# Patient Record
Sex: FEMALE | Race: WHITE | NOT HISPANIC OR LATINO | Employment: UNEMPLOYED | URBAN - METROPOLITAN AREA
[De-identification: names, ages, dates, MRNs, and addresses within clinical notes are randomized per-mention and may not be internally consistent; named-entity substitution may affect disease eponyms.]

---

## 2017-02-15 ENCOUNTER — NURSE TRIAGE (OUTPATIENT)
Dept: ADMINISTRATIVE | Facility: CLINIC | Age: 22
End: 2017-02-15

## 2017-02-16 ENCOUNTER — TELEPHONE (OUTPATIENT)
Dept: FAMILY MEDICINE | Facility: CLINIC | Age: 22
End: 2017-02-16

## 2017-02-16 NOTE — TELEPHONE ENCOUNTER
Pt went to dermatologist this morning and informed her that what she has is not pinkeye. Pt states she has a ring around eye that is itchy, dry skin that appeared a few days ago. Pt states she is trying desonide cream. Pt stated she does not want an appt right now, but will call us by Wednesday if it is still there.

## 2017-02-16 NOTE — TELEPHONE ENCOUNTER
Appt was canceled today. Attempted to call pt to see how her eye is doing today. LM to return call.

## 2017-02-16 NOTE — TELEPHONE ENCOUNTER
Received message from Evon Francis RN:  Reason for Disposition   Mild eye allergy (all triage questions negative)    Protocols used:  EYE - ALLERGY-A-AH  pt states she is not sure if she has pink eye or not/ c/o itching above and below 1 eye only/ skin very tender now/ denies pinkness to sclera/ + tearing/ denies visual changes/   Has appt w/ pcp tomorrow     Advised to keep appt--  Interim care p/protocol     Evon Francis RN

## 2017-03-06 RX ORDER — CITALOPRAM 10 MG/1
TABLET ORAL
Qty: 30 TABLET | Refills: 6 | Status: SHIPPED | OUTPATIENT
Start: 2017-03-06 | End: 2017-04-25 | Stop reason: ALTCHOICE

## 2017-04-25 ENCOUNTER — OFFICE VISIT (OUTPATIENT)
Dept: FAMILY MEDICINE | Facility: CLINIC | Age: 22
End: 2017-04-25
Payer: COMMERCIAL

## 2017-04-25 VITALS
WEIGHT: 186.31 LBS | SYSTOLIC BLOOD PRESSURE: 106 MMHG | HEIGHT: 63 IN | RESPIRATION RATE: 18 BRPM | DIASTOLIC BLOOD PRESSURE: 72 MMHG | HEART RATE: 100 BPM | TEMPERATURE: 99 F | BODY MASS INDEX: 33.01 KG/M2

## 2017-04-25 DIAGNOSIS — F41.9 ANXIETY: Primary | ICD-10-CM

## 2017-04-25 DIAGNOSIS — R63.5 WEIGHT GAIN: ICD-10-CM

## 2017-04-25 DIAGNOSIS — K21.9 GASTROESOPHAGEAL REFLUX DISEASE, ESOPHAGITIS PRESENCE NOT SPECIFIED: ICD-10-CM

## 2017-04-25 DIAGNOSIS — Z00.00 LABORATORY EXAM ORDERED AS PART OF ROUTINE GENERAL MEDICAL EXAMINATION: ICD-10-CM

## 2017-04-25 PROCEDURE — 99213 OFFICE O/P EST LOW 20 MIN: CPT | Mod: S$GLB,,, | Performed by: NURSE PRACTITIONER

## 2017-04-25 PROCEDURE — 1160F RVW MEDS BY RX/DR IN RCRD: CPT | Mod: S$GLB,,, | Performed by: NURSE PRACTITIONER

## 2017-04-25 RX ORDER — PANTOPRAZOLE SODIUM 40 MG/1
40 TABLET, DELAYED RELEASE ORAL DAILY
Refills: 3 | COMMUNITY
Start: 2017-03-06 | End: 2017-12-13

## 2017-04-25 NOTE — MR AVS SNAPSHOT
[facility]  [address] [address] [address]  [facility] [address]  Phone: [phone]  Fax: [phone]                  Maureen Barrera   2017 8:40 AM   Office Visit    Description:  Female : 1995   Provider:  Bree Stovall NP   Department:  [facility]           Diagnoses this Visit        Comments    Laboratory exam ordered as part of routine general medical examination    -  Primary     Weight gain                To Do List           Future Appointments        Provider Department Dept Phone    2017 8:15 AM LAB, [place] [facility] [phone]      Goals (5 Years of Data)     None      [facility]     [facility] Nurse Care Line -  Assistance  Unless otherwise directed by your provider, please contact [facility], our nurse care line that is available for  assistance.     Registered nurses in the [facility] Center provide: appointment scheduling, clinical advisement, health education, and other advisory services.  Call: [phone] (toll free)               Medications           Message regarding Medications     Verify the changes and/or additions to your medication regime listed below are the same as discussed with your clinician today.  If any of these changes or additions are incorrect, please notify your healthcare provider.        STOP taking these medications     esomeprazole (NEXIUM) 40 MG capsule Take 1 capsule (40 mg total) by mouth before breakfast.    montelukast (SINGULAIR) 10 mg tablet Take 1 tablet (10 mg total) by mouth every evening.    citalopram (CELEXA) 10 MG tablet TAKE 1 TABLET BY MOUTH DAILY           Verify that the below list of medications is an accurate representation of the medications you are currently taking.  If none reported, the list may be blank. If incorrect, please contact your healthcare provider. Carry this list with you in case of emergency.          "  Current Medications     mometasone (NASONEX) 50 mcg/actuation nasal spray 2 sprays by Nasal route once daily.    pantoprazole (PROTONIX) 40 MG tablet Take 40 mg by mouth once daily.    desonide (DESOWEN) 0.05 % cream Apply topically 2 (two) times daily.           Clinical Reference Information           Your Vitals Were     BP Pulse Temp Resp Height Weight    106/72 100 98.5 °F (36.9 °C) (Oral) 18 5' 3" (1.6 m) 84.5 kg (186 lb 4.6 oz)    Last Period BMI             03/28/2017 (Within Days) 33 kg/m2         Blood Pressure          Most Recent Value    BP  106/72      Allergies as of 4/25/2017     No Known Drug Allergies      Immunizations Administered on Date of Encounter - 4/25/2017     None      Orders Placed During Today's Visit     Future Labs/Procedures Expected by Expires    CBC auto differential  4/25/2017 4/26/2018    Comprehensive metabolic panel  4/25/2017 4/26/2018    Hemoglobin A1c  4/25/2017 4/26/2018    Lipid panel  4/25/2017 4/26/2018    TSH  4/25/2017 4/26/2018      Language Assistance Services     ATTENTION: Language assistance services are available, free of charge. Please call [phone].      ATENCIÓN: Si habla sharmin, tiene a ashley disposición servicios gratuitos de asistencia lingüística. Llame al [phone].     CHÚ Ý: N?u b?n nói Ti?ng Vi?t, có các d?ch v? h? tr? ngôn ng? mi?n phí dành cho b?n. G?i s? [phone].         [facility] complies with applicable Federal civil rights laws and does not discriminate on the basis of race, color, national origin, age, disability, or sex.        "

## 2017-04-25 NOTE — PROGRESS NOTES
Subjective:       Patient ID: Maureen Barrera is a 21 y.o. female.    Chief Complaint: No chief complaint on file.    HPI Here for medication follow up. She states she is not taking the Celexa for her anxiety anymore. She has found a new job that she is really comfortable in and feels that her stress level is very low. She is doing well in school and this is her last year so she can see the end date coming. She is back to exercising after work and she is eating healthier. States that she gained a lot of weight and is trying to get some off. She has a strong family history of DM and thyroid disease. She has not had labs done since 2014. She finds that the Protonix does not help control her acid reflux. She is trying to control it with diet. She is now working more normal hours and not working nights so is not skipping meals. She is backing down on caffeine and acid type foods. Will see if this helps. She has no other concerns today. See ROS.    The following portion of the patients history was reviewed and updated as appropriate: allergies, current medications, past medical and surgical history. Past social history and problem list reviewed. Family PMH and Past social history reviewed. Tobacco, Illicit drug use reviewed.     Review of Systems  Constitutional: No fatigue or fever    HENT: no sore throat or nasal congestion. No voice changes    Eyes: No vision changes, blurred vision  Skin: no rashes or lesions  Respiratory:   No SOB, Wheezing, cough  Cardiovascular:   No CP, Palpitations  Gastrointestinal:   No N/V/D. No abdominal pain, weight stable. Appetite good. See HPI  Genitourinary:   No dysuria, urgency or frequency. No change in bowels. No blood in stools.   Musculoskeletal:   No joint pain  No change in gait or coordination. .  Neurological:   No dizziness. No headaches  Hematological: No abnormal bruising or bleeding    Psychiatric/Behavioral Negative for suicidal ideas.  Denies feelings of depression.  "No thoughts of wanting to harm self or others. See HPI    Objective:     /72  Pulse 100  Temp 98.5 °F (36.9 °C) (Oral)   Resp 18  Ht 5' 3" (1.6 m)  Wt 84.5 kg (186 lb 4.6 oz)  LMP 03/28/2017 (Within Days)  BMI 33 kg/m2     Physical Exam     Constitutional: oriented to person, place, and time. well-developed and well-nourished.   Head: Normocephalic.   Eyes: Conjunctivae are normal. Pupils are equal, round, and reactive to light.   Neck: Normal range of motion. Neck supple. No tracheal deviation present. No thyromegaly present.   Cardiovascular: Normal rate, regular rhythm and normal heart sounds.    Pulmonary/Chest: Effort normal and breath sounds normal. No respiratory distress. No wheezes.   Abdominal: Soft. Bowel sounds are normal. No distension. There is no tenderness.   Musculoskeletal: Normal range of motion. Gait and coordination normal   Neurological: oriented to person, place, and time.   Skin: Skin is warm and dry. No rashes or lesions  Psychiatric: Normal mood and affect.Behavior is normal. Judgment and thought content normal.   Assessment:       1. Anxiety    2. Weight gain    3. Gastroesophageal reflux disease, esophagitis presence not specified    4. Laboratory exam ordered as part of routine general medical examination        Plan:         Diagnoses and all orders for this visit:    Anxiety: she is not taking the celexa at this time. Will see how she does without it.     Weight gain: check labs.  -     Hemoglobin A1c; Future  -     TSH; Future    Gastroesophageal reflux disease, esophagitis presence not specified: continue protonix and lifestyle change. If not helping will try the nexium again which worked before.    Laboratory exam ordered as part of routine general medical examination  -     Lipid panel; Future  -     CBC auto differential; Future  -     Comprehensive metabolic panel; Future  -     Hemoglobin A1c; Future  -     TSH; Future    Continue current medication  Take " medications only as prescribed  Healthy diet, exercise  Adequate rest  Adequate hydration  Avoid allergens  Avoid excessive caffeine

## 2017-04-29 ENCOUNTER — LAB VISIT (OUTPATIENT)
Dept: LAB | Facility: HOSPITAL | Age: 22
End: 2017-04-29
Attending: NURSE PRACTITIONER
Payer: COMMERCIAL

## 2017-04-29 DIAGNOSIS — Z00.00 LABORATORY EXAM ORDERED AS PART OF ROUTINE GENERAL MEDICAL EXAMINATION: ICD-10-CM

## 2017-04-29 DIAGNOSIS — R63.5 WEIGHT GAIN: ICD-10-CM

## 2017-04-29 LAB
ALBUMIN SERPL BCP-MCNC: 4 G/DL
ALP SERPL-CCNC: 70 U/L
ALT SERPL W/O P-5'-P-CCNC: 26 U/L
ANION GAP SERPL CALC-SCNC: 10 MMOL/L
AST SERPL-CCNC: 20 U/L
BASOPHILS # BLD AUTO: 0.04 K/UL
BASOPHILS NFR BLD: 0.5 %
BILIRUB SERPL-MCNC: 0.3 MG/DL
BUN SERPL-MCNC: 12 MG/DL
CALCIUM SERPL-MCNC: 9.6 MG/DL
CHLORIDE SERPL-SCNC: 106 MMOL/L
CHOLEST/HDLC SERPL: 3.6 {RATIO}
CO2 SERPL-SCNC: 22 MMOL/L
CREAT SERPL-MCNC: 0.8 MG/DL
DIFFERENTIAL METHOD: ABNORMAL
EOSINOPHIL # BLD AUTO: 0.2 K/UL
EOSINOPHIL NFR BLD: 2.4 %
ERYTHROCYTE [DISTWIDTH] IN BLOOD BY AUTOMATED COUNT: 15 %
EST. GFR  (AFRICAN AMERICAN): >60 ML/MIN/1.73 M^2
EST. GFR  (NON AFRICAN AMERICAN): >60 ML/MIN/1.73 M^2
GLUCOSE SERPL-MCNC: 89 MG/DL
HCT VFR BLD AUTO: 37.2 %
HDL/CHOLESTEROL RATIO: 27.9 %
HDLC SERPL-MCNC: 129 MG/DL
HDLC SERPL-MCNC: 36 MG/DL
HGB BLD-MCNC: 12 G/DL
LDLC SERPL CALC-MCNC: 77.4 MG/DL
LYMPHOCYTES # BLD AUTO: 2.6 K/UL
LYMPHOCYTES NFR BLD: 32 %
MCH RBC QN AUTO: 25 PG
MCHC RBC AUTO-ENTMCNC: 32.3 %
MCV RBC AUTO: 78 FL
MONOCYTES # BLD AUTO: 0.4 K/UL
MONOCYTES NFR BLD: 5.4 %
NEUTROPHILS # BLD AUTO: 4.7 K/UL
NEUTROPHILS NFR BLD: 59.4 %
NONHDLC SERPL-MCNC: 93 MG/DL
PLATELET # BLD AUTO: 340 K/UL
PMV BLD AUTO: 9.9 FL
POTASSIUM SERPL-SCNC: 4.1 MMOL/L
PROT SERPL-MCNC: 7.9 G/DL
RBC # BLD AUTO: 4.8 M/UL
SODIUM SERPL-SCNC: 138 MMOL/L
TRIGL SERPL-MCNC: 78 MG/DL
TSH SERPL DL<=0.005 MIU/L-ACNC: 1.7 UIU/ML
WBC # BLD AUTO: 7.96 K/UL

## 2017-04-29 PROCEDURE — 36415 COLL VENOUS BLD VENIPUNCTURE: CPT | Mod: PO

## 2017-04-29 PROCEDURE — 85025 COMPLETE CBC W/AUTO DIFF WBC: CPT

## 2017-04-29 PROCEDURE — 83036 HEMOGLOBIN GLYCOSYLATED A1C: CPT

## 2017-04-29 PROCEDURE — 84443 ASSAY THYROID STIM HORMONE: CPT

## 2017-04-29 PROCEDURE — 80061 LIPID PANEL: CPT

## 2017-04-29 PROCEDURE — 80053 COMPREHEN METABOLIC PANEL: CPT

## 2017-04-30 LAB
ESTIMATED AVG GLUCOSE: 111 MG/DL
HBA1C MFR BLD HPLC: 5.5 %

## 2017-05-01 ENCOUNTER — PATIENT MESSAGE (OUTPATIENT)
Dept: FAMILY MEDICINE | Facility: CLINIC | Age: 22
End: 2017-05-01

## 2017-05-16 ENCOUNTER — PATIENT MESSAGE (OUTPATIENT)
Dept: FAMILY MEDICINE | Facility: CLINIC | Age: 22
End: 2017-05-16

## 2017-05-17 RX ORDER — MOMETASONE FUROATE 50 UG/1
SPRAY, METERED NASAL
Qty: 17 G | Refills: 4 | Status: SHIPPED | OUTPATIENT
Start: 2017-05-17 | End: 2017-12-11 | Stop reason: ALTCHOICE

## 2017-05-17 RX ORDER — MONTELUKAST SODIUM 10 MG/1
10 TABLET ORAL NIGHTLY
Qty: 30 TABLET | Refills: 6 | Status: SHIPPED | OUTPATIENT
Start: 2017-05-17 | End: 2017-06-16

## 2017-06-14 ENCOUNTER — TELEPHONE (OUTPATIENT)
Dept: FAMILY MEDICINE | Facility: CLINIC | Age: 22
End: 2017-06-14

## 2017-06-14 NOTE — TELEPHONE ENCOUNTER
----- Message from Vivian Jeffery sent at 6/12/2017 11:33 AM CDT -----  Contact: Patient  Patient called requesting a script for ring worms. Send to [facility] in [place], [place] patient has additional questions regarding the ring worms, Please call back at [phone] to advise. Thanks,

## 2017-06-16 ENCOUNTER — TELEPHONE (OUTPATIENT)
Dept: FAMILY MEDICINE | Facility: CLINIC | Age: 22
End: 2017-06-16

## 2017-06-16 NOTE — TELEPHONE ENCOUNTER
Spoke with pt - states Monday AM she called to speak with a nurse regarding a ringworm that she had and didn't receive a callback until Thursday. Upon reviewing chart, pt called Monday at 1130 and call was returned next business day, Tuesday and again on Wednesday. I do not see where a call was placed on Thursday. Pt advised of this, and apologized for the inconvenience that this may have caused. Pt was understanding. Advised that if she has any type of issue that she feels is urgent, then she may ask the phone room to amrik message as this or can ask to speak with a nurse. Advised also that if she is ever unable to reach a nurse, she can ask to speak with me if it is an urgent issue. Pt verbalized understanding.

## 2017-06-16 NOTE — TELEPHONE ENCOUNTER
----- Message from Alivia Means sent at 6/15/2017  1:28 PM CDT -----  Pt called 06/12 about ringworm .. Took to long for a response / had to go to another Dr for treatment ... Feels this was not good patient care asking to speak to a lead nurse ... Call pt at [phone]

## 2017-06-16 NOTE — TELEPHONE ENCOUNTER
----- Message from Loan De Los Santos sent at 6/16/2017  1:21 PM CDT -----  Contact: self  Patient [phone] is returning call to Nurse from earlier today/please call

## 2017-11-30 ENCOUNTER — PATIENT MESSAGE (OUTPATIENT)
Dept: FAMILY MEDICINE | Facility: CLINIC | Age: 22
End: 2017-11-30

## 2017-12-01 ENCOUNTER — NURSE TRIAGE (OUTPATIENT)
Dept: ADMINISTRATIVE | Facility: CLINIC | Age: 22
End: 2017-12-01

## 2017-12-01 ENCOUNTER — TELEPHONE (OUTPATIENT)
Dept: FAMILY MEDICINE | Facility: CLINIC | Age: 22
End: 2017-12-01

## 2017-12-01 DIAGNOSIS — Z86.19 FREQUENT INFECTIONS: Primary | ICD-10-CM

## 2017-12-01 NOTE — TELEPHONE ENCOUNTER
Reason for Disposition   [1] Caller requesting NON-URGENT health information AND [2] PCP's office is the best resource    Protocols used: ST INFORMATION ONLY CALL-A-SONA Reddy called to say she has already seen derm, and is not satisfied. She has been using topical miconozale, but wants to discuss other alternatives, because it keeps recurring and she is concerned that another method or another specialty is needed.  Message to Bree Stovall NP , pcp. Please contact caller directly with any additional care advice.

## 2017-12-01 NOTE — TELEPHONE ENCOUNTER
Received message from On call nurse , message has been sent to Bree. Pt aware that message will be addressed on Monday by Bree. Pt advised that if necessary to be seen , pt can go to urgent care . Pt says offered an appt on Monday , pt refused. Pt will wait for email response from Bree and go from there.--lp

## 2017-12-04 NOTE — TELEPHONE ENCOUNTER
Pt is requesting referral immunology to see why she is so susceptible to ring worm. She has seen derm in the past and they only treat the ring worm, but does not help her understand whey she keeps getting it.

## 2017-12-04 NOTE — TELEPHONE ENCOUNTER
----- Message from Bonita Mcdermott sent at 12/4/2017  1:43 PM CST -----  Contact: self  Contact patient regarding an email from last week at [phone].    Thank you

## 2017-12-04 NOTE — TELEPHONE ENCOUNTER
I have no idea what skin issue she is talking about. I've not given her anything for it. Looks like Dr. Reid did.  Where is her rash?  Please get more details.

## 2017-12-05 NOTE — TELEPHONE ENCOUNTER
Spoke with pt.   I was unable to sunita pt with allergy.   Gave pt the [facility] phone # and she said she will call to schedule.

## 2017-12-05 NOTE — TELEPHONE ENCOUNTER
Pt's getting ringworm constantly. Pt is requesting referral to immunology to see why she keeps getting. Pt goes to derm, but they do not test why she keeps getting it, they only treat the ringworm. She feels that if she goes to immunology, they will tell hey WHY instead of just treating the problem.

## 2017-12-11 ENCOUNTER — OFFICE VISIT (OUTPATIENT)
Dept: OTOLARYNGOLOGY | Facility: CLINIC | Age: 22
End: 2017-12-11
Payer: COMMERCIAL

## 2017-12-11 VITALS
HEIGHT: 63 IN | DIASTOLIC BLOOD PRESSURE: 78 MMHG | SYSTOLIC BLOOD PRESSURE: 124 MMHG | WEIGHT: 190.5 LBS | BODY MASS INDEX: 33.75 KG/M2

## 2017-12-11 DIAGNOSIS — R09.81 NASAL CONGESTION: ICD-10-CM

## 2017-12-11 DIAGNOSIS — J34.3 HYPERTROPHY, NASAL, TURBINATE: ICD-10-CM

## 2017-12-11 DIAGNOSIS — J30.89 CHRONIC NON-SEASONAL ALLERGIC RHINITIS, UNSPECIFIED TRIGGER: Primary | ICD-10-CM

## 2017-12-11 DIAGNOSIS — J33.0 POLYP OF NASAL CAVITY: ICD-10-CM

## 2017-12-11 PROCEDURE — 31231 NASAL ENDOSCOPY DX: CPT | Mod: S$GLB,,, | Performed by: OTOLARYNGOLOGY

## 2017-12-11 PROCEDURE — 99214 OFFICE O/P EST MOD 30 MIN: CPT | Mod: 25,S$GLB,, | Performed by: OTOLARYNGOLOGY

## 2017-12-11 PROCEDURE — 99999 PR PBB SHADOW E&M-EST. PATIENT-LVL III: CPT | Mod: PBBFAC,,, | Performed by: OTOLARYNGOLOGY

## 2017-12-11 RX ORDER — FLUTICASONE PROPIONATE 50 MCG
2 SPRAY, SUSPENSION (ML) NASAL DAILY
Qty: 1 BOTTLE | Refills: 12 | Status: SHIPPED | OUTPATIENT
Start: 2017-12-11 | End: 2017-12-18

## 2017-12-11 RX ORDER — DOXYLAMINE SUCCINATE 25 MG
TABLET ORAL 2 TIMES DAILY
COMMUNITY
End: 2018-04-18

## 2017-12-11 NOTE — PROGRESS NOTES
Subjective:       Patient ID: Maureen Barrera is a 22 y.o. female.    Chief Complaint: Nasal Polyps    Maureen is here for follow-up of CRS. History of right sphenoid sinus polyp and bilateral spehnoid sinusotomy 2015. Had complete improvement in symptoms. Has been using Nasonex and Singulair but recently has not been covered and she has been off of it. Even though she has been taking it for a long time, she did not feel that it was making in improvement in her symptoms. She has noticed worsening in symptoms over the past 6 months. Constant congestion. Intermittent pressure in the maxillary and frontal region. No malodor. Sense of smell is Ok. Intermittent PND.     She does have daily headaches.     SNOT-22 = 11  She works at a [employer] in [place].   She has had allergy testing in the past but is scheduled to see an allergist Wednesday for recurring ringworm. She denies any AR obvious triggers.     Review of Systems   Constitutional: Negative for activity change and appetite change.   Respiratory: Negative for difficulty breathing and wheezing   Cardiovascular: Negative for chest pain.      Objective:        Constitutional:   She is oriented to person, place, and time. She appears well-developed and well-nourished. She appears alert. She is active. Normal speech.      Head:  Normocephalic and atraumatic. Head is without TMJ tenderness. No scars. Salivary glands normal.  Facial strength is normal.      Ears:    Right Ear: No drainage or swelling. No middle ear effusion.   Left Ear: No drainage or swelling.  No middle ear effusion.     Nose:  Mucosal edema present. No rhinorrhea (mild crusting left anterior nasal cavity) or sinus tenderness. No turbinate hypertrophy.      Mouth/Throat  Oropharynx clear and moist without lesions or asymmetry, normal uvula midline and mirror exam normal. Normal dentition. No uvula swelling, lacerations or trismus. No oropharyngeal exudate. Tonsillar erythema, tonsillar exudate.       Neck:  Full range of motion with neck supple and no adenopathy. Thyroid tenderness is present. No tracheal deviation, no edema, no erythema, normal range of motion, no stridor, no crepitus and no neck rigidity present. No thyroid mass present.     Cardiovascular:   Intact distal pulses and normal pulses.      Pulmonary/Chest:   Effort normal and breath sounds normal. No stridor.     Psychiatric:   Her speech is normal and behavior is normal. Her mood appears not anxious. Her affect is not labile.     Neurological:   She is alert and oriented to person, place, and time. No sensory deficit.     Skin:   No abrasions, lacerations, lesions, or rashes. No abrasion and no bruising noted.         Tests / Results:  Name: Maureen Barrera     Pre-procedure diagnosis: Chronic non-seasonal allergic rhinitis, unspecified trigger [J30.89]   Post-procedure diagnosis: Same    Procedure: Bilateral nasal endoscopy  Anesthesia:  2% Lidocaine and 4% Oxymetazoline topical    Procedure: Risks, benefits, and alternatives of the procedure were discussed with the patient, and consent was obtained to perform a nasal endoscopy.  The nasal cavity was sprayed with a topical decongestant and topical anesthetic. After adequate anesthesia was obtained, the scope was passed into each nostril independently.  The nasal cavities, nasopharynx, choana, eustachian tube, fossa of Rosenmüller, and adenoids were examined with the findings described below. At the end of the examination, the scope was removed. The patient tolerated the procedure well with no complications.     Findings:  -     Nasal septum: RIGHT moderate deviation superior near Mt, not obstructing nasal airway inferiorly  -     Inferior turbinate: hypertrophy or edema (Moderate) bilaterally  -     Middle meatus: LEFT: no purulence, no obstruction, no polyps  RIGHT: no purulence, no obstruction, no polyps  -     Sphenoethmoidal recess LEFT: no purulence, no obstruction, no polyps RIGHT  no purulence, no obstruction, no polyps  -     Adenoids have no hypertrophy  -     There is no stenosis of the choana,  eustachian tube, or nasopharynx  -     Fossa of Rosenmüller is symmetric and contains no mass  -     No other findings      Assessment:       1. Chronic non-seasonal allergic rhinitis, unspecified trigger    2. Nasal congestion    3. Hypertrophy, nasal, turbinate    4. Polyp of nasal cavity          Plan:         Nasal endoscopy negative for recurring polyps. Sphenoids patent  Recommend oral AH and restarting nasal steroid  If persistent symptoms, CT scan to rule our concomitant sinusitis  Consider turbinate reduction.

## 2017-12-13 ENCOUNTER — OFFICE VISIT (OUTPATIENT)
Dept: ALLERGY | Facility: CLINIC | Age: 22
End: 2017-12-13
Payer: COMMERCIAL

## 2017-12-13 VITALS — BODY MASS INDEX: 33.48 KG/M2 | WEIGHT: 188.94 LBS | HEIGHT: 63 IN | OXYGEN SATURATION: 99 % | HEART RATE: 102 BPM

## 2017-12-13 DIAGNOSIS — J31.0 OTHER CHRONIC RHINITIS: ICD-10-CM

## 2017-12-13 DIAGNOSIS — B35.4 RINGWORM OF BODY: Primary | ICD-10-CM

## 2017-12-13 DIAGNOSIS — R10.13 EPIGASTRIC PAIN: ICD-10-CM

## 2017-12-13 DIAGNOSIS — E73.9 LACTOSE INTOLERANCE IN ADULT: ICD-10-CM

## 2017-12-13 PROCEDURE — 99999 PR PBB SHADOW E&M-EST. PATIENT-LVL III: CPT | Mod: PBBFAC,,, | Performed by: ALLERGY & IMMUNOLOGY

## 2017-12-13 PROCEDURE — 99244 OFF/OP CNSLTJ NEW/EST MOD 40: CPT | Mod: S$GLB,,, | Performed by: ALLERGY & IMMUNOLOGY

## 2017-12-13 RX ORDER — CETIRIZINE HYDROCHLORIDE 10 MG/1
10 TABLET ORAL DAILY
COMMUNITY
End: 2018-05-22

## 2017-12-13 NOTE — PROGRESS NOTES
Subjective:       Patient ID: Maureen Barrera is a 22 y.o. female.    Chief Complaint:  Allergies (pt wants to know she keeps getting ringworm,also suspects gluten allergy)      23 yo woman presents for consult from PAUL Stovall for recurrent fungal infections. She has been seen in past for chronic rhinitis, last was 2012. Had skin test with very slight to one tree. She had polypectomy after that and did well. Recently had some congestion so saw ENT and advised no polyps just some chronic rhinitis so prescribed Flonase and zyrtec daily bu she has not started until after this visit. She presents now because she has had recurrent ringworm. She is a  and did have exposure but not direct. No one else has had it. The first was on abdomen and so bad needed oral fluconazole. It did clear, she bleached everything at her home. Her dogs did not get it. Since then had several more. She treats right away with topical miconazole and does clear but takes weeks and some have left scars. She has no other infections, no thrush, no vaginal yeast infections, no sinus infections, no bronchitis or pneumonia, no staph skin infections. Otherwise very healthy.   She does repots latex allergy - redness and blistering from contact. She also has lactose intolerance - gets very nauseated, occ has to take Zofran, mostly avoids. She thinks she may have issue with gluten, gets bad abdominal pain and rumbling, has daily HA and has fine bump rash on arms. She plans to try gluten free but has not done yet.   She has no asthma or eczema. No other medical issues. Did have sinus surgery 2012.         Environmental History: see history section for home environment  Review of Systems   Constitutional: Negative for appetite change, chills, fatigue and fever.   HENT: Positive for congestion. Negative for ear discharge, ear pain, facial swelling, nosebleeds, postnasal drip, rhinorrhea, sinus pressure, sneezing, sore throat, trouble  swallowing and voice change.    Eyes: Negative for discharge, redness, itching and visual disturbance.   Respiratory: Negative for cough, choking, chest tightness, shortness of breath and wheezing.    Cardiovascular: Negative for chest pain, palpitations and leg swelling.   Gastrointestinal: Positive for abdominal distention, abdominal pain and nausea. Negative for constipation, diarrhea and vomiting.   Genitourinary: Negative for difficulty urinating.   Musculoskeletal: Negative for arthralgias, gait problem, joint swelling and myalgias.   Skin: Positive for rash. Negative for color change.   Neurological: Negative for dizziness, syncope, weakness, light-headedness and headaches.   Hematological: Negative for adenopathy. Does not bruise/bleed easily.   Psychiatric/Behavioral: Negative for agitation, behavioral problems, confusion and sleep disturbance. The patient is not nervous/anxious.         Objective:    Physical Exam   Constitutional: She is oriented to person, place, and time. She appears well-developed and well-nourished. No distress.   HENT:   Head: Normocephalic and atraumatic.   Right Ear: Hearing, tympanic membrane, external ear and ear canal normal.   Left Ear: Hearing, tympanic membrane, external ear and ear canal normal.   Nose: No mucosal edema, rhinorrhea, sinus tenderness or septal deviation. No epistaxis. Right sinus exhibits no maxillary sinus tenderness and no frontal sinus tenderness. Left sinus exhibits no maxillary sinus tenderness and no frontal sinus tenderness.   Mouth/Throat: Uvula is midline, oropharynx is clear and moist and mucous membranes are normal. No uvula swelling.   Eyes: Conjunctivae are normal. Right eye exhibits no discharge. Left eye exhibits no discharge.   Neck: Normal range of motion. No thyromegaly present.   Cardiovascular: Normal rate, regular rhythm and normal heart sounds.    No murmur heard.  Pulmonary/Chest: Effort normal and breath sounds normal. No respiratory  distress. She has no wheezes.   Abdominal: Soft. She exhibits no distension. There is no tenderness.   Musculoskeletal: Normal range of motion. She exhibits no edema or tenderness.   Lymphadenopathy:     She has no cervical adenopathy.   Neurological: She is alert and oriented to person, place, and time.   Skin: Skin is warm and dry. No rash noted. No erythema.   Psychiatric: She has a normal mood and affect. Her behavior is normal. Judgment and thought content normal.   Nursing note and vitals reviewed.      Laboratory:   none performed   Assessment:       1. Ringworm of body    2. Epigastric pain    3. Lactose intolerance in adult    4. Other chronic rhinitis         Plan:       1. Discussed recurrent fungal infections,advised given otherwise healthy likely not immune system dysfunction but need to send labs to evaluate and be sure, if normal may need IF evaluation  2. Discussed food intolerance, has lactose intolerance and may have some gluten intolerance will send lab for celiac, if negative may need GI eval  3. For rhinitis ok to start fluticasone 2 SEN daily and zyrtec as needed  4. Phone review  5. PAUL Stovall notified of completed consult via [facility]

## 2017-12-13 NOTE — LETTER
December 13, 2017      Bree Stovall, PAUL  [address]  [facility] [address]  [facility] [address]           [facility]  [address] [facility] [address] [place] [address]  Phone: [phone]          Patient: Maureen Barrera   MR Number: [id number]   YOB: 1995   Date of Visit: 12/13/2017       Dear Bree Stovall:    Thank you for referring Maureen Barrera to me for evaluation. Attached you will find relevant portions of my assessment and plan of care.    If you have questions, please do not hesitate to call me. I look forward to following Maureen Barrera along with you.    Sincerely,    Oneida Piper MD    Enclosure  CC:  No Recipients    If you would like to receive this communication electronically, please contact [email or web address] or [phone] to request more information on [employer] Link access.    For providers and/or their staff who would like to refer a patient to [facility], please contact us through our one-stop-shop provider referral line, [facility], at [phone].    If you feel you have received this communication in error or would no longer like to receive these types of communications, please e-mail [email or web address]

## 2017-12-14 ENCOUNTER — LAB VISIT (OUTPATIENT)
Dept: LAB | Facility: HOSPITAL | Age: 22
End: 2017-12-14
Attending: ALLERGY & IMMUNOLOGY
Payer: COMMERCIAL

## 2017-12-14 DIAGNOSIS — R10.13 EPIGASTRIC PAIN: ICD-10-CM

## 2017-12-14 DIAGNOSIS — B35.4 RINGWORM OF BODY: ICD-10-CM

## 2017-12-14 LAB
BASOPHILS # BLD AUTO: 0.05 K/UL
BASOPHILS NFR BLD: 0.8 %
DIFFERENTIAL METHOD: ABNORMAL
EOSINOPHIL # BLD AUTO: 0.1 K/UL
EOSINOPHIL NFR BLD: 2 %
ERYTHROCYTE [DISTWIDTH] IN BLOOD BY AUTOMATED COUNT: 14.4 %
HCT VFR BLD AUTO: 39.5 %
HGB BLD-MCNC: 12.2 G/DL
IGA SERPL-MCNC: 271 MG/DL
IGE SERPL-ACNC: <35 IU/ML
IGG SERPL-MCNC: 1316 MG/DL
IGM SERPL-MCNC: 203 MG/DL
IMM GRANULOCYTES # BLD AUTO: 0.01 K/UL
IMM GRANULOCYTES NFR BLD AUTO: 0.2 %
LYMPHOCYTES # BLD AUTO: 2.5 K/UL
LYMPHOCYTES NFR BLD: 37.2 %
MCH RBC QN AUTO: 24.4 PG
MCHC RBC AUTO-ENTMCNC: 30.9 G/DL
MCV RBC AUTO: 79 FL
MONOCYTES # BLD AUTO: 0.4 K/UL
MONOCYTES NFR BLD: 5.7 %
NEUTROPHILS # BLD AUTO: 3.6 K/UL
NEUTROPHILS NFR BLD: 54.1 %
NRBC BLD-RTO: 0 /100 WBC
PLATELET # BLD AUTO: 398 K/UL
PMV BLD AUTO: 10 FL
RBC # BLD AUTO: 4.99 M/UL
WBC # BLD AUTO: 6.66 K/UL

## 2017-12-14 PROCEDURE — 86359 T CELLS TOTAL COUNT: CPT

## 2017-12-14 PROCEDURE — 82785 ASSAY OF IGE: CPT

## 2017-12-14 PROCEDURE — 82784 ASSAY IGA/IGD/IGG/IGM EACH: CPT | Mod: 59

## 2017-12-14 PROCEDURE — 82784 ASSAY IGA/IGD/IGG/IGM EACH: CPT

## 2017-12-14 PROCEDURE — 86003 ALLG SPEC IGE CRUDE XTRC EA: CPT | Mod: 59

## 2017-12-14 PROCEDURE — 86360 T CELL ABSOLUTE COUNT/RATIO: CPT

## 2017-12-14 PROCEDURE — 82787 IGG 1 2 3 OR 4 EACH: CPT | Mod: 59

## 2017-12-14 PROCEDURE — 86648 DIPHTHERIA ANTIBODY: CPT

## 2017-12-14 PROCEDURE — 86357 NK CELLS TOTAL COUNT: CPT

## 2017-12-14 PROCEDURE — 85025 COMPLETE CBC W/AUTO DIFF WBC: CPT

## 2017-12-14 PROCEDURE — 86355 B CELLS TOTAL COUNT: CPT

## 2017-12-14 PROCEDURE — 83516 IMMUNOASSAY NONANTIBODY: CPT

## 2017-12-14 PROCEDURE — 86003 ALLG SPEC IGE CRUDE XTRC EA: CPT

## 2017-12-15 ENCOUNTER — TELEPHONE (OUTPATIENT)
Dept: ALLERGY | Facility: CLINIC | Age: 22
End: 2017-12-15

## 2017-12-15 LAB
CD3+CD4+ CELLS # BLD: 1386 CELLS/UL (ref 300–1400)
CD3+CD4+ CELLS NFR BLD: 55.8 % (ref 28–57)
LYMPHOCYTES NFR CSF MANUAL: 16.9 % (ref 10–39)
LYMPHOCYTES NFR CSF MANUAL: 17.4 % (ref 6–19)
LYMPHOCYTES NFR CSF MANUAL: 1853 CELLS/UL (ref 700–2100)
LYMPHOCYTES NFR CSF MANUAL: 199 CELLS/UL (ref 90–600)
LYMPHOCYTES NFR CSF MANUAL: 3.31 % (ref 0.9–3.6)
LYMPHOCYTES NFR CSF MANUAL: 419 CELLS/UL (ref 200–900)
LYMPHOCYTES NFR CSF MANUAL: 466 CELLS/UL (ref 100–500)
LYMPHOCYTES NFR CSF MANUAL: 7.4 % (ref 7–31)
LYMPHOCYTES NFR CSF MANUAL: 74.6 % (ref 55–83)

## 2017-12-16 LAB
IGG1 SER-MCNC: 611 MG/DL
IGG2 SER-MCNC: 487 MG/DL
IGG3 SER-MCNC: 64 MG/DL
IGG4 SER-MCNC: 108 MG/DL

## 2017-12-18 ENCOUNTER — OFFICE VISIT (OUTPATIENT)
Dept: INFECTIOUS DISEASES | Facility: CLINIC | Age: 22
End: 2017-12-18
Payer: COMMERCIAL

## 2017-12-18 VITALS
HEART RATE: 82 BPM | DIASTOLIC BLOOD PRESSURE: 86 MMHG | SYSTOLIC BLOOD PRESSURE: 140 MMHG | HEIGHT: 63 IN | WEIGHT: 190.94 LBS | TEMPERATURE: 97 F | BODY MASS INDEX: 33.83 KG/M2

## 2017-12-18 DIAGNOSIS — B35.4 TINEA CORPORIS: ICD-10-CM

## 2017-12-18 DIAGNOSIS — L25.9 CONTACT DERMATITIS, UNSPECIFIED CONTACT DERMATITIS TYPE, UNSPECIFIED TRIGGER: ICD-10-CM

## 2017-12-18 LAB
ALLERGEN WHEAT IGE: <0.35 KU/L
DEPRECATED GLUTEN IGE RAST QL: NORMAL
GLUTEN IGE QN: <0.35 KU/L
TTG IGA SER IA-ACNC: 7 UNITS
WHEAT CLASS: NORMAL

## 2017-12-18 PROCEDURE — 99999 PR PBB SHADOW E&M-EST. PATIENT-LVL III: CPT | Mod: PBBFAC,,, | Performed by: INTERNAL MEDICINE

## 2017-12-18 PROCEDURE — 99214 OFFICE O/P EST MOD 30 MIN: CPT | Mod: S$GLB,,, | Performed by: INTERNAL MEDICINE

## 2017-12-18 RX ORDER — TRIAMCINOLONE ACETONIDE 55 UG/1
2 SPRAY, METERED NASAL DAILY
COMMUNITY
End: 2018-05-22

## 2017-12-18 NOTE — PROGRESS NOTES
Infectious Diseases Clinic Note    Subjective:       Patient ID: Maureen Barrera is a 22 y.o. female.    Chief Complaint: Follow-up    HPI     Has gotten multiple ringworm infections (diagnosed by scraping and testing) on R arm  First time was in [place] when happened then most recently developed ringworm again.  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, occassional bird\  Has 2 dogs at home,   No travel outside of [place] in 4/2017, to [facility]  No tob, social ETOH minimal, no illicits  No current sexual activity, no past STDs    fam hx  Dad - has rashes when stressed  Sister - may have AI disease  Mom - cancer    Past Medical History:   Diagnosis Date    Anxiety     Eczema     Fracture of left foot 2010    GERD (gastroesophageal reflux disease)     Headache(784.0)     Lactose intolerance     Polyp of nasal cavity     Recurrent upper respiratory infection (URI)     Sprain of right ankle 10/26/11    Varicella     2nd grade       Social History     Social History    Marital status: Single     Spouse name: N/A    Number of children: N/A    Years of education: N/A     Occupational History          Social History Main Topics    Smoking status: Never Smoker    Smokeless tobacco: Never Used    Alcohol use No    Drug use: No    Sexual activity: Not on file     Other Topics Concern    Not on file     Social History Narrative    Born in [place].  Moved from [place] in 2011.         Current Outpatient Prescriptions:     cetirizine (ZYRTEC) 10 MG tablet, Take 10 mg by mouth once daily., Disp: , Rfl:     triamcinolone (NASACORT) 55 mcg nasal inhaler, 2 sprays by Nasal route once daily., Disp: , Rfl:     desonide (DESOWEN) 0.05 % cream,  Apply topically 2 (two) times daily., Disp: 60 g, Rfl: 2    miconazole (MICOTIN) 2 % cream, Apply topically 2 (two) times daily., Disp: , Rfl:     Review of Systems   Constitutional: Negative for activity change, chills and fever.   HENT: Negative for congestion, mouth sores, rhinorrhea, sinus pressure and sore throat.    Eyes: Negative for photophobia, pain and redness.   Respiratory: Negative for cough, chest tightness, shortness of breath and wheezing.    Cardiovascular: Negative for chest pain and leg swelling.   Gastrointestinal: Negative for abdominal distention, abdominal pain, diarrhea, nausea and vomiting.   Endocrine: Negative for polyuria.   Genitourinary: Negative for decreased urine volume, dysuria and flank pain.   Musculoskeletal: Negative for joint swelling and neck pain.   Skin: Negative for color change.   Allergic/Immunologic: Negative for food allergies.   Neurological: Negative for dizziness, weakness and headaches.   Hematological: Negative for adenopathy.   Psychiatric/Behavioral: Negative for agitation and confusion. The patient is not nervous/anxious.            Objective:      Vitals:    12/18/17 1521   BP: (!) 140/86   Pulse: 82   Temp: 97.3 °F (36.3 °C)     Physical Exam   Constitutional: She is oriented to person, place, and time. She appears well-developed and well-nourished.   HENT:   Head: Normocephalic and atraumatic.   Eyes: Pupils are equal, round, and reactive to light.   Neck: Normal range of motion. Neck supple.   Cardiovascular: Normal rate.    Pulmonary/Chest: Effort normal and breath sounds normal.   Abdominal: Soft. Bowel sounds are normal.   Musculoskeletal: She exhibits no edema or tenderness.   Neurological: She is alert and oriented to person, place, and time.   Skin: Skin is warm and dry.   Right wrist where watch lies with scaly erythematous lesions   Psychiatric: She has a normal mood and affect.           Assessment/Plan:       No diagnosis found.      21 y/o F here  for evaluation of recurrent ring worm infections   - unclear cause of recurrence, patients job does put her at risk, no clear infectious process at this time  - suspect right wrist lesion may be contact dermatitis with watch  - recommend f/u with dermatology here at main campus if not resolved with removal of watch  - would consider rheum work up given history

## 2017-12-20 PROBLEM — L25.9 CONTACT DERMATITIS: Status: ACTIVE | Noted: 2017-12-20

## 2017-12-20 PROBLEM — B35.4 TINEA CORPORIS: Status: ACTIVE | Noted: 2017-12-20

## 2017-12-26 LAB
C DIPHTHERIAE AB SER IA-ACNC: 0.62 IU/ML
C TETANI AB SER-ACNC: 2.71 IU/ML
DEPRECATED S PNEUM 1 IGG SER-MCNC: <0.3 MCG/ML
DEPRECATED S PNEUM12 IGG SER-MCNC: <0.3 MCG/ML
DEPRECATED S PNEUM14 IGG SER-MCNC: <0.3 MCG/ML
DEPRECATED S PNEUM19 IGG SER-MCNC: 0.5 MCG/ML
DEPRECATED S PNEUM23 IGG SER-MCNC: <0.3 MCG/ML
DEPRECATED S PNEUM3 IGG SER-MCNC: 1.3 MCG/ML
DEPRECATED S PNEUM4 IGG SER-MCNC: <0.3 MCG/ML
DEPRECATED S PNEUM5 IGG SER-MCNC: 3.5 MCG/ML
DEPRECATED S PNEUM8 IGG SER-MCNC: <0.3 MCG/ML
DEPRECATED S PNEUM9 IGG SER-MCNC: <0.3 MCG/ML
HAEM INFLU B IGG SER-MCNC: 0.7 MG/L
S PNEUM DA 18C IGG SER-MCNC: <0.3 MCG/ML
S PNEUM DA 6B IGG SER-MCNC: 18.1 MCG/ML
S PNEUM DA 7F IGG SER-MCNC: 0.3 MCG/ML
S PNEUM DA 9V IGG SER-MCNC: <0.3 MCG/ML

## 2017-12-27 ENCOUNTER — PATIENT MESSAGE (OUTPATIENT)
Dept: ALLERGY | Facility: CLINIC | Age: 22
End: 2017-12-27

## 2018-01-05 ENCOUNTER — TELEPHONE (OUTPATIENT)
Dept: FAMILY MEDICINE | Facility: CLINIC | Age: 23
End: 2018-01-05

## 2018-01-05 NOTE — TELEPHONE ENCOUNTER
----- Message from Kristel Nassar sent at 1/5/2018 10:36 AM CST -----  Contact: self  Patint called regarding scheduling an appt today for cough and flu like symptoms. Please contact [phone] (sdfl)

## 2018-01-05 NOTE — TELEPHONE ENCOUNTER
Tried to reach pt. No answer, left msg to call back.    Contacting to inform pt that there are no open appts and that she should go to urgent care.

## 2018-01-05 NOTE — TELEPHONE ENCOUNTER
Spoke w/ pt. Informed pt about no openings w/ provider requested. Advised pt to go to an urgent care to get treatment started prior to the weekend pt declined and stated she will wait to be seen by provider. Apt made for 1- per pt request.

## 2018-01-09 ENCOUNTER — TELEPHONE (OUTPATIENT)
Dept: ALLERGY | Facility: CLINIC | Age: 23
End: 2018-01-09

## 2018-01-09 NOTE — TELEPHONE ENCOUNTER
Spoke to pt, she is having an issue with billing and insurance not covering her labs. Billing tells her its insurance issue, insurance tells her it's [facility] issue. Told her I would contact our manager for assistance.

## 2018-01-09 NOTE — TELEPHONE ENCOUNTER
----- Message from Shannan King LPN sent at 1/9/2018  1:26 PM CST -----  Contact: Maureen Loya advise.    ----- Message -----  From: Moi Rey  Sent: 1/9/2018  12:50 PM  To: [facility] Staff    She wants to speak regarding 12/14 labs. Please call [phone] (home)   Thanks!

## 2018-01-09 NOTE — TELEPHONE ENCOUNTER
Sent her an e-mail 12/27 with the following:  Your immune system tests are all normal except not as much protection against strep pneumoniae as we like to see. This would not be cause of ringworm so nothing in you immune system is causing recurrent ringworm. We can increase this protected with pneumonia vaccine if you are interested, does help decrease sinus infections. You also had negative celiac test and negative allergy tests to wheat and gluten.   Let us know if you have any questions   Dr Piper      Please call her and see what questions she has

## 2018-01-22 ENCOUNTER — OFFICE VISIT (OUTPATIENT)
Dept: OTOLARYNGOLOGY | Facility: CLINIC | Age: 23
End: 2018-01-22
Payer: COMMERCIAL

## 2018-01-22 VITALS — HEIGHT: 63 IN | TEMPERATURE: 99 F | BODY MASS INDEX: 33.43 KG/M2 | WEIGHT: 188.69 LBS

## 2018-01-22 DIAGNOSIS — J32.4 CHRONIC PANSINUSITIS: ICD-10-CM

## 2018-01-22 DIAGNOSIS — J30.2 CHRONIC SEASONAL ALLERGIC RHINITIS, UNSPECIFIED TRIGGER: ICD-10-CM

## 2018-01-22 DIAGNOSIS — J34.3 HYPERTROPHY, NASAL, TURBINATE: Primary | ICD-10-CM

## 2018-01-22 DIAGNOSIS — R09.81 NASAL CONGESTION: ICD-10-CM

## 2018-01-22 PROCEDURE — 99999 PR PBB SHADOW E&M-EST. PATIENT-LVL III: CPT | Mod: PBBFAC,,, | Performed by: OTOLARYNGOLOGY

## 2018-01-22 PROCEDURE — 99214 OFFICE O/P EST MOD 30 MIN: CPT | Mod: S$GLB,,, | Performed by: OTOLARYNGOLOGY

## 2018-01-22 NOTE — PROGRESS NOTES
Subjective:       Patient ID: Maureen Barrera is a 22 y.o. female.    Chief Complaint: Sinusitis (6 week kye)    Maureen is here for follow-up of nasal congestion, rhinitis, and history of nasal polyps. She restarted INS and oral AH and has been compliant. She has not noticed an improvement in symptoms.     She has congestion  She is having pressure in her head - frontal region extending laterally. Tylenol reliably helps.   No PND.    She had allergy testing recently which was negative. Her S. pneumo titer was low.     Review of Systems   Constitutional: Negative for activity change and appetite change.   Respiratory: Negative for difficulty breathing and wheezing   Cardiovascular: Negative for chest pain.      Objective:        Constitutional:   Vital signs are normal. She appears well-developed and well-nourished.     Head:  Normocephalic and atraumatic.     Ears:  Hearing normal to normal and whispered voice; external ear normal without scars, lesions, or masses; ear canal, tympanic membrane, and middle ear normal..     Nose:  Mucosal edema present. No rhinorrhea. Turbinate hypertrophy.  Abnormal turbinates: mod L>R.          Tests / Results:  None    Assessment:       1. Hypertrophy, nasal, turbinate    2. Nasal congestion    3. Chronic seasonal allergic rhinitis, unspecified trigger    4. Chronic pansinusitis          Plan:       CT Sinus given her history of nasal polyps and recurrent symptoms. Discussed that headache may not be related to sinus issues, but she feels that the headache was very responsive to surgery lasts time, so could be related  Continue INS and Oral AH   Discussed ITR, but will see what CT shows as to whether anything else warranted.

## 2018-01-30 ENCOUNTER — TELEPHONE (OUTPATIENT)
Dept: FAMILY MEDICINE | Facility: CLINIC | Age: 23
End: 2018-01-30

## 2018-01-30 NOTE — TELEPHONE ENCOUNTER
----- Message from Kiki Gomez sent at 1/29/2018  5:39 PM CST -----  Contact: Pt  Pt is requesting a callback says a medication was supposed to be called in for her UTI    Pt can be reached at [phone]    Thanks

## 2018-01-30 NOTE — TELEPHONE ENCOUNTER
----- Message from Bonita Mcdermott sent at 1/29/2018  9:39 AM CST -----  Patient is requesting a prescription for a UTI called into [facility] pharmacy below, she is requesting a call  prior to calling in script at [phone].          [facility] [address] [place] [address] [address] [place] [address]  [address] [place] [id number]  Phone: [phone] Fax: [phone]

## 2018-01-30 NOTE — TELEPHONE ENCOUNTER
Pt c/o stated she is urinating frequently.   Took Azo yesterday.  And stated she is back to normal.  Informed pt to drink plenty of water, less caffeine and to call back if her symptoms come back or worsen.

## 2018-01-30 NOTE — TELEPHONE ENCOUNTER
Spoke to pt this morning and she stated she is having only frequency, no other UTI symptoms.   Started with Azo yesterday and said it helped.   Waiting for pt to call back to see if symptoms came back today.

## 2018-02-01 ENCOUNTER — TELEPHONE (OUTPATIENT)
Dept: ALLERGY | Facility: CLINIC | Age: 23
End: 2018-02-01

## 2018-02-16 ENCOUNTER — TELEPHONE (OUTPATIENT)
Dept: OTOLARYNGOLOGY | Facility: CLINIC | Age: 23
End: 2018-02-16

## 2018-02-16 NOTE — TELEPHONE ENCOUNTER
----- Message from Rosalio De Oliveira MD sent at 2/16/2018  3:53 PM CST -----  Can you look into why she hasn't had imaging yet? I had set a reminder for myself to make sure she got her CT scan, but it hasn't been scheduled yet. I'm not sure why. I think the patient is waiting on scheduling as well.     ----- Message -----  From: Rosalio De Oliveira MD  Sent: 1/22/2018   8:02 AM  To: Rosalio De Oliveira MD    Check on whether she has had imaging

## 2018-04-04 ENCOUNTER — OFFICE VISIT (OUTPATIENT)
Dept: PODIATRY | Facility: CLINIC | Age: 23
End: 2018-04-04
Payer: COMMERCIAL

## 2018-04-04 VITALS — HEIGHT: 63 IN | BODY MASS INDEX: 33.31 KG/M2 | WEIGHT: 188 LBS

## 2018-04-04 DIAGNOSIS — S86.312A TEAR OF PERONEAL TENDON, LEFT, INITIAL ENCOUNTER: ICD-10-CM

## 2018-04-04 DIAGNOSIS — M25.372 LEFT ANKLE INSTABILITY: ICD-10-CM

## 2018-04-04 DIAGNOSIS — T14.8XXA LIGAMENT RUPTURE: ICD-10-CM

## 2018-04-04 DIAGNOSIS — Q66.30 VARUS DEFORMITIES OF FEET, CONGENITAL: Primary | ICD-10-CM

## 2018-04-04 PROCEDURE — 99214 OFFICE O/P EST MOD 30 MIN: CPT | Mod: S$GLB,,,

## 2018-04-04 NOTE — PATIENT INSTRUCTIONS
Spenco Orthotic Arch Supports                               SPENCO Orthotic Arch Supports (AKA Spenco Orthotic Insoles) are ¾ length nylon (plastic arch supports that are covered with Spencos classic green neoprene cushion material. SPENCO Orthotic Arch Supports are designed to support the medial and lateral arch. A SPENCO Orthotic Arch Support is ideal for people that need corrective support, but have tried other higher or harder orthotics and found them to be uncomfortable to wear. The nylon support of the SPENCO Orthotic Arch support is softer and more flexible than plastics used in other, harder orthotics and arch supports. This means that people with naturally lower arches or people with extremely flat feet with find that these arch supports are more comfortable to get used to than other higher arch supports.  May be obtained at:     [employer] or online

## 2018-04-04 NOTE — PROGRESS NOTES
Chief Complaint   Patient presents with    Follow-up     HPI: Patient returns for follow-up to her hypertrophic scar left heel. She excisional biopsy of verruca 2 years ago. She has had steroid injections.  She did do physical therapy but can't afford it, but did help a little. She's had an MRI since our last encounter. The MRI was unremarkable. She says she is actually doing better in that area she's been using some sterile cream. She says last couple weeks it's actually felt okay.    ROS:  Constitution: Negative for chills, fever, weakness and malaise/fatigue.   HEENT: Negative for headaches.   Cardiovascular: Negative for chest pain and claudication.   Respiratory: Negative for cough and shortness of breath.   Musculoskeletal: Positive for foot pain.  Negative for muscle cramps and muscle weakness.   Gastrointestinal: Negative for nausea and vomiting.   Neurological: - for numbness and paresthesias.   Dermatological: - for wound.    Constitutional:  Patient is oriented to person, place, and time. Vital signs are normal.  Appears well-developed and well-nourished.     Vascular:  Dorsalis pedis pulses are 2+ on the right side, and 2+ on the left side.   Posterior tibial pulses are 2+ on the right side, and 2+ on the left side.   + digital hair growth, no swelling, capillary fill time to all toes <3 seconds    Skin/Dermatological:  Healed incision left lateral heel however with decreased hypertrophy, positive for keratosis. Minimal tenderness.    Musculoskeletal:      Full unrestricted range of motion bilateral feet.  She has no tenderness to palpation along the lateral anterior talofibular ligament on the left.  Negative for instability.  No peroneal tendon tenderness.    Neurological:  No deficits to vibratory light touch.    MRI was reviewed with the patient    Assessment/plan:  #1 Hypertrophic scar left heel, or ptosis: Discussion of the problem and the MRI. Continue the sterile cream as needed and this was  renewed. Return to clinic when necessary.

## 2018-04-05 NOTE — PROGRESS NOTES
Subjective:       Patient ID: Maureen Barrera is a 22 y.o. female.    Chief Complaint: Foot Pain (Left foot)    HPI  HPI: Patient returns reporting tenderness to her surgical site which occurred several years ago which she had a wart surgically excised. She says that she still has tenderness to that area. She feels like she is stepping on her outer part of her foot. She feels that her left ankle is very unstable and she has a history of ankle sprains especially when she was doing gymnastics as a child. She states that her left lateral ankles also hurting her especially when she walks.      Past Medical History:   Diagnosis Date    Anxiety     Eczema     Fracture of left foot 2010    GERD (gastroesophageal reflux disease)     Headache(784.0)     Lactose intolerance     Polyp of nasal cavity     Recurrent upper respiratory infection (URI)     Sprain of right ankle 10/26/11    Varicella     2nd grade       Past Surgical History:   Procedure Laterality Date    PLANTAR'S WART EXCISION  january 2015    POLYPECTOMY      Nasal    SPHENOIDECTOMY         Family History   Problem Relation Age of Onset    Allergies Father     Eczema Father     Immunodeficiency Father     Allergies Paternal Aunt     Eczema Paternal Aunt     Allergies Paternal Uncle     Eczema Paternal Uncle     Immunodeficiency Maternal Grandmother     Allergies Paternal Grandmother     Angioedema Paternal Grandmother     Eczema Paternal Grandmother     Allergies Paternal Grandfather     Eczema Paternal Grandfather     Allergic rhinitis Neg Hx     Asthma Neg Hx     Atopy Neg Hx     Rhinitis Neg Hx     Urticaria Neg Hx        Social History     Social History    Marital status: Single     Spouse name: N/A    Number of children: N/A    Years of education: N/A     Occupational History          Social History Main Topics    Smoking status: Never Smoker    Smokeless tobacco: Never Used    Alcohol use No    Drug use:  No    Sexual activity: Not Asked     Other Topics Concern    None     Social History Narrative    Born in [place].  Moved from [place] in 2011.       Current Outpatient Prescriptions   Medication Sig Dispense Refill    cetirizine (ZYRTEC) 10 MG tablet Take 10 mg by mouth once daily.      desonide (DESOWEN) 0.05 % cream Apply topically 2 (two) times daily. 60 g 2    miconazole (MICOTIN) 2 % cream Apply topically 2 (two) times daily.      triamcinolone (NASACORT) 55 mcg nasal inhaler 2 sprays by Nasal route once daily.       No current facility-administered medications for this visit.        Review of patient's allergies indicates:   Allergen Reactions    No known drug allergies     Reglan [metoclopramide hcl]      Mother allergic       Review of Systems  ROS:  Constitution: Negative for chills, fever, weakness and malaise/fatigue.   HEENT: Negative for headaches.   Cardiovascular: Negative for chest pain and claudication.   Respiratory: Negative for cough and shortness of breath.   Musculoskeletal: Positive for left foot pain.  Negative for muscle cramps and muscle weakness.   Gastrointestinal: Negative for nausea and vomiting.   Neurological: Negative for numbness and paresthesias.   Dermatological: Negativefor skin rash, Negative for calluses, Negative for fungal nails, Negative for wound.        Objective:      Physical Exam  Constitutional:  Patient is oriented to person, place, and time. Vital signs are normal.  Appears well-developed and well-nourished.     Vascular:  Dorsalis pedis pulses are 2/4 on the right side, and 2/4 on the left side.   Posterior tibial pulses are 2/4 on the right side, and 2/4 on the left side.   Positive for digital hair growth, capillary fill time to all toes <3 seconds, toes are warm touch, trace pedal swelling    Skin/Dermatological:  Skin is warm and intact.  No cyanosis or clubbing.  No rashes noted.  No open wounds.  Keratosis left plantar lateral heel, mild  tenderness    Musculoskeletal:      Her right foot appears more supinated on her left. She does have tenderness to her left peroneal brevis tendon from ankle to foot and tenderness along the anterior talofibular ligament on the left. No appreciated anterior posterior drawer sign or talar tilt sign.    Neurological:  (--) deficits to sharp/dull, light touch or vibratory sensation bilateral feet, ten points tested.   Muscle strength to tibialis anterior, extensor hallucis longus, extensor digitorum longus, peroneal muscles, flexor hallucis/digotorum longus, posterior tibial and gastrosoleal complex is 5/5, normal tone without assymmetry   Patellar reflexes are 2+ on the right side and 2+ on the left side.  Achilles reflexes are 2+ on the right side and 2+ on the left side.      Assessment:       1. Varus deformities of feet, congenital    2. Left ankle instability    3. Ligament rupture    4. Tear of peroneal tendon, left, initial encounter        Plan:       Varus deformities of feet, congenital  -     MRI Ankle Without Contrast Left; Future; Expected date: 04/04/2018    Left ankle instability  -     MRI Ankle Without Contrast Left; Future; Expected date: 04/04/2018    Ligament rupture  -     MRI Ankle Without Contrast Left; Future; Expected date: 04/04/2018    Tear of peroneal tendon, left, initial encounter  -     MRI Ankle Without Contrast Left; Future; Expected date: 04/04/2018        Discussion of the etiology of the problem/s and the treatment plan.  The keratosis left heel was pared with a 15 blade. I recommended Spenco orthotics to add some stability to her foot and quality athletic shoes or walking shoes. MRI to further assess for any ligamentous or tendon tear.  We will have her come back once the MRI is completed.

## 2018-04-18 ENCOUNTER — TELEPHONE (OUTPATIENT)
Dept: OTOLARYNGOLOGY | Facility: CLINIC | Age: 23
End: 2018-04-18

## 2018-04-18 ENCOUNTER — OFFICE VISIT (OUTPATIENT)
Dept: FAMILY MEDICINE | Facility: CLINIC | Age: 23
End: 2018-04-18
Payer: COMMERCIAL

## 2018-04-18 ENCOUNTER — OFFICE VISIT (OUTPATIENT)
Dept: OBSTETRICS AND GYNECOLOGY | Facility: CLINIC | Age: 23
End: 2018-04-18
Payer: COMMERCIAL

## 2018-04-18 VITALS
BODY MASS INDEX: 33.4 KG/M2 | DIASTOLIC BLOOD PRESSURE: 68 MMHG | HEIGHT: 63 IN | SYSTOLIC BLOOD PRESSURE: 102 MMHG | HEART RATE: 98 BPM | WEIGHT: 188.5 LBS | TEMPERATURE: 99 F | RESPIRATION RATE: 14 BRPM

## 2018-04-18 VITALS
WEIGHT: 188.5 LBS | HEIGHT: 63 IN | BODY MASS INDEX: 33.4 KG/M2 | SYSTOLIC BLOOD PRESSURE: 155 MMHG | DIASTOLIC BLOOD PRESSURE: 60 MMHG

## 2018-04-18 DIAGNOSIS — F41.9 ANXIETY: Primary | ICD-10-CM

## 2018-04-18 DIAGNOSIS — Z12.4 PAP SMEAR FOR CERVICAL CANCER SCREENING: ICD-10-CM

## 2018-04-18 DIAGNOSIS — N94.6 DYSMENORRHEA: ICD-10-CM

## 2018-04-18 DIAGNOSIS — Z30.09 ENCOUNTER FOR OTHER GENERAL COUNSELING OR ADVICE ON CONTRACEPTION: ICD-10-CM

## 2018-04-18 DIAGNOSIS — N92.0 MENORRHAGIA WITH REGULAR CYCLE: Primary | ICD-10-CM

## 2018-04-18 PROCEDURE — 99203 OFFICE O/P NEW LOW 30 MIN: CPT | Mod: S$GLB,,, | Performed by: OBSTETRICS & GYNECOLOGY

## 2018-04-18 PROCEDURE — 99999 PR PBB SHADOW E&M-EST. PATIENT-LVL III: CPT | Mod: PBBFAC,,, | Performed by: OBSTETRICS & GYNECOLOGY

## 2018-04-18 PROCEDURE — 99214 OFFICE O/P EST MOD 30 MIN: CPT | Mod: S$GLB,,, | Performed by: NURSE PRACTITIONER

## 2018-04-18 RX ORDER — NORGESTIMATE AND ETHINYL ESTRADIOL 7DAYSX3 LO
1 KIT ORAL DAILY
Qty: 28 TABLET | Refills: 3 | Status: SHIPPED | OUTPATIENT
Start: 2018-04-18 | End: 2018-07-10 | Stop reason: SDUPTHER

## 2018-04-18 RX ORDER — CITALOPRAM 10 MG/1
10 TABLET ORAL DAILY
Qty: 30 TABLET | Refills: 4 | Status: SHIPPED | OUTPATIENT
Start: 2018-04-18 | End: 2018-07-10

## 2018-04-18 NOTE — PROGRESS NOTES
"Chief Complaint   Patient presents with    new patient    Dysmenorrhea       History of Present Illness   22 y.o.  female patient presents today for heavy painful cycles, never been sexually active, but need annual PAP screening. Counseled on options, no contraindications to oral contraceptive pills. Agreed to OCP trial and NSAIDS with cycle. No bowel or bladder complaints.     Past medical and surgical history reviewed.   I have reviewed the patient's medical history in detail and updated the computerized patient record.    Review of patient's allergies indicates:   Allergen Reactions    No known drug allergies     Reglan [metoclopramide hcl]      Mother allergic         Review of Systems - Negative except HPI  GEN ROS: negative for - chills or fever  Breast ROS: negative for breast lumps  Genito-Urinary ROS: no dysuria, trouble voiding, or hematuria      Physical Examination:  BP (!) 155/60   Ht 5' 3" (1.6 m)   Wt 85.5 kg (188 lb 7.9 oz)   LMP 04/02/2018 (Approximate)   BMI 33.39 kg/m²    Constitutional: She is oriented to person, place, and time. She appears well-developed and well-nourished. No distress. overweight  HENT:   Head: Normocephalic and atraumatic.   Eyes: Conjunctivae and EOM are normal. No scleral icterus.   Neck: Normal range of motion. Neck supple. No tracheal deviation present.   Cardiovascular: Normal rate.    Musculoskeletal: Normal range of motion.   Neurological: She is alert and oriented to person, place, and time. Coordination normal.   Skin: Skin is warm and dry. She is not diaphoretic.   Psychiatric: She has a normal mood and affect.          Assessment:  Menorrhagia / dysmenorrhea - counseled    Plan:  oral contraceptive pills   Follow up 3 months for exam and PAP  15 minutes spent with patient with > 1/2 time in counseling.    Patient informed will be contacted with results within 2 weeks. Encouraged to please call back or email if she has not heard from us by " then.

## 2018-04-18 NOTE — LETTER
April 18, 2018      Bree Stovall, PAUL  [address] [address] [address]           [facility] at [address], [address]  [facility] [address]  Phone: [phone]  Fax: [phone]          Patient: Maureen Barrera   MR Number: [id number]   YOB: 1995   Date of Visit: 4/18/2018       Dear Bree Stovall:    Thank you for referring Maureen Barrera to me for evaluation. Attached you will find relevant portions of my assessment and plan of care.    If you have questions, please do not hesitate to call me. I look forward to following Maureen Barrera along with you.    Sincerely,    Raheem Calle MD    Enclosure  CC:  No Recipients    If you would like to receive this communication electronically, please contact [email or web address] or [phone] to request more information on [facility] access.    For providers and/or their staff who would like to refer a patient to [facility], please contact us through our one-stop-shop provider referral line, [facility], at [phone].    If you feel you have received this communication in error or would no longer like to receive these types of communications, please e-mail [email or web address]

## 2018-04-18 NOTE — PROGRESS NOTES
"Subjective:       Patient ID: Maureen Barrera is a 22 y.o. female.    Chief Complaint: stress, discuss medication    HPI finds that her anxiety is really high and under a lot of stress. More short tempered. Not sleeping well. Feels out of control with what she has to do. Was on celexa in the past that helped. States she is finishing school, will graduate and here at the end her stress is high.  Feeling overwhelmed. No chest pain or palpitations. No panic attacks. She also would like referral to gynecology to  Discuss birth control. She is due for her first pap as well. She wants  To see Dr. Calle.  No other concerns today. See ROS.    The following portion of the patients history was reviewed and updated as appropriate: allergies, current medications, past medical and surgical history. Past social history and problem list reviewed. Family PMH and Past social history reviewed. Tobacco, Illicit drug use reviewed.     Review of Systems  Constitutional: No fatigue or fever    Skin: no rashes or lesions  Respiratory:   No SOB, Wheezing, cough  Cardiovascular:   No CP, Palpitations  Gastrointestinal:   No N/V/D. No abdominal pain, weight stable. Appetite good.   Musculoskeletal:   No joint pain  No change in gait or coordination. .  Neurological:   No dizziness. No headaches  Hematological: No abnormal bruising or bleeding    Psychiatric/Behavioral Negative for suicidal ideas.  Denies feelings of depression. No thoughts of wanting to harm self or others. high anxiety. Feeling overwhelmed.    Objective:     /68   Pulse 98   Temp 98.5 °F (36.9 °C) (Oral)   Resp 14   Ht 5' 3" (1.6 m)   Wt 85.5 kg (188 lb 7.9 oz)   LMP 04/02/2018 (Approximate)   BMI 33.39 kg/m²      Physical Exam     Constitutional: oriented to person, place, and time. well-developed and well-nourished.   Cardiovascular: Normal rate, regular rhythm and normal heart sounds.    Pulmonary/Chest: Effort normal and breath sounds normal. No " respiratory distress. No wheezes.   Abdominal: Soft. Bowel sounds are normal. No distension. There is no tenderness.   Musculoskeletal: Normal range of motion. Gait and coordination normal. .   Neurological: oriented to person, place, and time.   Skin: Skin is warm and dry. No rashes or lesions  Psychiatric: Normal mood and affect.Behavior is normal. Judgment and thought content normal. she does not present as a threat  To herself or others.  Assessment:       1. Anxiety    2. Encounter for other general counseling or advice on contraception        Plan:         Maureen was seen today for stress, discuss medication.    Diagnoses and all orders for this visit:    Anxiety: will start her back on her  Celexa. Take as directed.     Encounter for other general counseling or advice on contraception: will schedule her with Dr. Calle.   -     Ambulatory referral to Obstetrics / Gynecology    Other orders  -     citalopram (CELEXA) 10 MG tablet; Take 1 tablet (10 mg total) by mouth once daily.    Continue current medication  Take medications only as prescribed  Healthy diet, exercise  Adequate rest  Adequate hydration  Avoid allergens  Avoid excessive caffeine

## 2018-04-18 NOTE — TELEPHONE ENCOUNTER
[facility] clinic to schedule CT scan.  Letter mailed to call the clinic to schedule CT scan.

## 2018-05-09 ENCOUNTER — TELEPHONE (OUTPATIENT)
Dept: PODIATRY | Facility: CLINIC | Age: 23
End: 2018-05-09

## 2018-05-09 NOTE — TELEPHONE ENCOUNTER
----- Message from Shila Anguiano sent at 5/9/2018  9:44 AM CDT -----  Contact: Self  Pt is calling because she says he had an MRI some time ago and has not received the results.    She is requesting Staff contact her at [phone].    Thank you.    Called and spoke with patient about MRI I will have  give patient a call results.

## 2018-05-10 ENCOUNTER — DOCUMENTATION ONLY (OUTPATIENT)
Dept: PODIATRY | Facility: CLINIC | Age: 23
End: 2018-05-10

## 2018-05-10 DIAGNOSIS — M25.372 INSTABILITY OF LEFT ANKLE JOINT: Primary | ICD-10-CM

## 2018-05-10 NOTE — PROGRESS NOTES
I spoke to patient about her MRI, she is still reporting left lateral ankle pain, chronic instability.  Most of her pain is the plantar lateral heel and anterior lateral ankle.  I di discuss the ligament injuries and findings in the posterior aspect.  I feel that Dr. Donovan would be be able to help her and discuss surgical options and a referral was made.

## 2018-05-16 DIAGNOSIS — M25.572 LEFT ANKLE PAIN, UNSPECIFIED CHRONICITY: Primary | ICD-10-CM

## 2018-05-17 ENCOUNTER — TELEPHONE (OUTPATIENT)
Dept: ORTHOPEDICS | Facility: CLINIC | Age: 23
End: 2018-05-17

## 2018-05-17 NOTE — TELEPHONE ENCOUNTER
----- Message from Loan Iris sent at 5/17/2018 10:16 AM CDT -----  Contact: Maureen  Patient is calling to let know she has x-rays. Please call [phone] and will have to leave message as going into work and will be involved in surgery cases today. Thanks!

## 2018-05-22 ENCOUNTER — OFFICE VISIT (OUTPATIENT)
Dept: FAMILY MEDICINE | Facility: CLINIC | Age: 23
End: 2018-05-22
Payer: COMMERCIAL

## 2018-05-22 VITALS
WEIGHT: 182.81 LBS | RESPIRATION RATE: 14 BRPM | SYSTOLIC BLOOD PRESSURE: 110 MMHG | BODY MASS INDEX: 32.39 KG/M2 | HEART RATE: 102 BPM | HEIGHT: 63 IN | DIASTOLIC BLOOD PRESSURE: 68 MMHG | TEMPERATURE: 100 F

## 2018-05-22 DIAGNOSIS — R53.83 FATIGUE, UNSPECIFIED TYPE: Primary | ICD-10-CM

## 2018-05-22 DIAGNOSIS — B34.9 VIRAL SYNDROME: ICD-10-CM

## 2018-05-22 DIAGNOSIS — E86.0 DEHYDRATION: ICD-10-CM

## 2018-05-22 PROBLEM — R09.81 NASAL CONGESTION: Status: RESOLVED | Noted: 2017-12-11 | Resolved: 2018-05-22

## 2018-05-22 LAB
CTP QC/QA: YES
HETEROPH AB SER QL: NEGATIVE

## 2018-05-22 PROCEDURE — 99214 OFFICE O/P EST MOD 30 MIN: CPT | Mod: S$GLB,,, | Performed by: NURSE PRACTITIONER

## 2018-05-22 PROCEDURE — 3008F BODY MASS INDEX DOCD: CPT | Mod: CPTII,S$GLB,, | Performed by: NURSE PRACTITIONER

## 2018-05-22 PROCEDURE — 86308 HETEROPHILE ANTIBODY SCREEN: CPT | Mod: QW,,, | Performed by: NURSE PRACTITIONER

## 2018-05-22 RX ORDER — ONDANSETRON 4 MG/1
4 TABLET, FILM COATED ORAL EVERY 6 HOURS PRN
COMMUNITY
End: 2018-05-22 | Stop reason: SDUPTHER

## 2018-05-22 RX ORDER — ONDANSETRON HYDROCHLORIDE 8 MG/1
8 TABLET, FILM COATED ORAL EVERY 6 HOURS PRN
Qty: 20 TABLET | Refills: 1 | Status: SHIPPED | OUTPATIENT
Start: 2018-05-22 | End: 2018-11-08 | Stop reason: SDUPTHER

## 2018-05-22 NOTE — PROGRESS NOTES
Subjective:       Patient ID: Maureen Barrera is a 22 y.o. female.    Chief Complaint: saturday vomited, sleeping alot, nausea, eating very little    HPI onset Saturday with vomiting, nausea. Slept a lot all day Sunday. Nausea off and on. Able to eat small amount and bland diet. Had some zofran at home and that helped. She had fever up to 103. Took some tylenol and fever finally broke Monday. Now just low grade. Feeling flushed and having chills at times. Headache Saturday and Sunday. Stomach cramping. Diarrhea on Saturday but that is better. States multiple friends have mono. No sore throat. States it felt like she was vomiting acid. She has not been drinking much fluids. States she is feeling better, just getting there slowly. No other concerns.    She has appointment in July with Dr. Calle for her pap. She is doing well on the BCP he started her on.     The following portion of the patients history was reviewed and updated as appropriate: allergies, current medications, past medical and surgical history. Past social history and problem list reviewed. Family PMH and Past social history reviewed. Tobacco, Illicit drug use reviewed.     Review of Systems   Constitutional: Positive for appetite change, chills, fatigue and fever.   HENT: Negative for congestion, sinus pain, sinus pressure and sore throat.    Eyes: Negative for visual disturbance.   Respiratory: Negative for cough and shortness of breath.    Cardiovascular: Negative for chest pain, palpitations and leg swelling.   Gastrointestinal: Positive for abdominal pain (cramping off and on), diarrhea (resolved) and nausea. Negative for constipation and vomiting (resolved).   Genitourinary: Negative for dysuria.   Musculoskeletal: Positive for myalgias. Negative for arthralgias.   Neurological: Positive for headaches (on and off. not at this time). Negative for dizziness.       Objective:     /68   Pulse 102   Temp 99.5 °F (37.5 °C) (Oral)   Resp 14   " Ht 5' 3" (1.6 m)   Wt 82.9 kg (182 lb 12.8 oz)   LMP 05/01/2018 (Approximate)   BMI 32.38 kg/m²      Physical Exam    Constitutional: oriented to person, place, and time. well-developed and well-nourished.   HENT: normal nares, throat clear.   Head: Normocephalic.   Eyes: Conjunctivae are normal. Pupils are equal, round, and reactive to light.   Neck: Normal range of motion. Neck supple. No tracheal deviation present. No thyromegaly present. no enlarged or tender anterior cervical lymph nodes or supra clavicular nodes.  Cardiovascular: Normal rate, regular rhythm and normal heart sounds.    Pulmonary/Chest: Effort normal and breath sounds normal. No respiratory distress. No wheezes.   Abdominal: Soft. Bowel sounds are normal. No distension. There is some mild generalized upper abdomen tenderness. no rebound  Musculoskeletal: Normal range of motion.gait and coordination normal.   .   Assessment:       1. Fatigue, unspecified type    2. Viral syndrome    3. Dehydration        Plan:         Maureen was seen today for saturday vomited, sleeping alot, nausea, eating very little.    Diagnoses and all orders for this visit:    Fatigue, unspecified type  -     POCT Infectious mononucleosis antibody: mono  Is negative.    Viral syndrome: presents as viral illness. She is improving. Needs to eat bland diet, advance as tolerated. Can continue the zofran prn nausea.    Dehydration: hydrate well. Need to increase fluids.    Other orders  -     ondansetron (ZOFRAN) 8 MG tablet; Take 1 tablet (8 mg total) by mouth every 6 (six) hours as needed for Nausea.    Continue current medication  Take medications only as prescribed  Healthy diet  Adequate rest  Adequate hydration  Avoid allergens  Avoid excessive caffeine  "

## 2018-05-22 NOTE — LETTER
May 22, 2018      [facility]  [address] [address] [address]  [facility] [address]  Phone: [phone]  Fax: [phone]       Patient: Maureen Barrera   YOB: 1995  Date of Visit: 05/22/2018    To Whom It May Concern:    Diallo Barrera  was at [facility] on 05/22/2018. She may return to work/school on 5/24/18 with no restrictions. If you have any questions or concerns, or if I can be of further assistance, please do not hesitate to contact me.    Sincerely,    EDUARDO Oshea

## 2018-05-24 ENCOUNTER — OFFICE VISIT (OUTPATIENT)
Dept: ORTHOPEDICS | Facility: CLINIC | Age: 23
End: 2018-05-24
Payer: COMMERCIAL

## 2018-05-24 VITALS
HEIGHT: 63 IN | WEIGHT: 182.75 LBS | DIASTOLIC BLOOD PRESSURE: 80 MMHG | HEART RATE: 84 BPM | SYSTOLIC BLOOD PRESSURE: 122 MMHG | BODY MASS INDEX: 32.38 KG/M2

## 2018-05-24 DIAGNOSIS — M25.372 ANKLE INSTABILITY, LEFT: ICD-10-CM

## 2018-05-24 PROCEDURE — 99999 PR PBB SHADOW E&M-EST. PATIENT-LVL III: CPT | Mod: PBBFAC,,, | Performed by: ORTHOPAEDIC SURGERY

## 2018-05-24 PROCEDURE — 99244 OFF/OP CNSLTJ NEW/EST MOD 40: CPT | Mod: S$GLB,,, | Performed by: ORTHOPAEDIC SURGERY

## 2018-05-24 NOTE — LETTER
May 25, 2018      Raheem Reid, EFRA  [address] [address]           [facility] [address] [facility] [address] [place] [address]  Phone: [phone]          Patient: Maureen Barrera   MR Number: [id number]   YOB: 1995   Date of Visit: 5/24/2018       Dear Dr. Raheem Reid:    Thank you for referring Maureen Barrera to me for evaluation. Attached you will find relevant portions of my assessment and plan of care.    If you have questions, please do not hesitate to call me. I look forward to following Maureen Barrera along with you.    Sincerely,    Jimmie Red MD    Enclosure  CC:  No Recipients    If you would like to receive this communication electronically, please contact [email or web address] or [phone] to request more information on [facility] [employer] access.    For providers and/or their staff who would like to refer a patient to [facility], please contact us through our one-stop-shop provider referral line, [facility], at [phone].    If you feel you have received this communication in error or would no longer like to receive these types of communications, please e-mail [email or web address]

## 2018-05-25 PROBLEM — M25.372 ANKLE INSTABILITY, LEFT: Status: ACTIVE | Noted: 2018-05-25

## 2018-05-25 PROBLEM — L25.9 CONTACT DERMATITIS: Status: RESOLVED | Noted: 2017-12-20 | Resolved: 2018-05-25

## 2018-05-25 NOTE — PROGRESS NOTES
"HPI: Maureen Barrera is a  22 y.o. female who was referred to me by Dr. Reid and was seen in consultation today for left ankle pain. 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 She says the ankle gives out about once a day. She works as a . She rates her pain as 0/10 a rest. The pain is worse with walking and standing.     PAST MEDICAL/SURGICAL/FAMILY/SOCIAL/ HISTORY: REVIEWED    ALLERGIES/MEDICATIONS: REVIEWED       Review of Systems:     Constitution: Negative.   HEENT: Negative.   Eyes: Negative.   Cardiovascular: Negative.   Respiratory: Negative.   Endocrine: Negative.   Hematologic/Lymphatic: Negative.   Skin: Negative.   Musculoskeletal: Positive for left ankle pain   Gastrointestinal: Negative.   Genitourinary: Negative.   Neurological: Negative.   Psychiatric/Behavioral: Negative.   Allergic/Immunologic: Negative.       PHYSICAL EXAM:  Vitals:    05/24/18 1610   BP: 122/80   Pulse: 84     Ht Readings from Last 1 Encounters:   05/24/18 5' 3" (1.6 m)     Wt Readings from Last 1 Encounters:   05/24/18 82.9 kg (182 lb 12.2 oz)       GENERAL: Well developed, well nourished, no acute distress. Mild generalized ligamentous laxity.  SKIN: Skin is intact. No atrophy, abrasions or lesions are noted.   Neurological: Normal mental status. Appropriate and conversant. Alert and oriented x 3.  GAIT: Walks with non-antalgic gait.    Left  lower extremity compared with RLE:  2+ dorsalis pedis pulse.  Capillary refill < 3 seconds.  Normal range of motion tibiotalar and subtalar joints. Normal alignment of the forefoot and the hindfoot.  5/5 strength EHL, FHL, tibialis anterior, gastrocsoleus, tibialis posterior and peroneals. Sensation to light touch intact sural, saphenous, superficial peroneal and deep peroneal nerves. Mild swelling lateral ankle. No ecchymosis or deformity. No lymphadenopathy, no " masses or tumors palpated.   tenderness to palpation at ATFL. 2+ anterior drawer test. No pain with rom of the FHL. non-tender to palpation  posterior ankle.     XRAYS:   3 views of left ankle reviewed today reveal No evidence of fractures or dislocations. Os trigonum.    MRI of left ankle reviewed today reveal No evidence of fractures or dislocations. Chronic tear of ATFL. Mild inflammation posterior ankle.     ASSESSMENT:        Encounter Diagnosis   Name Primary?    Ankle instability, left         PLAN:  I spent 25 minutes in consulation with the patient and her father today. More than half the time was spent counseling the patient on her condition and the options for operative versus non-operative care.  I recommend left ankle arthroscopy and brostrom-jefferson lateral ligament reconstruction as she has failed conservative treatment. She is going to f/u when ready for surgery.

## 2018-05-29 ENCOUNTER — TELEPHONE (OUTPATIENT)
Dept: FAMILY MEDICINE | Facility: CLINIC | Age: 23
End: 2018-05-29

## 2018-05-29 ENCOUNTER — OFFICE VISIT (OUTPATIENT)
Dept: FAMILY MEDICINE | Facility: CLINIC | Age: 23
End: 2018-05-29
Payer: COMMERCIAL

## 2018-05-29 VITALS
HEART RATE: 80 BPM | BODY MASS INDEX: 32.46 KG/M2 | HEIGHT: 63 IN | SYSTOLIC BLOOD PRESSURE: 110 MMHG | DIASTOLIC BLOOD PRESSURE: 70 MMHG | WEIGHT: 183.19 LBS | RESPIRATION RATE: 18 BRPM | TEMPERATURE: 98 F

## 2018-05-29 DIAGNOSIS — R11.0 NAUSEA: ICD-10-CM

## 2018-05-29 DIAGNOSIS — R10.9 ABDOMINAL PAIN, UNSPECIFIED ABDOMINAL LOCATION: ICD-10-CM

## 2018-05-29 DIAGNOSIS — R53.83 FATIGUE, UNSPECIFIED TYPE: Primary | ICD-10-CM

## 2018-05-29 LAB
CTP QC/QA: YES
HETEROPH AB SER QL: NEGATIVE

## 2018-05-29 PROCEDURE — 3008F BODY MASS INDEX DOCD: CPT | Mod: CPTII,S$GLB,, | Performed by: NURSE PRACTITIONER

## 2018-05-29 PROCEDURE — 99214 OFFICE O/P EST MOD 30 MIN: CPT | Mod: S$GLB,,, | Performed by: NURSE PRACTITIONER

## 2018-05-29 PROCEDURE — 86308 HETEROPHILE ANTIBODY SCREEN: CPT | Mod: QW,,, | Performed by: NURSE PRACTITIONER

## 2018-05-29 NOTE — PROGRESS NOTES
"Subjective:       Patient ID: Maureen Barrera is a 22 y.o. female.    Chief Complaint: Fatigue (Not better from 5/22); Emesis; and Generalized Body Aches    The patient is here to discuss persistent symptoms.  She states over the past couple weeks she has been suffering with severe fatigue, nausea and vomiting,  generalized malaise.  She states she felt very nauseated and vomited this morning and now she feels much better.  She does state that she  he did have a sore throat at some point but this has gotten better.  No URI symptoms.  She does feel like her abdomen is sore over the epigastric area.      Emesis    Associated symptoms include abdominal pain, arthralgias and myalgias. Pertinent negatives include no chills, diarrhea, fever or headaches.     Review of Systems   Constitutional: Positive for fatigue. Negative for appetite change, chills and fever.   HENT: Negative for ear pain and postnasal drip.    Eyes: Negative for pain and itching.   Respiratory: Negative for chest tightness and shortness of breath.    Gastrointestinal: Positive for abdominal pain, nausea and vomiting. Negative for abdominal distention, anal bleeding, blood in stool, constipation and diarrhea.   Endocrine: Negative for polydipsia and polyuria.   Genitourinary: Negative for difficulty urinating and flank pain.   Musculoskeletal: Positive for arthralgias and myalgias.   Skin: Negative for color change and pallor.   Neurological: Negative for light-headedness and headaches.   Hematological: Negative for adenopathy. Does not bruise/bleed easily.   Psychiatric/Behavioral: Negative for agitation.       Objective:       Vitals:    05/29/18 1404   BP: 110/70   Pulse: 80   Resp: 18   Temp: 98.4 °F (36.9 °C)   TempSrc: Oral   Weight: 83.1 kg (183 lb 3.2 oz)   Height: 5' 3" (1.6 m)   PainSc:   3   PainLoc: Generalized       Physical Exam   Constitutional: She is oriented to person, place, and time. She appears well-developed. No distress. "   Obese female   HENT:   Head: Normocephalic and atraumatic.   Right Ear: Hearing, tympanic membrane, external ear and ear canal normal.   Left Ear: Hearing, tympanic membrane, external ear and ear canal normal.   Nose: Nose normal.   Mouth/Throat: Uvula is midline, oropharynx is clear and moist and mucous membranes are normal.   Eyes: Conjunctivae and EOM are normal. Pupils are equal, round, and reactive to light. Right eye exhibits no discharge. Left eye exhibits no discharge.   Neck: Trachea normal and normal range of motion. Neck supple. No JVD present. Carotid bruit is not present. No thyromegaly present.   Cardiovascular: Normal rate and regular rhythm.  Exam reveals no gallop and no friction rub.    No murmur heard.  Pulmonary/Chest: Effort normal and breath sounds normal. No respiratory distress. She has no wheezes. She has no rales. She exhibits no tenderness.   Abdominal: Soft. Bowel sounds are normal. She exhibits no distension and no mass. There is no tenderness. There is no rebound and no guarding.   Pain over RUQ/epigastric area   Musculoskeletal: Normal range of motion.   Neurological: She is alert and oriented to person, place, and time. Coordination normal.   Skin: Skin is warm and dry. She is not diaphoretic.   Psychiatric: She has a normal mood and affect. Her behavior is normal. Judgment and thought content normal.       Assessment:       1. Fatigue, unspecified type    2. Nausea    3. Abdominal pain, unspecified abdominal location        Plan:       Maureen was seen today for fatigue, emesis and generalized body aches.    Diagnoses and all orders for this visit:    Fatigue, unspecified type  -     POCT Infectious mononucleosis antibody  Results for orders placed or performed in visit on 05/29/18   POCT Infectious mononucleosis antibody   Result Value Ref Range    Monospot Negative Negative     Acceptable Yes          Nausea    -     US Abdomen Limited    Abdominal pain, unspecified  abdominal location    -     US Abdomen Limited; Future  -     CBC auto differential; Future  -     Comprehensive metabolic panel; Future  -     CBC auto differential  -     Comprehensive metabolic panel  -     US Abdomen Limited

## 2018-05-29 NOTE — TELEPHONE ENCOUNTER
----- Message from Anny Riggins sent at 5/29/2018 12:10 PM CDT -----  Contact: PT  Type: Needs Medical Advice    Who Called:  Maureen Barrera  Symptoms (please be specific):  Vomiting, headaches  How long has patient had these symptoms:  Almost two weeks  Pharmacy name and phone #:    Best Call Back Number:   Additional Information: Pt is requesting to speak with doctor , please call back and advise.

## 2018-05-30 ENCOUNTER — TELEPHONE (OUTPATIENT)
Dept: FAMILY MEDICINE | Facility: CLINIC | Age: 23
End: 2018-05-30

## 2018-05-30 NOTE — TELEPHONE ENCOUNTER
----- Message from Ahmet Huffman sent at 5/30/2018 10:42 AM CDT -----  Contact: self [phone]  Type: Needs Medical Advice    Who Called:  Self   Symptoms (please be specific):  NAHow long has patient had these symptoms:  RENATA  Pharmacy name and phone #:  [facility]   Best Call Back Number: [phone]  Additional Information: Patient was seen yesterday, asking to speak with the Ms Fernando regarding health issues.

## 2018-05-30 NOTE — TELEPHONE ENCOUNTER
Pt wanted to speak with Shabbir King in regards with issue discussed with yesterdays appointment.

## 2018-06-02 ENCOUNTER — PATIENT MESSAGE (OUTPATIENT)
Dept: FAMILY MEDICINE | Facility: CLINIC | Age: 23
End: 2018-06-02

## 2018-06-02 DIAGNOSIS — R79.89 ELEVATED LFTS: Primary | ICD-10-CM

## 2018-06-02 LAB
ALBUMIN SERPL-MCNC: 5.2 G/DL (ref 3.5–5.5)
ALBUMIN/GLOB SERPL: 1.4 {RATIO} (ref 1.2–2.2)
ALP SERPL-CCNC: 91 IU/L (ref 39–117)
ALT SERPL-CCNC: 221 IU/L (ref 0–32)
AST SERPL-CCNC: 101 IU/L (ref 0–40)
BASOPHILS # BLD AUTO: 0 X10E3/UL (ref 0–0.2)
BASOPHILS NFR BLD AUTO: 1 %
BILIRUB SERPL-MCNC: 0.2 MG/DL (ref 0–1.2)
BUN SERPL-MCNC: 14 MG/DL (ref 6–20)
BUN/CREAT SERPL: 18 (ref 9–23)
CALCIUM SERPL-MCNC: 9.6 MG/DL (ref 8.7–10.2)
CHLORIDE SERPL-SCNC: 105 MMOL/L (ref 96–106)
CO2 SERPL-SCNC: 19 MMOL/L (ref 18–29)
CREAT SERPL-MCNC: 0.76 MG/DL (ref 0.57–1)
EOSINOPHIL # BLD AUTO: 0.1 X10E3/UL (ref 0–0.4)
EOSINOPHIL NFR BLD AUTO: 1 %
ERYTHROCYTE [DISTWIDTH] IN BLOOD BY AUTOMATED COUNT: 15.1 % (ref 12.3–15.4)
GFR SERPLBLD CREATININE-BSD FMLA CKD-EPI: 112 ML/MIN/1.73
GFR SERPLBLD CREATININE-BSD FMLA CKD-EPI: 130 ML/MIN/1.73
GLOBULIN SER CALC-MCNC: 3.8 G/DL (ref 1.5–4.5)
GLUCOSE SERPL-MCNC: 87 MG/DL (ref 65–99)
HCT VFR BLD AUTO: 39 % (ref 34–46.6)
HGB BLD-MCNC: 12.8 G/DL (ref 11.1–15.9)
IMM GRANULOCYTES # BLD: 0 X10E3/UL (ref 0–0.1)
IMM GRANULOCYTES NFR BLD: 0 %
LYMPHOCYTES # BLD AUTO: 2.6 X10E3/UL (ref 0.7–3.1)
LYMPHOCYTES NFR BLD AUTO: 33 %
MCH RBC QN AUTO: 24.9 PG (ref 26.6–33)
MCHC RBC AUTO-ENTMCNC: 32.8 G/DL (ref 31.5–35.7)
MCV RBC AUTO: 76 FL (ref 79–97)
MONOCYTES # BLD AUTO: 0.4 X10E3/UL (ref 0.1–0.9)
MONOCYTES NFR BLD AUTO: 5 %
NEUTROPHILS # BLD AUTO: 4.6 X10E3/UL (ref 1.4–7)
NEUTROPHILS NFR BLD AUTO: 60 %
PLATELET # BLD AUTO: 350 X10E3/UL (ref 150–379)
POTASSIUM SERPL-SCNC: 4.3 MMOL/L (ref 3.5–5.2)
PROT SERPL-MCNC: 9 G/DL (ref 6–8.5)
RBC # BLD AUTO: 5.15 X10E6/UL (ref 3.77–5.28)
SODIUM SERPL-SCNC: 144 MMOL/L (ref 134–144)
WBC # BLD AUTO: 7.8 X10E3/UL (ref 3.4–10.8)

## 2018-06-04 NOTE — TELEPHONE ENCOUNTER
----- Message from Kassie Prince sent at 6/4/2018  4:33 PM CDT -----  Contact: patient   Patient calling to speak to the Nurse regarding results. Please advise.   Call back      Thanks!

## 2018-06-06 ENCOUNTER — TELEPHONE (OUTPATIENT)
Dept: ORTHOPEDICS | Facility: CLINIC | Age: 23
End: 2018-06-06

## 2018-06-06 NOTE — TELEPHONE ENCOUNTER
Spoke to patient. Will call her back tomorrow with the prices of crutches and a boot. Patient stated understanding.

## 2018-06-06 NOTE — TELEPHONE ENCOUNTER
----- Message from Cyndee Smith sent at 6/6/2018  3:20 PM CDT -----  Contact: Patient  Patient would like to speak with [place] regarding her upcoming surgery.  Call Back#[phone]  Thanks

## 2018-06-06 NOTE — TELEPHONE ENCOUNTER
Not sure about the rash so I would like her to monitor this but I would like to book her with GI.  We have to find out why her liver enzymes are so elevated.

## 2018-06-08 ENCOUNTER — PATIENT MESSAGE (OUTPATIENT)
Dept: ORTHOPEDICS | Facility: CLINIC | Age: 23
End: 2018-06-08

## 2018-06-12 ENCOUNTER — PATIENT MESSAGE (OUTPATIENT)
Dept: ORTHOPEDICS | Facility: CLINIC | Age: 23
End: 2018-06-12

## 2018-06-13 ENCOUNTER — PATIENT MESSAGE (OUTPATIENT)
Dept: GASTROENTEROLOGY | Facility: CLINIC | Age: 23
End: 2018-06-13

## 2018-06-13 NOTE — TELEPHONE ENCOUNTER
She must think a CBC is what checks her liver I believe she is mistaken it for CMP but I have put in a hepatic function, last CBC was fine

## 2018-06-14 ENCOUNTER — PATIENT MESSAGE (OUTPATIENT)
Dept: ORTHOPEDICS | Facility: CLINIC | Age: 23
End: 2018-06-14

## 2018-06-14 LAB
ALBUMIN SERPL-MCNC: 4.5 G/DL (ref 3.5–5.5)
ALP SERPL-CCNC: 73 IU/L (ref 39–117)
ALT SERPL-CCNC: 35 IU/L (ref 0–32)
AST SERPL-CCNC: 22 IU/L (ref 0–40)
BILIRUB DIRECT SERPL-MCNC: 0.08 MG/DL (ref 0–0.4)
BILIRUB SERPL-MCNC: 0.3 MG/DL (ref 0–1.2)
EBV VCA IGG SER IA-ACNC: <18 U/ML (ref 0–17.9)
EBV VCA IGM SER IA-ACNC: <36 U/ML (ref 0–35.9)
PROT SERPL-MCNC: 7.7 G/DL (ref 6–8.5)

## 2018-06-19 ENCOUNTER — OFFICE VISIT (OUTPATIENT)
Dept: ORTHOPEDICS | Facility: CLINIC | Age: 23
End: 2018-06-19
Payer: COMMERCIAL

## 2018-06-19 VITALS — WEIGHT: 183.19 LBS | BODY MASS INDEX: 32.46 KG/M2 | HEIGHT: 63 IN

## 2018-06-19 DIAGNOSIS — M25.372 ANKLE INSTABILITY, LEFT: Primary | ICD-10-CM

## 2018-06-19 PROCEDURE — 99214 OFFICE O/P EST MOD 30 MIN: CPT | Mod: S$GLB,,, | Performed by: ORTHOPAEDIC SURGERY

## 2018-06-19 PROCEDURE — 99999 PR PBB SHADOW E&M-EST. PATIENT-LVL II: CPT | Mod: PBBFAC,,, | Performed by: ORTHOPAEDIC SURGERY

## 2018-06-19 PROCEDURE — 3008F BODY MASS INDEX DOCD: CPT | Mod: CPTII,S$GLB,, | Performed by: ORTHOPAEDIC SURGERY

## 2018-06-19 NOTE — PROGRESS NOTES
"HPI: Maureen Barrera is a  22 y.o. female who was referred to me by Dr. Reid and is here for f/u today for left ankle pain. 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 She says the ankle gives out about once a day. She works as a . She rates her pain as 0/10 a rest. The pain is worse with walking and standing.     PAST MEDICAL/SURGICAL/FAMILY/SOCIAL/ HISTORY: REVIEWED    ALLERGIES/MEDICATIONS: REVIEWED       Review of Systems:     Constitution: Negative.   HEENT: Negative.   Eyes: Negative.   Cardiovascular: Negative.   Respiratory: Negative.   Endocrine: Negative.   Hematologic/Lymphatic: Negative.   Skin: Negative.   Musculoskeletal: Positive for left ankle pain   Gastrointestinal: Negative.   Genitourinary: Negative.   Neurological: Negative.   Psychiatric/Behavioral: Negative.   Allergic/Immunologic: Negative.       PHYSICAL EXAM:  There were no vitals filed for this visit.  Ht Readings from Last 1 Encounters:   05/29/18 5' 3" (1.6 m)     Wt Readings from Last 1 Encounters:   05/29/18 83.1 kg (183 lb 3.2 oz)       GENERAL: Well developed, well nourished, no acute distress. Mild generalized ligamentous laxity.  SKIN: Skin is intact. No atrophy, abrasions or lesions are noted.   Neurological: Normal mental status. Appropriate and conversant. Alert and oriented x 3.  GAIT: Walks with non-antalgic gait.    Left  lower extremity compared with RLE:  2+ dorsalis pedis pulse.  Capillary refill < 3 seconds.  Normal range of motion tibiotalar and subtalar joints. Normal alignment of the forefoot and the hindfoot.  5/5 strength EHL, FHL, tibialis anterior, gastrocsoleus, tibialis posterior and peroneals. Sensation to light touch intact sural, saphenous, superficial peroneal and deep peroneal nerves. Mild swelling lateral ankle. No ecchymosis or deformity. No lymphadenopathy, no masses or tumors " palpated.   tenderness to palpation at ATFL. 2+ anterior drawer test. No pain with rom of the FHL. non-tender to palpation  posterior ankle.     XRAYS:   3 views of left ankle reviewed today reveal No evidence of fractures or dislocations. Os trigonum.    MRI of left ankle reviewed today reveal No evidence of fractures or dislocations. Chronic tear of ATFL. Mild inflammation posterior ankle.     ASSESSMENT:        Encounter Diagnosis   Name Primary?    Ankle instability, left Yes        PLAN:  I spent 25 minutes in consulation with the patient and her father today.  More than half the time was spent counseling the patient on her condition and the options for operative versus non-operative care.  I recommend left ankle arthroscopy and brostrom-jefferson lateral ligament reconstruction as she has failed conservative treatment. I have explained the procedure, including indications, risks, and alternatives.  Maureen Barrera gave informed consent and is medically ready for surgery. TO OR 7/11/18.  Will discuss scopolamine patch and IV zofran with anesthesia for the patient on the day of surgery.

## 2018-06-22 RX ORDER — SODIUM CHLORIDE 9 MG/ML
INJECTION, SOLUTION INTRAVENOUS CONTINUOUS
Status: CANCELLED | OUTPATIENT
Start: 2018-06-22

## 2018-06-27 ENCOUNTER — TELEPHONE (OUTPATIENT)
Dept: ORTHOPEDICS | Facility: CLINIC | Age: 23
End: 2018-06-27

## 2018-06-27 NOTE — TELEPHONE ENCOUNTER
"Spoke to patient. Patient questioning the use of Bactroban for pre op. Advised patient that this is Dr. Red's pre op protocol. Patient asked "is there an outbreak of MRSA?" Instructed patient there has not been an outbreak as far as I am aware however this is done prophylactic to prevent MRSA post operatively. Patient stated understanding.  "

## 2018-06-27 NOTE — TELEPHONE ENCOUNTER
----- Message from Kassie Prince sent at 6/27/2018 12:09 PM CDT -----  Contact: patient   Patient calling to speak to the Nurse regarding upcoming procedure. Please advise.   Call back    Thanks!

## 2018-06-27 NOTE — TELEPHONE ENCOUNTER
----- Message from Moi Rey sent at 6/27/2018  1:19 PM CDT -----  Contact: [facility] [address] [place] [address] [address] [facility]  [address] [facility] [address]  Phone: [phone] Fax: [phone]    Calling to get the Rx Mupirocin canceled and have it sent to the [facility]. I did advise Dr. Red not in today and will not get done until tomorrow. Thanks!

## 2018-06-28 ENCOUNTER — PATIENT MESSAGE (OUTPATIENT)
Dept: ORTHOPEDICS | Facility: CLINIC | Age: 23
End: 2018-06-28

## 2018-07-10 ENCOUNTER — TELEPHONE (OUTPATIENT)
Dept: ORTHOPEDICS | Facility: CLINIC | Age: 23
End: 2018-07-10

## 2018-07-10 ENCOUNTER — ANESTHESIA EVENT (OUTPATIENT)
Dept: SURGERY | Facility: HOSPITAL | Age: 23
End: 2018-07-10
Payer: COMMERCIAL

## 2018-07-10 ENCOUNTER — OFFICE VISIT (OUTPATIENT)
Dept: OBSTETRICS AND GYNECOLOGY | Facility: CLINIC | Age: 23
End: 2018-07-10
Payer: COMMERCIAL

## 2018-07-10 VITALS
BODY MASS INDEX: 31.95 KG/M2 | DIASTOLIC BLOOD PRESSURE: 84 MMHG | SYSTOLIC BLOOD PRESSURE: 124 MMHG | WEIGHT: 180.31 LBS

## 2018-07-10 DIAGNOSIS — Z12.4 ENCOUNTER FOR PAP SMEAR OF CERVIX WITH HPV DNA COTESTING: Primary | ICD-10-CM

## 2018-07-10 PROCEDURE — 99395 PREV VISIT EST AGE 18-39: CPT | Mod: S$GLB,,, | Performed by: OBSTETRICS & GYNECOLOGY

## 2018-07-10 PROCEDURE — 87624 HPV HI-RISK TYP POOLED RSLT: CPT

## 2018-07-10 PROCEDURE — 88175 CYTOPATH C/V AUTO FLUID REDO: CPT

## 2018-07-10 PROCEDURE — 99999 PR PBB SHADOW E&M-EST. PATIENT-LVL III: CPT | Mod: PBBFAC,,, | Performed by: OBSTETRICS & GYNECOLOGY

## 2018-07-10 RX ORDER — MUPIROCIN 20 MG/G
OINTMENT TOPICAL
Refills: 0 | COMMUNITY
Start: 2018-06-27 | End: 2018-11-08

## 2018-07-10 RX ORDER — NORGESTIMATE AND ETHINYL ESTRADIOL 7DAYSX3 LO
1 KIT ORAL DAILY
Qty: 28 TABLET | Refills: 12 | Status: SHIPPED | OUTPATIENT
Start: 2018-07-10 | End: 2019-06-16 | Stop reason: SDUPTHER

## 2018-07-10 NOTE — PROGRESS NOTES
Chief Complaint   Patient presents with    Follow-up     spotting in the beginning of the month-- follow up on OCP    papsmear       History and Physical:  Patient's last menstrual period was 06/15/2018.       Maureen Barrera is a 22 y.o.  female who presents today for her routine annual GYN exam. The patient has no Gynecology complaints today. No bowel or bladder complaints.       Allergies:   Review of patient's allergies indicates:   Allergen Reactions    Reglan [metoclopramide hcl]      Mother allergic       Past Medical History:   Diagnosis Date    Anxiety     Eczema     Fracture of left foot     GERD (gastroesophageal reflux disease)     Headache(784.0)     Lactose intolerance     Polyp of nasal cavity     Recurrent upper respiratory infection (URI)     Sprain of right ankle 10/26/11    Varicella     2nd grade       Past Surgical History:   Procedure Laterality Date    PLANTAR'S WART EXCISION  2015    POLYPECTOMY      Nasal    SPHENOIDECTOMY         MEDS:   Current Outpatient Prescriptions on File Prior to Visit   Medication Sig Dispense Refill    norgestimate-ethinyl estradiol (ORTHO TRI-CYCLEN LO) 0.18/0.215/0.25 mg-25 mcg tablet Take 1 tablet by mouth once daily. 28 tablet 3    ondansetron (ZOFRAN) 8 MG tablet Take 1 tablet (8 mg total) by mouth every 6 (six) hours as needed for Nausea. 20 tablet 1    citalopram (CELEXA) 10 MG tablet Take 1 tablet (10 mg total) by mouth once daily. 30 tablet 4     No current facility-administered medications on file prior to visit.        OB History      Para Term  AB Living    0 0 0 0 0 0    SAB TAB Ectopic Multiple Live Births    0 0 0 0 0          Social History     Social History    Marital status: Single     Spouse name: N/A    Number of children: N/A    Years of education: N/A     Occupational History          Social History Main Topics    Smoking status: Never Smoker    Smokeless tobacco: Never  Used    Alcohol use Yes      Comment: once a month    Drug use: No    Sexual activity: Not Currently     Other Topics Concern    Not on file     Social History Narrative    Born in [place].  Moved from [place] in 2011.       Family History   Problem Relation Age of Onset    Allergies Father     Eczema Father     Immunodeficiency Father     Allergies Paternal Aunt     Eczema Paternal Aunt     Allergies Paternal Uncle     Eczema Paternal Uncle     Immunodeficiency Maternal Grandmother     Allergies Paternal Grandmother     Angioedema Paternal Grandmother     Eczema Paternal Grandmother     Allergies Paternal Grandfather     Eczema Paternal Grandfather     Allergic rhinitis Neg Hx     Asthma Neg Hx     Atopy Neg Hx     Rhinitis Neg Hx     Urticaria Neg Hx          Past medical and surgical history reviewed.   I have reviewed the patient's medical history in detail and updated the computerized patient record.        Review of System:  General: no chills, fever, night sweats, weight gain or weight loss  Psychological: no depression or suicidal ideation  Breasts: no new or changing breast lumps, nipple discharge or masses.  Respiratory: no cough, shortness of breath, or wheezing  Cardiovascular: no chest pain or dyspnea on exertion  Gastrointestinal: no abdominal pain, change in bowel habits, or black or bloody stools  Genito-Urinary: no incontinence, urinary frequency/urgency or vulvar/vaginal symptoms, pelvic pain or abnormal vaginal bleeding.  Musculoskeletal: no gait disturbance or muscular weakness      Physical Exam:   /84   Wt 81.8 kg (180 lb 5.4 oz)   LMP 06/15/2018   BMI 31.95 kg/m²   Constitutional: She is oriented to person, place, and time. She appears well-developed and well-nourished. No distress.   HENT:   Head: Normocephalic and atraumatic.   Eyes: Conjunctivae and EOM are normal. No scleral icterus.   Neck: Normal range of motion. Neck supple. No tracheal deviation present.    Cardiovascular: Normal rate.    Pulmonary/Chest: Effort normal. No respiratory distress. She exhibits no tenderness.  Breasts: are symmetrical.   Right breast exhibits no inverted nipple, no mass, no nipple discharge, no skin change and no tenderness.   Left breast exhibits no inverted nipple, no mass, no nipple discharge, no skin change and no tenderness.  Abdominal: Soft. She exhibits no distension and no mass. There is no tenderness. There is no rebound and no guarding.   Genitourinary:    External rectal exam shows no thrombosed external hemorrhoids.    Pelvic exam was performed with patient supine.   No labial fusion.   There is no rash, lesion or injury on the right labia.   There is no rash, lesion or injury on the left labia.   No bleeding and no signs of injury around the vaginal introitus, urethra is without lesions and well supported. The cervix is visualized with no discharge, lesions or friability.   No vaginal discharge found.    No significant Cystocele, Enterocele or rectocele, and uterus well supported.   Bimanual exam:   The urethra is normal to palpation and there are no palpable vaginal wall masses.   Uterus is not deviated, not enlarged, not fixed, normal shape and not tender.   Cervix exhibits no motion tenderness.    Right adnexum displays no mass and no tenderness.   Left adnexum displays no mass and no tenderness.  Musculoskeletal: Normal range of motion.   Lymphadenopathy: No inguinal adenopathy present.   Neurological: She is alert and oriented to person, place, and time. Coordination normal.   Skin: Skin is warm and dry. She is not diaphoretic.   Psychiatric: She has a normal mood and affect.      Assessment:   Normal annual GYN exam  1. Encounter for Pap smear of cervix with HPV DNA cotesting  Liquid-based pap smear, screening    HPV High Risk Genotypes, PCR   menorrhagia, dysmenorrhea- doing well    Plan:   PAP  refil oral contraceptive pills   Follow up in 1 year.  Patient informed  will be contacted with results within 2 weeks. Encouraged to please call back or email if she has not heard from us by then.

## 2018-07-10 NOTE — TELEPHONE ENCOUNTER
Spoke to patient. Patient asked what time surgery will be. Advised patient that the surgery center will contact her between 3-6pm tonight to instruct her on what time to arrive. Patient asked if she would have to be NPO after midnight until whatever time her surgery is for. Instructed that yes she will need be NPO until she has surgery. Patient stated understanding.

## 2018-07-10 NOTE — TELEPHONE ENCOUNTER
----- Message from Ximena Cordero sent at 7/10/2018 12:18 PM CDT -----  Contact: pt  Pt is requesting a call back to speak with nurse regarding her time to arrive for her surgery tomorrow, Pt is currently at work, if no answer leave message, please call to advise  Call back    Thanks

## 2018-07-11 ENCOUNTER — SURGERY (OUTPATIENT)
Age: 23
End: 2018-07-11

## 2018-07-11 ENCOUNTER — ANESTHESIA (OUTPATIENT)
Dept: SURGERY | Facility: HOSPITAL | Age: 23
End: 2018-07-11
Payer: COMMERCIAL

## 2018-07-11 ENCOUNTER — HOSPITAL ENCOUNTER (OUTPATIENT)
Facility: HOSPITAL | Age: 23
Discharge: HOME | End: 2018-07-11
Attending: ORTHOPAEDIC SURGERY | Admitting: ORTHOPAEDIC SURGERY
Payer: COMMERCIAL

## 2018-07-11 VITALS
RESPIRATION RATE: 12 BRPM | OXYGEN SATURATION: 100 % | WEIGHT: 180.31 LBS | SYSTOLIC BLOOD PRESSURE: 121 MMHG | TEMPERATURE: 98 F | BODY MASS INDEX: 31.95 KG/M2 | HEART RATE: 79 BPM | HEIGHT: 63 IN | DIASTOLIC BLOOD PRESSURE: 70 MMHG

## 2018-07-11 DIAGNOSIS — M25.372 ANKLE INSTABILITY, LEFT: Primary | ICD-10-CM

## 2018-07-11 LAB
B-HCG UR QL: NEGATIVE
CTP QC/QA: YES

## 2018-07-11 PROCEDURE — 25000003 PHARM REV CODE 250: Mod: PO | Performed by: ANESTHESIOLOGY

## 2018-07-11 PROCEDURE — 27201423 OPTIME MED/SURG SUP & DEVICES STERILE SUPPLY: Mod: PO | Performed by: ORTHOPAEDIC SURGERY

## 2018-07-11 PROCEDURE — 25000003 PHARM REV CODE 250: Mod: PO | Performed by: NURSE ANESTHETIST, CERTIFIED REGISTERED

## 2018-07-11 PROCEDURE — 64450 NJX AA&/STRD OTHER PN/BRANCH: CPT | Mod: 59,LT,, | Performed by: ANESTHESIOLOGY

## 2018-07-11 PROCEDURE — D9220A PRA ANESTHESIA: Mod: ANES,,, | Performed by: ANESTHESIOLOGY

## 2018-07-11 PROCEDURE — 27698 REPAIR OF ANKLE LIGAMENT: CPT | Mod: LT,,, | Performed by: ORTHOPAEDIC SURGERY

## 2018-07-11 PROCEDURE — 36000709 HC OR TIME LEV III EA ADD 15 MIN: Mod: PO | Performed by: ORTHOPAEDIC SURGERY

## 2018-07-11 PROCEDURE — 71000039 HC RECOVERY, EACH ADD'L HOUR: Mod: PO | Performed by: ORTHOPAEDIC SURGERY

## 2018-07-11 PROCEDURE — 29897 ANKLE ARTHROSCOPY/SURGERY: CPT | Mod: 51,LT,, | Performed by: ORTHOPAEDIC SURGERY

## 2018-07-11 PROCEDURE — 81025 URINE PREGNANCY TEST: CPT | Mod: PO | Performed by: ANESTHESIOLOGY

## 2018-07-11 PROCEDURE — 71000033 HC RECOVERY, INTIAL HOUR: Mod: PO | Performed by: ORTHOPAEDIC SURGERY

## 2018-07-11 PROCEDURE — 63600175 PHARM REV CODE 636 W HCPCS: Mod: PO | Performed by: ANESTHESIOLOGY

## 2018-07-11 PROCEDURE — 36000708 HC OR TIME LEV III 1ST 15 MIN: Mod: PO | Performed by: ORTHOPAEDIC SURGERY

## 2018-07-11 PROCEDURE — 64445 NJX AA&/STRD SCIATIC NRV IMG: CPT | Mod: PO | Performed by: ANESTHESIOLOGY

## 2018-07-11 PROCEDURE — 37000009 HC ANESTHESIA EA ADD 15 MINS: Mod: PO | Performed by: ORTHOPAEDIC SURGERY

## 2018-07-11 PROCEDURE — 76942 ECHO GUIDE FOR BIOPSY: CPT | Mod: 26,,, | Performed by: ANESTHESIOLOGY

## 2018-07-11 PROCEDURE — D9220A PRA ANESTHESIA: Mod: CRNA,,, | Performed by: NURSE ANESTHETIST, CERTIFIED REGISTERED

## 2018-07-11 PROCEDURE — 27200750 HC INSULATED NEEDLE/ STIMUPLEX: Mod: PO | Performed by: ANESTHESIOLOGY

## 2018-07-11 PROCEDURE — 63600175 PHARM REV CODE 636 W HCPCS: Mod: PO | Performed by: NURSE ANESTHETIST, CERTIFIED REGISTERED

## 2018-07-11 PROCEDURE — 63600175 PHARM REV CODE 636 W HCPCS: Mod: PO | Performed by: ORTHOPAEDIC SURGERY

## 2018-07-11 PROCEDURE — 27200651 HC AIRWAY, LMA: Mod: PO | Performed by: NURSE ANESTHETIST, CERTIFIED REGISTERED

## 2018-07-11 PROCEDURE — 76942 ECHO GUIDE FOR BIOPSY: CPT | Mod: PO | Performed by: ANESTHESIOLOGY

## 2018-07-11 PROCEDURE — C1713 ANCHOR/SCREW BN/BN,TIS/BN: HCPCS | Mod: PO | Performed by: ORTHOPAEDIC SURGERY

## 2018-07-11 PROCEDURE — 37000008 HC ANESTHESIA 1ST 15 MINUTES: Mod: PO | Performed by: ORTHOPAEDIC SURGERY

## 2018-07-11 RX ORDER — ONDANSETRON 8 MG/1
8 TABLET, ORALLY DISINTEGRATING ORAL ONCE
Status: COMPLETED | OUTPATIENT
Start: 2018-07-11 | End: 2018-07-11

## 2018-07-11 RX ORDER — ONDANSETRON 4 MG/1
8 TABLET, ORALLY DISINTEGRATING ORAL EVERY 8 HOURS PRN
Qty: 42 TABLET | Refills: 1 | Status: SHIPPED | OUTPATIENT
Start: 2018-07-11 | End: 2019-09-13

## 2018-07-11 RX ORDER — ONDANSETRON 2 MG/ML
INJECTION INTRAMUSCULAR; INTRAVENOUS
Status: DISCONTINUED | OUTPATIENT
Start: 2018-07-11 | End: 2018-07-11

## 2018-07-11 RX ORDER — MIDAZOLAM HYDROCHLORIDE 1 MG/ML
2 INJECTION INTRAMUSCULAR; INTRAVENOUS ONCE
Status: COMPLETED | OUTPATIENT
Start: 2018-07-11 | End: 2018-07-11

## 2018-07-11 RX ORDER — SODIUM CHLORIDE, SODIUM LACTATE, POTASSIUM CHLORIDE, CALCIUM CHLORIDE 600; 310; 30; 20 MG/100ML; MG/100ML; MG/100ML; MG/100ML
INJECTION, SOLUTION INTRAVENOUS CONTINUOUS
Status: DISCONTINUED | OUTPATIENT
Start: 2018-07-11 | End: 2018-07-11 | Stop reason: HOSPADM

## 2018-07-11 RX ORDER — FENTANYL CITRATE 50 UG/ML
25 INJECTION, SOLUTION INTRAMUSCULAR; INTRAVENOUS EVERY 5 MIN PRN
Status: DISCONTINUED | OUTPATIENT
Start: 2018-07-11 | End: 2018-07-11 | Stop reason: HOSPADM

## 2018-07-11 RX ORDER — FENTANYL CITRATE 50 UG/ML
INJECTION, SOLUTION INTRAMUSCULAR; INTRAVENOUS
Status: DISCONTINUED | OUTPATIENT
Start: 2018-07-11 | End: 2018-07-11

## 2018-07-11 RX ORDER — LIDOCAINE HCL/PF 100 MG/5ML
SYRINGE (ML) INTRAVENOUS
Status: DISCONTINUED | OUTPATIENT
Start: 2018-07-11 | End: 2018-07-11

## 2018-07-11 RX ORDER — MEPERIDINE HYDROCHLORIDE 50 MG/ML
12.5 INJECTION INTRAMUSCULAR; INTRAVENOUS; SUBCUTANEOUS EVERY 10 MIN PRN
Status: DISCONTINUED | OUTPATIENT
Start: 2018-07-11 | End: 2018-07-11 | Stop reason: HOSPADM

## 2018-07-11 RX ORDER — PROPOFOL 10 MG/ML
VIAL (ML) INTRAVENOUS
Status: DISCONTINUED | OUTPATIENT
Start: 2018-07-11 | End: 2018-07-11

## 2018-07-11 RX ORDER — FENTANYL CITRATE 50 UG/ML
100 INJECTION, SOLUTION INTRAMUSCULAR; INTRAVENOUS ONCE
Status: COMPLETED | OUTPATIENT
Start: 2018-07-11 | End: 2018-07-11

## 2018-07-11 RX ORDER — HYDROCODONE BITARTRATE AND ACETAMINOPHEN 10; 325 MG/1; MG/1
1 TABLET ORAL EVERY 4 HOURS PRN
Qty: 42 TABLET | Refills: 0 | Status: SHIPPED | OUTPATIENT
Start: 2018-07-11 | End: 2018-11-08

## 2018-07-11 RX ORDER — LIDOCAINE HYDROCHLORIDE 10 MG/ML
1 INJECTION, SOLUTION EPIDURAL; INFILTRATION; INTRACAUDAL; PERINEURAL ONCE
Status: DISCONTINUED | OUTPATIENT
Start: 2018-07-11 | End: 2018-07-11 | Stop reason: HOSPADM

## 2018-07-11 RX ORDER — ACETAMINOPHEN 10 MG/ML
INJECTION, SOLUTION INTRAVENOUS
Status: DISCONTINUED | OUTPATIENT
Start: 2018-07-11 | End: 2018-07-11

## 2018-07-11 RX ORDER — DEXAMETHASONE SODIUM PHOSPHATE 4 MG/ML
INJECTION, SOLUTION INTRA-ARTICULAR; INTRALESIONAL; INTRAMUSCULAR; INTRAVENOUS; SOFT TISSUE
Status: DISCONTINUED | OUTPATIENT
Start: 2018-07-11 | End: 2018-07-11

## 2018-07-11 RX ORDER — MIDAZOLAM HYDROCHLORIDE 1 MG/ML
INJECTION, SOLUTION INTRAMUSCULAR; INTRAVENOUS
Status: DISCONTINUED | OUTPATIENT
Start: 2018-07-11 | End: 2018-07-11

## 2018-07-11 RX ORDER — KETAMINE HYDROCHLORIDE 100 MG/ML
INJECTION, SOLUTION INTRAMUSCULAR; INTRAVENOUS
Status: DISCONTINUED | OUTPATIENT
Start: 2018-07-11 | End: 2018-07-11

## 2018-07-11 RX ORDER — OXYCODONE HYDROCHLORIDE 5 MG/1
5 TABLET ORAL ONCE AS NEEDED
Status: COMPLETED | OUTPATIENT
Start: 2018-07-11 | End: 2018-07-11

## 2018-07-11 RX ORDER — SODIUM CHLORIDE, SODIUM LACTATE, POTASSIUM CHLORIDE, CALCIUM CHLORIDE 600; 310; 30; 20 MG/100ML; MG/100ML; MG/100ML; MG/100ML
125 INJECTION, SOLUTION INTRAVENOUS CONTINUOUS
Status: DISCONTINUED | OUTPATIENT
Start: 2018-07-11 | End: 2018-07-11 | Stop reason: HOSPADM

## 2018-07-11 RX ORDER — SODIUM CHLORIDE 9 MG/ML
INJECTION, SOLUTION INTRAVENOUS CONTINUOUS
Status: DISCONTINUED | OUTPATIENT
Start: 2018-07-11 | End: 2018-07-11

## 2018-07-11 RX ORDER — CEFAZOLIN SODIUM 1 G/3ML
2 INJECTION, POWDER, FOR SOLUTION INTRAMUSCULAR; INTRAVENOUS
Status: COMPLETED | OUTPATIENT
Start: 2018-07-11 | End: 2018-07-11

## 2018-07-11 RX ORDER — SCOLOPAMINE TRANSDERMAL SYSTEM 1 MG/1
PATCH, EXTENDED RELEASE TRANSDERMAL
Status: DISCONTINUED | OUTPATIENT
Start: 2018-07-11 | End: 2018-07-11

## 2018-07-11 RX ADMIN — PROPOFOL 150 MG: 10 INJECTION, EMULSION INTRAVENOUS at 07:07

## 2018-07-11 RX ADMIN — CEFAZOLIN 2 G: 1 INJECTION, POWDER, FOR SOLUTION INTRAVENOUS at 07:07

## 2018-07-11 RX ADMIN — KETAMINE HYDROCHLORIDE 25 MG: 100 INJECTION, SOLUTION, CONCENTRATE INTRAMUSCULAR; INTRAVENOUS at 07:07

## 2018-07-11 RX ADMIN — FENTANYL CITRATE 50 MCG: 50 INJECTION, SOLUTION INTRAMUSCULAR; INTRAVENOUS at 07:07

## 2018-07-11 RX ADMIN — ONDANSETRON 8 MG: 8 TABLET, ORALLY DISINTEGRATING ORAL at 09:07

## 2018-07-11 RX ADMIN — LIDOCAINE HYDROCHLORIDE 100 MG: 20 INJECTION PARENTERAL at 07:07

## 2018-07-11 RX ADMIN — DEXAMETHASONE SODIUM PHOSPHATE 12 MG: 4 INJECTION, SOLUTION INTRAMUSCULAR; INTRAVENOUS at 07:07

## 2018-07-11 RX ADMIN — MIDAZOLAM HYDROCHLORIDE 2 MG: 1 INJECTION, SOLUTION INTRAMUSCULAR; INTRAVENOUS at 06:07

## 2018-07-11 RX ADMIN — ONDANSETRON 4 MG: 2 INJECTION, SOLUTION INTRAMUSCULAR; INTRAVENOUS at 06:07

## 2018-07-11 RX ADMIN — OXYCODONE HYDROCHLORIDE 5 MG: 5 TABLET ORAL at 08:07

## 2018-07-11 RX ADMIN — SCOPALAMINE 1 PATCH: 1 PATCH, EXTENDED RELEASE TRANSDERMAL at 06:07

## 2018-07-11 RX ADMIN — SODIUM CHLORIDE, SODIUM LACTATE, POTASSIUM CHLORIDE, AND CALCIUM CHLORIDE: .6; .31; .03; .02 INJECTION, SOLUTION INTRAVENOUS at 06:07

## 2018-07-11 RX ADMIN — ACETAMINOPHEN 1000 MG: 10 INJECTION, SOLUTION INTRAVENOUS at 07:07

## 2018-07-11 RX ADMIN — FENTANYL CITRATE 100 MCG: 50 INJECTION INTRAMUSCULAR; INTRAVENOUS at 06:07

## 2018-07-11 NOTE — BRIEF OP NOTE
OPERATIVE NOTE     DATE: 7/11/2018   TIME:7:59 AM     PATIENT NAME: Maureen Barrera    PRE-OPERATIVE DIAGNOSIS: :Left lateral ankle instability     POST-OPERATIVE DIAGNOSIS: Left lateral ankle instability     PROCEDURE: 1. Left ankle arthroscopy with limited debridement 2. Left Ankle Brostrom-Mansfield lateral ligament reconstruction     SURGEON: Jimmie Red MD     ANESTHESIA TYPE: LMA    SPECIMENS SENT: NONE     COMPLICATIONS: NONE     BLOOD LOSS: <5cc     ASSISTANT: HANK Walker DPM

## 2018-07-11 NOTE — ANESTHESIA PROCEDURE NOTES
Peripheral    Patient location during procedure: pre-op   Block not for primary anesthetic.  Reason for block: at surgeon's request and post-op pain management   Post-op Pain Location: left ankle   Start time: 7/11/2018 6:40 AM  Timeout: 7/11/2018 6:40 AM   End time: 7/11/2018 6:50 AM  Preanesthetic Checklist  Completed: patient identified, site marked, surgical consent, pre-op evaluation, timeout performed, IV checked, risks and benefits discussed and monitors and equipment checked  Peripheral Block  Patient position: supine  Prep: ChloraPrep  Patient monitoring: heart rate, cardiac monitor, continuous pulse ox, continuous capnometry and frequent blood pressure checks  Block type: popliteal  Laterality: left  Injection technique: single shot  Needle  Needle type: Stimuplex   Needle gauge: 21 G  Needle length: 4 in  Needle localization: anatomical landmarks and ultrasound guidance   -ultrasound image captured on disc.  Assessment  Injection assessment: negative aspiration, negative parasthesia and local visualized surrounding nerve  Paresthesia pain: none  Heart rate change: no  Slow fractionated injection: yes  Medications:  Bolus administered: 15 mL of 0.5 bupivacaine  Epinephrine added: none  Additional Notes  + 15cc 1.3% Exparel.  VSS.  [facility] RN monitoring vitals throughout procedure.  Patient tolerated procedure well.

## 2018-07-11 NOTE — ANESTHESIA POSTPROCEDURE EVALUATION
"Anesthesia Post Evaluation    Patient: Maureen Barrera    Procedure(s) Performed: Procedure(s) (LRB):  ARTHROSCOPY, ANKLE, WITH DEBRIDEMENT (Left)  REPAIR,LIGAMENT (Left)    Final Anesthesia Type: general  Patient location during evaluation: PACU  Patient participation: Yes- Able to Participate  Level of consciousness: awake and alert  Post-procedure vital signs: reviewed and stable  Pain management: adequate  Airway patency: patent  PONV status at discharge: No PONV  Anesthetic complications: no      Cardiovascular status: hemodynamically stable  Respiratory status: unassisted and room air  Hydration status: euvolemic  Follow-up not needed.        Visit Vitals  /70 (BP Location: Right arm, Patient Position: Sitting)   Pulse 79   Temp 36.4 °C (97.5 °F) (Skin)   Resp 12   Ht 5' 3" (1.6 m)   Wt 81.8 kg (180 lb 5.4 oz)   LMP 06/15/2018   SpO2 100%   Breastfeeding? No   BMI 31.95 kg/m²       Pain/Reinier Score: Pain Assessment Performed: Yes (7/11/2018  8:09 AM)  Presence of Pain: non-verbal indicators absent (7/11/2018  8:09 AM)  Pain Rating Prior to Med Admin: 5 (7/11/2018  8:56 AM)  Reinier Score: 10 (7/11/2018  8:40 AM)      "

## 2018-07-11 NOTE — OP NOTE
OPERATIVE NOTE     DATE: 7/11/2018   TIME:7:59 AM     PATIENT NAME: Maureen Barrera    PRE-OPERATIVE DIAGNOSIS: :Left lateral ankle instability     POST-OPERATIVE DIAGNOSIS: Left lateral ankle instability     PROCEDURE: 1. Left ankle arthroscopy with limited debridement 2. Left Ankle Brostrom-Mansfield lateral ligament reconstruction     SURGEON: Jimmie Red MD     ANESTHESIA TYPE: LMA    SPECIMENS SENT: NONE     COMPLICATIONS: NONE     BLOOD LOSS: <5cc     ASSISTANT: HANK Walker DPM         Procedure in detail: After appropriate informed consent was obtained. The patient was placed in the supine position on the operating table. The Left lower extremity was placed in the well leg aparicio with the knee at 90 degrees. The Left lower extremity was then prepped and draped in the usual sterile fashion. Tourniquet was raised to 300 mmHg after esmarch exsanguination of the limb. The foot was placed in the ankle distractor and the ankle was distracted. The anteromedial portal for ankle arthroscopy was established using an 18 gauge spinal needle, injecting and aspirating back 10 cc of normal saline. A very small incision was made using a #11 blade just medial to the tibialis anterior tendon in line with the spinal needle insertion site through the skin only. A small hemostat was introduced and using to spread down to the capsule. The blunt trocar was placed into the joint. The camera was inserted and the ankle joint was inspected medially and laterally. There was good visualization of the joint. There was some mild synovitis.   The anterolateral portal was established using 18 gauge spinal needle which was inserted into the lateral joint space while visualized using the camera. A very small incision as made using a #11 blade in line with the spinal needle insertion site through the skin only. The [employer] 2 mm shaver was placed and used to perform limited debridement of synovitis within the  ankle. No osteochondral defects were found.  No loose bodies were found. The articular cartilage appeared healthy.   The camera was switched to the lateral portal where the joint was again inspected. Last look was made through the medial and lateral gutters and the posterior ankle and then the trocar and the cameras were removed.   The patient's leg was taken out of the well leg aparicio.      Next, a 5 cm curvilinear incision was made using a #15 blade overlying the distal fibula.  Attention was then turned to the anterior portion of the incision where the proximal edge of the inferior extensor retinaculum was then identified, carefully dissected and mobilized. The lateral ankle capsule was then identified along with the attenuated anterior talofibular ligament. The ligament was quite thin. The calcaneofibular ligament was identified at the tip of the distal fibula with inferior retraction of the peroneal tendons. The capsule was then divided from the distal fibula and extended about one centimeter proximally via sub periosteal elevation with a #15 blade.   The lateral ankle gutter and the lateral talar dome were also inspected for loose bodies and osteochondral injuries. Next, the distal tip of the fibula at the ATFL and CFL footprint was debrided with a curette and surgical blade, then a rongeur was used to lightly decorticate the bone in order to provide a bleeding bony bed for healing and suture anchor placement. Using the drill for the Jayson 1.4 mm mini Juggernaut anchors two drill holes were then made at the ATFL and CFL insertion site on the distal fibula.  Next a drill hole was placed in the distal fibula for the 2.9 mm anchor for the internal brace. The subtalar joint was identified and the neck of the talus was dissected out using litler scissors.  The drill for the Jayson 2.9 mm anchor was angled proximally and inferiorly and used to drill the lateral neck of the talus for the 2.9 mm internal brace.  Two  1.4 mm Mini Juggernaut anchors were placed into the holes. The distal suture was placed into the CFL in a horizontal mattress fashion. The proximal suturewas placed into the ATFL ligament using a horizontal mattress and these were both tied down with the foot in dorsiflexion and eversion.     The anchor for the internal brace was then placed into the lateral talar neck and then the fiber tape was passed deep to the retinaculum using a hemostat and then the fiber tape was threaded into the 2.9 mm anchor and placed into the drill site on the distal fibula with  a hemostat was placed under the suture to prevent overtightening.  The extensor retinaculum was then advanced and repaired to the periosteum of the distal fibula to reinforce the repair using interrupted 2.0 ethibond sutures. This is also done with the foot in neutral dorsiflexion and eversion.   The incision was irrigated with normal saline. Tourniquet was let down. Adequate hemostasis was achieved using bovie cautery. The deep layer of the incision was re-approximated using 0-monocyrl in an interrupted fashion. The subcutaneous layer was re-approximated using 3.0-monocyrl in an interrupted fashion. The skin was re-approximated using 3.0 nylon in a running fashion. Sterile dressing using xeroform, bacitracin, 4x8s, cast padding, and posterior splint and stirrups with the foot in neutral and eversion were placed. Patient tolerated the procedure well without complications. A well-padded posterior splint and stirrups with the foot in neutral and eversion was placed. I was present and scrubbed for the entire case.

## 2018-07-11 NOTE — DISCHARGE SUMMARY
"[facility]  Discharge Note  Short Stay    Admit Date: 7/11/2018    Discharge Date and Time: 7/11/2018 8:01 AM     Attending Physician: Jimmie Red MD     Discharge Provider: Jimmie Red    Diagnoses:  Active Hospital Problems    Diagnosis  POA    *Ankle instability, left [M25.372]  Yes      Resolved Hospital Problems    Diagnosis Date Resolved POA   No resolved problems to display.       Discharged Condition: good    Hospital Course: Patient was admitted for an outpatient procedure and tolerated the procedure well with no complications.    Final Diagnoses: Same as principal problem.    Disposition: Home or Self Care    Follow up/Patient Instructions:    Medications:  Reconciled Home Medications:      Medication List      START taking these medications    HYDROcodone-acetaminophen  mg per tablet  Commonly known as:  NORCO  Take 1 tablet by mouth every 4 (four) hours as needed for Pain.     ondansetron 4 MG Tbdl  Commonly known as:  ZOFRAN-ODT  Take 2 tablets (8 mg total) by mouth every 8 (eight) hours as needed.        CONTINUE taking these medications    mupirocin 2 % ointment  Commonly known as:  BACTROBAN  SHERYL AA BID     norgestimate-ethinyl estradiol 0.18/0.215/0.25 mg-25 mcg tablet  Commonly known as:  ORTHO TRI-CYCLEN LO  Take 1 tablet by mouth once daily.     ondansetron 8 MG tablet  Commonly known as:  ZOFRAN  Take 1 tablet (8 mg total) by mouth every 6 (six) hours as needed for Nausea.            Discharge Procedure Orders  CRUTCHES FOR HOME USE   Order Specific Question Answer Comments   Type: Axillary    Height: 5' 3" (1.6 m)    Weight: 81.8 kg (180 lb 5.4 oz)    Length of need (1-99 months): 2      Diet general     Call MD for:  temperature >100.4     Call MD for:  persistent nausea and vomiting     Call MD for:  severe uncontrolled pain     Call MD for:  difficulty breathing, headache or visual disturbances     Call MD for:  redness, tenderness, or signs of infection (pain, " "swelling, redness, odor or green/yellow discharge around incision site)     Call MD for:  hives     Call MD for:  persistent dizziness or light-headedness     Call MD for:  extreme fatigue     Keep surgical extremity elevated     No driving, operating heavy equipment or signing legal documents while taking pain medication     Leave dressing on - Keep it clean, dry, and intact until clinic visit     Non weight bearing       Follow-up Information     Jimmie Red MD In 2 weeks.    Specialty:  Orthopedic Surgery  Contact information:  [address] [place] [address]  [phone]                   Discharge Procedure Orders (must include Diet, Follow-up, Activity):    Discharge Procedure Orders (must include Diet, Follow-up, Activity)  CRUTCHES FOR HOME USE   Order Specific Question Answer Comments   Type: Axillary    Height: 5' 3" (1.6 m)    Weight: 81.8 kg (180 lb 5.4 oz)    Length of need (1-99 months): 2      Diet general     Call MD for:  temperature >100.4     Call MD for:  persistent nausea and vomiting     Call MD for:  severe uncontrolled pain     Call MD for:  difficulty breathing, headache or visual disturbances     Call MD for:  redness, tenderness, or signs of infection (pain, swelling, redness, odor or green/yellow discharge around incision site)     Call MD for:  hives     Call MD for:  persistent dizziness or light-headedness     Call MD for:  extreme fatigue     Keep surgical extremity elevated     No driving, operating heavy equipment or signing legal documents while taking pain medication     Leave dressing on - Keep it clean, dry, and intact until clinic visit     Non weight bearing        "

## 2018-07-11 NOTE — TRANSFER OF CARE
"Anesthesia Transfer of Care Note    Patient: Maureen Barrera    Procedure(s) Performed: Procedure(s) (LRB):  ARTHROSCOPY, ANKLE, WITH DEBRIDEMENT (Left)  REPAIR,LIGAMENT (Left)    Patient location: PACU    Anesthesia Type: general    Transport from OR: Transported from OR on room air with adequate spontaneous ventilation    Post pain: adequate analgesia    Post assessment: no apparent anesthetic complications and tolerated procedure well    Post vital signs: stable    Level of consciousness: awake and sedated    Nausea/Vomiting: no nausea/vomiting    Complications: none    Transfer of care protocol was followed      Last vitals:   Visit Vitals  /66 (BP Location: Right arm, Patient Position: Lying)   Pulse 95   Temp 36.4 °C (97.5 °F) (Skin)   Resp 18   Ht 5' 3" (1.6 m)   Wt 81.8 kg (180 lb 5.4 oz)   LMP 06/15/2018   SpO2 100%   Breastfeeding? No   BMI 31.95 kg/m²     "

## 2018-07-11 NOTE — ANESTHESIA PREPROCEDURE EVALUATION
07/11/2018  Maureen Barrera is a 22 y.o., female.    Anesthesia Evaluation    I have reviewed the Patient Summary Reports.    I have reviewed the Nursing Notes.   I have reviewed the Medications.     Review of Systems  Anesthesia Hx:  Hx of Anesthetic complications PONV   Social:  Non-Smoker    Hematology/Oncology:  Hematology Normal   Oncology Normal     EENT/Dental:EENT/Dental Normal   Cardiovascular:  Cardiovascular Normal     Pulmonary:   Recent URI    Renal/:  Renal/ Normal     Hepatic/GI:   GERD    Musculoskeletal:   Ankle instability    Neurological:   Headaches    Endocrine:  Endocrine Normal    Dermatological:  Skin Normal    Psych:  Psychiatric Normal           Physical Exam  General:  Obesity    Airway/Jaw/Neck:  Airway Findings: Mouth Opening: Normal Tongue: Normal  General Airway Assessment: Adult  Mallampati: I        Eyes/Ears/Nose:  EYES/EARS/NOSE FINDINGS: Normal   Dental:  DENTAL FINDINGS: Normal   Chest/Lungs:  Chest/Lungs Findings: Clear to auscultation, Normal Respiratory Rate     Heart/Vascular:  Heart Findings: Rate: Normal  Rhythm: Regular Rhythm  Sounds: Normal        Mental Status:  Mental Status Findings:  Cooperative, Alert and Oriented         Anesthesia Plan  Type of Anesthesia, risks & benefits discussed:  Anesthesia Type:  general  Patient's Preference:   Intra-op Monitoring Plan: standard ASA monitors  Intra-op Monitoring Plan Comments:   Post Op Pain Control Plan: multimodal analgesia, peripheral nerve block and IV/PO Opioids PRN  Post Op Pain Control Plan Comments:   Induction:   IV  Beta Blocker:  Patient is not currently on a Beta-Blocker (No further documentation required).       Informed Consent: Patient understands risks and agrees with Anesthesia plan.  Questions answered. Anesthesia consent signed with patient.  ASA Score: 2     Day of Surgery Review of  History & Physical: I have interviewed and examined the patient. I have reviewed the patient's H&P dated:  There are no significant changes.  H&P update referred to the surgeon.         Ready For Surgery From Anesthesia Perspective.

## 2018-07-11 NOTE — DISCHARGE INSTRUCTIONS
Discharge Instructions: After Your Surgery  Youve just had surgery. During surgery, you were given medicine called anesthesia to keep you relaxed and free of pain. After surgery, you may have some pain or nausea. This is common. Here are some tips for feeling better and getting well after surgery.     Stay on schedule with your medicine.   Going home  Your healthcare provider will show you how to take care of yourself when you go home. He or she will also answer your questions. Have an adult family member or friend drive you home. For the first 24 hours after your surgery:  · Do not drive or use heavy equipment.  · Do not make important decisions or sign legal papers.  · Do not drink alcohol.  · Have someone stay with you, if needed. He or she can watch for problems and help keep you safe.  Be sure to go to all follow-up visits with your healthcare provider. And rest after your surgery for as long as your healthcare provider tells you to.  Coping with pain  If you have pain after surgery, pain medicine will help you feel better. Take it as told, before pain becomes severe. Also, ask your healthcare provider or pharmacist about other ways to control pain. This might be with heat, ice, or relaxation. And follow any other instructions your surgeon or nurse gives you.  Tips for taking pain medicine  To get the best relief possible, remember these points:  · Pain medicines can upset your stomach. Taking them with a little food may help.  · Most pain relievers taken by mouth need at least 20 to 30 minutes to start to work.  · Taking medicine on a schedule can help you remember to take it. Try to time your medicine so that you can take it before starting an activity. This might be before you get dressed, go for a walk, or sit down for dinner.  · Constipation is a common side effect of pain medicines. Call your healthcare provider before taking any medicines such as laxatives or stool softeners to help ease constipation.  Also ask if you should skip any foods. Drinking lots of fluids and eating foods such as fruits and vegetables that are high in fiber can also help. Remember, do not take laxatives unless your surgeon has prescribed them.  · Drinking alcohol and taking pain medicine can cause dizziness and slow your breathing. It can even be deadly. Do not drink alcohol while taking pain medicine.  · Pain medicine can make you react more slowly to things. Do not drive or run machinery while taking pain medicine.  Your healthcare provider may tell you to take acetaminophen to help ease your pain. Ask him or her how much you are supposed to take each day. Acetaminophen or other pain relievers may interact with your prescription medicines or other over-the-counter (OTC) medicines. Some prescription medicines have acetaminophen and other ingredients. Using both prescription and OTC acetaminophen for pain can cause you to overdose. Read the labels on your OTC medicines with care. This will help you to clearly know the list of ingredients, how much to take, and any warnings. It may also help you not take too much acetaminophen. If you have questions or do not understand the information, ask your pharmacist or healthcare provider to explain it to you before you take the OTC medicine.  Managing nausea  Some people have an upset stomach after surgery. This is often because of anesthesia, pain, or pain medicine, or the stress of surgery. These tips will help you handle nausea and eat healthy foods as you get better. If you were on a special food plan before surgery, ask your healthcare provider if you should follow it while you get better. These tips may help:  · Do not push yourself to eat. Your body will tell you when to eat and how much.  · Start off with clear liquids and soup. They are easier to digest.  · Next try semi-solid foods, such as mashed potatoes, applesauce, and gelatin, as you feel ready.  · Slowly move to solid foods. Dont  eat fatty, rich, or spicy foods at first.  · Do not force yourself to have 3 large meals a day. Instead eat smaller amounts more often.  · Take pain medicines with a small amount of solid food, such as crackers or toast, to avoid nausea.     Call your surgeon if  · You still have pain an hour after taking medicine. The medicine may not be strong enough.  · You feel too sleepy, dizzy, or groggy. The medicine may be too strong.  · You have side effects like nausea, vomiting, or skin changes, such as rash, itching, or hives.       If you have obstructive sleep apnea  You were given anesthesia medicine during surgery to keep you comfortable and free of pain. After surgery, you may have more apnea spells because of this medicine and other medicines you were given. The spells may last longer than usual.   At home:  · Keep using the continuous positive airway pressure (CPAP) device when you sleep. Unless your healthcare provider tells you not to, use it when you sleep, day or night. CPAP is a common device used to treat obstructive sleep apnea.  · Talk with your provider before taking any pain medicine, muscle relaxants, or sedatives. Your provider will tell you about the possible dangers of taking these medicines.  Date Last Reviewed: 12/1/2016 © 2000-2017 The "[employer]. [address] [place] [address]. All rights reserved. This information is not intended as a substitute for professional medical care. Always follow your healthcare professional's instructions.        FOOT SURGERY  After surgery:    DOS:   Keep leg elevated until first post operative visit   Keep dressing clean and dry DO NOT CHANGE BANDAGES   Advance diet as tolerated.    Check circulation frequently in toes by pressing down on toenail. Nail should turn white and then pink WITHIN 3 SECONDS when released.   Resume home medications    DONT:   Do not remove your dressing   Do not get dressing wet.   No driving until released  by MD   DO NOT TAKE ADDITIONAL TYLENOL/ACETAMINOPHEN WHILE TAKING NARCOTIC PAIN MEDICATION THAT CONTAINS TYLENOL/ACETAMINOPHEN.    CALL PHYSICIAN FOR:   Burning, or numbness of the toes not relieved by elevation of the leg.   Pale or cold toes; bluish nail beds.   Redness, swelling, or bleeding.   Fever> 101   Drainage (pus) from the operative sites   Pain unrelieved by pain medication    FOR EMERGENCIES CONTACT YOUR PHYSICIAN'S OFFICE    Exparel(bupivacaine) has been injected to provide approximately 96 hours of reduced pain after your surgery.  Do not remove the bracelet for five days  Report to your doctor as soon as possible the following:   Restlessness   Anxiety   Speech problems,    Lightheadedness   Numbness and tingling of the mouth and lips   Seizures    Metallic taste   Blurred vision   Tremors    Twitching   Depression   Extreme drowsiness  Avoid procedures requiring local anesthetics (such as dental procedures) for five days

## 2018-07-11 NOTE — H&P
"HPI: Maureen Barrera is a  22 y.o. female who was referred to me by Dr. Reid and is here for f/u today for left ankle pain. 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 She says the ankle gives out about once a day. She works as a . She rates her pain as 0/10 a rest. The pain is worse with walking and standing.      PAST MEDICAL/SURGICAL/FAMILY/SOCIAL/ HISTORY: REVIEWED    ALLERGIES/MEDICATIONS: REVIEWED         Review of Systems:     Constitution: Negative.   HEENT: Negative.   Eyes: Negative.   Cardiovascular: Negative.   Respiratory: Negative.   Endocrine: Negative.   Hematologic/Lymphatic: Negative.   Skin: Negative.   Musculoskeletal: Positive for left ankle pain   Gastrointestinal: Negative.   Genitourinary: Negative.   Neurological: Negative.   Psychiatric/Behavioral: Negative.   Allergic/Immunologic: Negative.         PHYSICAL EXAM:  There were no vitals filed for this visit.      Ht Readings from Last 1 Encounters:   05/29/18 5' 3" (1.6 m)          Wt Readings from Last 1 Encounters:   05/29/18 83.1 kg (183 lb 3.2 oz)        GENERAL: Well developed, well nourished, no acute distress. Mild generalized ligamentous laxity.  SKIN: Skin is intact. No atrophy, abrasions or lesions are noted.   Neurological: Normal mental status. Appropriate and conversant. Alert and oriented x 3.  GAIT: Walks with non-antalgic gait.     Left  lower extremity compared with RLE:  2+ dorsalis pedis pulse.  Capillary refill < 3 seconds.  Normal range of motion tibiotalar and subtalar joints. Normal alignment of the forefoot and the hindfoot.  5/5 strength EHL, FHL, tibialis anterior, gastrocsoleus, tibialis posterior and peroneals. Sensation to light touch intact sural, saphenous, superficial peroneal and deep peroneal nerves. Mild swelling lateral ankle. No ecchymosis or deformity. No lymphadenopathy, no masses " or tumors palpated.   tenderness to palpation at ATFL. 2+ anterior drawer test. No pain with rom of the FHL. non-tender to palpation  posterior ankle.      XRAYS:   3 views of left ankle reviewed today reveal No evidence of fractures or dislocations. Os trigonum.     MRI of left ankle reviewed today reveal No evidence of fractures or dislocations. Chronic tear of ATFL. Mild inflammation posterior ankle.      ASSESSMENT:           Encounter Diagnosis   Name Primary?    Ankle instability, left Yes        PLAN:  I spent 25 minutes in consulation with the patient and her father today.  More than half the time was spent counseling the patient on her condition and the options for operative versus non-operative care.  I recommend left ankle arthroscopy and brostrom-jefferson lateral ligament reconstruction as she has failed conservative treatment. I have explained the procedure, including indications, risks, and alternatives.  Maureen Barrera gave informed consent and is medically ready for surgery. TO OR 7/11/18.  Will discuss scopolamine patch and IV zofran with anesthesia for the patient on the day of surgery.

## 2018-07-13 ENCOUNTER — TELEPHONE (OUTPATIENT)
Dept: ORTHOPEDICS | Facility: CLINIC | Age: 23
End: 2018-07-13

## 2018-07-13 LAB
HPV HR 12 DNA CVX QL NAA+PROBE: NEGATIVE
HPV16 AG SPEC QL: NEGATIVE
HPV18 DNA SPEC QL NAA+PROBE: NEGATIVE

## 2018-07-13 NOTE — TELEPHONE ENCOUNTER
Called patient to let her know she needs to elevate her foot and take her pain medication to help out with the uncomfortable feeling she is feeling and by no means do not take the dressing off, Patient states she is not taking any pain meds because it doesn't hurt. I instructed the patient per Dr. Red request to place a towel next to her leg by her knee where it is uncomfortable to put some space between her leg and the cast. I also instructed the patient to use a pillow under her heal and not her calf when elevating to help relieve some stress off the uncomfortable spot by her knee. Patient verbalizes understanding

## 2018-07-13 NOTE — TELEPHONE ENCOUNTER
----- Message from Isabel Felder sent at 7/13/2018  8:11 AM CDT -----  Contact: Self  Patient had surgery this week. Said that cast is too tight and very uncomfortable. She's keeping leg elevated above heart, but it;s painful.  Please call.

## 2018-07-15 ENCOUNTER — PATIENT MESSAGE (OUTPATIENT)
Dept: ORTHOPEDICS | Facility: CLINIC | Age: 23
End: 2018-07-15

## 2018-07-15 NOTE — ANESTHESIA POST-OP PAIN MANAGEMENT
Acute Pain Service Progress Note    Maureen Barrera is a 22 y.o., female, [id number].    Surgery:  Ankle    Post Op Day #: 4      Subjective:     General appearance of alert, oriented, no complaints   Pain with rest: 1    Numbers   Pain with movement: 1    Numbers   Side Effects    1. Pruritis No    2. Nausea No    3. Motor Blockade No, 1=Ability to bend knees and ankles    4. Sedation No, 1=awake and alert     Pain control adequate    Plan:     Patient doing well, continue present treatment. Pt had good pain relief but states lateral part of lower extremity still numb.  Pt reassured. Will F/U with surgeon in a week.    Evaluator Kishor Asher

## 2018-07-16 ENCOUNTER — PATIENT MESSAGE (OUTPATIENT)
Dept: ORTHOPEDICS | Facility: CLINIC | Age: 23
End: 2018-07-16

## 2018-07-16 ENCOUNTER — TELEPHONE (OUTPATIENT)
Dept: ORTHOPEDICS | Facility: CLINIC | Age: 23
End: 2018-07-16

## 2018-07-16 NOTE — TELEPHONE ENCOUNTER
Call place to patient to follow up on earlier conversation in regards to splint rubbing her. Patient declined nurse visit and an appt with Dr. Red for tomorrow. She is going to wait until her follow up appt. Instructed patient that she can get 4x4 gauze and put inside the splint where it is rubbing. Patient stated understanding.

## 2018-07-16 NOTE — TELEPHONE ENCOUNTER
Dr. Red,     I am aware that the cast allows for no swelling however, the cast is uncomfortably tight. I have had my foot elevated about my heart since the day I got home. I am also starting to get a sore behind my knee because the bandaging is tight. I am not taking any pain medication because it makes me sick but, I am not in any pain. I spoke with one of your nurses on Friday and she suggested that I only elevate my ankle, it makes no difference for me. She also suggested that I put a wash cloth in-between the cast and my leg but there is no room for that to happen. I am not sure what to do, because I believe it might be cutting off my circulation a bit. The anesthesiologist spoke with me this morning and he said I should have full feeling back now and I do not. He was a bit concerned about the circulation. Could you please give me a call first thing Monday morning for us to discuss this matter.   Thank you,   Maureen   [phone]

## 2018-07-26 ENCOUNTER — OFFICE VISIT (OUTPATIENT)
Dept: ORTHOPEDICS | Facility: CLINIC | Age: 23
End: 2018-07-26
Payer: COMMERCIAL

## 2018-07-26 VITALS
HEIGHT: 63 IN | DIASTOLIC BLOOD PRESSURE: 79 MMHG | WEIGHT: 180.31 LBS | SYSTOLIC BLOOD PRESSURE: 111 MMHG | HEART RATE: 110 BPM | BODY MASS INDEX: 31.95 KG/M2

## 2018-07-26 DIAGNOSIS — M25.372 ANKLE INSTABILITY, LEFT: Primary | ICD-10-CM

## 2018-07-26 PROCEDURE — 29405 APPL SHORT LEG CAST: CPT | Mod: 58,LT,S$GLB, | Performed by: ORTHOPAEDIC SURGERY

## 2018-07-26 PROCEDURE — 99999 PR PBB SHADOW E&M-EST. PATIENT-LVL III: CPT | Mod: PBBFAC,,, | Performed by: ORTHOPAEDIC SURGERY

## 2018-07-26 PROCEDURE — 99024 POSTOP FOLLOW-UP VISIT: CPT | Mod: S$GLB,,, | Performed by: ORTHOPAEDIC SURGERY

## 2018-07-27 NOTE — PROGRESS NOTES
Subjective:      Patient ID: Maureen Barrera is a 22 y.o. female.    Chief Complaint: Post-op Evaluation of the Left Ankle and Post-op Evaluation (s/p ATS; osmel jefferson 7/11/18)    Doing well today. She rates her pain as 0/10 today.   Social History     Occupational History          Social History Main Topics    Smoking status: Never Smoker    Smokeless tobacco: Never Used    Alcohol use Yes      Comment: once a month    Drug use: No    Sexual activity: Not Currently            Objective:    Ortho Exam     LLE: Neurovascularly intact, incisions well healed, mild swelling, sutures are intact.  No signs of infection. Heat rush up to mid thigh.     Assessment:     s/p left ankle ats and anu      Plan:       Short leg cast applied. Non-weightbearing. F/u 2 weeks, no xray.

## 2018-08-09 ENCOUNTER — OFFICE VISIT (OUTPATIENT)
Dept: ORTHOPEDICS | Facility: CLINIC | Age: 23
End: 2018-08-09
Payer: COMMERCIAL

## 2018-08-09 ENCOUNTER — TELEPHONE (OUTPATIENT)
Dept: ORTHOPEDICS | Facility: CLINIC | Age: 23
End: 2018-08-09

## 2018-08-09 VITALS
DIASTOLIC BLOOD PRESSURE: 92 MMHG | BODY MASS INDEX: 31.95 KG/M2 | WEIGHT: 180.31 LBS | HEIGHT: 63 IN | SYSTOLIC BLOOD PRESSURE: 149 MMHG | HEART RATE: 107 BPM

## 2018-08-09 DIAGNOSIS — M25.372 ANKLE INSTABILITY, LEFT: Primary | ICD-10-CM

## 2018-08-09 PROCEDURE — 97760 ORTHOTIC MGMT&TRAING 1ST ENC: CPT | Mod: S$GLB,,, | Performed by: ORTHOPAEDIC SURGERY

## 2018-08-09 PROCEDURE — 99999 PR PBB SHADOW E&M-EST. PATIENT-LVL III: CPT | Mod: PBBFAC,,, | Performed by: ORTHOPAEDIC SURGERY

## 2018-08-09 PROCEDURE — 99024 POSTOP FOLLOW-UP VISIT: CPT | Mod: S$GLB,,, | Performed by: ORTHOPAEDIC SURGERY

## 2018-08-09 NOTE — TELEPHONE ENCOUNTER
----- Message from Lauren Yang sent at 8/9/2018 12:55 PM CDT -----  Contact: [phone]  Patient is requesting a call back from the nurse want to have cast put back on, rather than the boot.   Please call the patient upon request at phone number [phone].

## 2018-08-09 NOTE — TELEPHONE ENCOUNTER
Returned pt's call, advised that she can wrap ankle/foot w/ ace bandage at night if they gives her peace of mind while sleeping.  Encouraged her to wear boot for 24 hours before deciding if she wants to go back into cast.  Pt verbalized undertstanding.  No further needs at this time.

## 2018-08-09 NOTE — PROGRESS NOTES
Subjective:      Patient ID: Maureen Barrera is a 23 y.o. female.    Chief Complaint: Post-op Evaluation of the Left Ankle (DOS:7/11/18/) and Post-op Evaluation (CT jefferson)    Doing well today. She rates her pain as 0/10 today.   Social History     Occupational History          Social History Main Topics    Smoking status: Never Smoker    Smokeless tobacco: Never Used    Alcohol use Yes      Comment: once a month    Drug use: No    Sexual activity: Not Currently            Objective:    Ortho Exam     LLE: Neurovascularly intact, incisions well healed, mild swelling.  No signs of infection. Mild  tenderness to palpation.     Assessment:     s/p left ankle ats and anu      Plan:       We performed a custom orthotic/brace adjustment, fitting and training with the patient today. The patient demonstrated understanding and proper care. This was performed for 15 minutes.  Short boot  was given.    Non-weightbearing. I instructed the patient on range of motion exercises.  F/u 2 weeks, no xray.

## 2018-08-14 ENCOUNTER — PATIENT MESSAGE (OUTPATIENT)
Dept: ORTHOPEDICS | Facility: CLINIC | Age: 23
End: 2018-08-14

## 2018-08-15 ENCOUNTER — TELEPHONE (OUTPATIENT)
Dept: FAMILY MEDICINE | Facility: CLINIC | Age: 23
End: 2018-08-15

## 2018-08-15 NOTE — TELEPHONE ENCOUNTER
Pt states she had surgery on her left ankle with Dr. Red on 7/11/18 and needs a handicap parking sticker.  Pt states she was told by Dr. Red to ask you as she cannot write for such.

## 2018-08-15 NOTE — TELEPHONE ENCOUNTER
----- Message from RT Leonel sent at 8/15/2018 11:36 AM CDT -----  Contact: pt   Called pod, pt , was requested by Dr. ROSS Red to call your office, thanks.

## 2018-08-23 ENCOUNTER — OFFICE VISIT (OUTPATIENT)
Dept: ORTHOPEDICS | Facility: CLINIC | Age: 23
End: 2018-08-23
Payer: COMMERCIAL

## 2018-08-23 VITALS — WEIGHT: 180.31 LBS | HEIGHT: 63 IN | BODY MASS INDEX: 31.95 KG/M2

## 2018-08-23 DIAGNOSIS — M25.372 ANKLE INSTABILITY, LEFT: Primary | ICD-10-CM

## 2018-08-23 PROCEDURE — 99999 PR PBB SHADOW E&M-EST. PATIENT-LVL III: CPT | Mod: PBBFAC,,, | Performed by: ORTHOPAEDIC SURGERY

## 2018-08-23 PROCEDURE — 99024 POSTOP FOLLOW-UP VISIT: CPT | Mod: S$GLB,,, | Performed by: ORTHOPAEDIC SURGERY

## 2018-08-23 NOTE — PROGRESS NOTES
Subjective:      Patient ID: Maureen Barrera is a 23 y.o. female.    Chief Complaint: Post-op Evaluation of the Left Ankle    Doing well today. She rates her pain as 0/10 today. She wanted and xray done because she was sitting on the floor about a week ago and she felt her ankle was tight and needed to pop so she rotated it and it did pop and then hurt for awhile then felt much better.  She has been walking in the boot with crutches.   Social History     Occupational History    Occupation: vet tech   Tobacco Use    Smoking status: Never Smoker    Smokeless tobacco: Never Used   Substance and Sexual Activity    Alcohol use: Yes     Comment: once a month    Drug use: No    Sexual activity: Not Currently            Objective:    Ortho Exam     LLE: Neurovascularly intact, incisions well healed, mild swelling.  No signs of infection. Minimal tenderness to palpation. Negative anterior drawer compared with contralateral ankle.       XRAYS: 3 views of the left ankle  reviewed today reveal No evidence of fractures or dislocations. Normal study.   Assessment:     s/p left ankle ats and brostom-jefferson      Plan:     PT 2/6.  F/u 4 weeks, no xray.

## 2018-09-10 ENCOUNTER — TELEPHONE (OUTPATIENT)
Dept: SURGERY | Facility: CLINIC | Age: 23
End: 2018-09-10

## 2018-09-10 ENCOUNTER — TELEPHONE (OUTPATIENT)
Dept: FAMILY MEDICINE | Facility: CLINIC | Age: 23
End: 2018-09-10

## 2018-09-10 RX ORDER — HYDROXYZINE HYDROCHLORIDE 25 MG/1
25 TABLET, FILM COATED ORAL 3 TIMES DAILY PRN
Qty: 20 TABLET | Refills: 0 | Status: SHIPPED | OUTPATIENT
Start: 2018-09-10 | End: 2018-11-08

## 2018-09-10 RX ORDER — PREDNISONE 20 MG/1
TABLET ORAL
Qty: 9 TABLET | Refills: 0 | Status: SHIPPED | OUTPATIENT
Start: 2018-09-10 | End: 2018-11-08

## 2018-09-10 NOTE — TELEPHONE ENCOUNTER
----- Message from Viktoriya Brennan sent at 9/10/2018 10:55 AM CDT -----  Contact: self  Patient has been extremely itchy all over since Saturday and cannot take benadryl. Other antihistamines have not helped. Needs to be seen today. Nothing available at any clinic or priority care until tomorrow and Urgent care is too expensive. Please call back at [phone] (home)

## 2018-09-10 NOTE — TELEPHONE ENCOUNTER
Called pt and notified her that Bree sent in some itching medicine and a steroid dose pack to help with her rash, for her to try this and if it doesn't help we can try to fit her in another day.  Pt verbalizes understanding.

## 2018-09-10 NOTE — TELEPHONE ENCOUNTER
----- Message from Alivia Means sent at 9/10/2018 10:44 AM CDT -----  Call [phone] has had 2 x gall bladder attacks in the last 2-3 weeks ... Next available is 09/17 ... Afraid she may have a 3 rd attack and will need to go to er / asking for sooner appt

## 2018-09-10 NOTE — TELEPHONE ENCOUNTER
I can't see her today. I can send in some atarax for the itching and a steroid pack for the rash. If this does not make it improve she  Will need to be seen or she can wait and come see me tomorrow.

## 2018-09-10 NOTE — TELEPHONE ENCOUNTER
Did not accept an appointment for alyse. Misunderstood the , thought she said we'd get her in. She has been itching since Saturday and can't take Benadryl because it makes her irritable.She has tried zyrtec, claritin, etc and it does not help.Does not know what caused it. Said her back, arms chest are red.

## 2018-09-10 NOTE — TELEPHONE ENCOUNTER
----- Message from Gely Burnette sent at 9/10/2018  4:43 PM CDT -----  Type:  Patient Returning Call    Who Called:  Patient  Who Left Message for Patient: CHITO CASTRO   Does the patient know what this is regarding?:  Appointment  Best Call Back Number:  [phone]

## 2018-09-17 ENCOUNTER — PATIENT MESSAGE (OUTPATIENT)
Dept: FAMILY MEDICINE | Facility: CLINIC | Age: 23
End: 2018-09-17

## 2018-09-17 ENCOUNTER — OFFICE VISIT (OUTPATIENT)
Dept: SURGERY | Facility: CLINIC | Age: 23
End: 2018-09-17
Payer: COMMERCIAL

## 2018-09-17 VITALS
WEIGHT: 181.69 LBS | DIASTOLIC BLOOD PRESSURE: 72 MMHG | SYSTOLIC BLOOD PRESSURE: 131 MMHG | BODY MASS INDEX: 32.18 KG/M2 | HEART RATE: 97 BPM

## 2018-09-17 DIAGNOSIS — R10.9 ABDOMINAL PAIN, UNSPECIFIED ABDOMINAL LOCATION: Primary | ICD-10-CM

## 2018-09-17 PROCEDURE — 99243 OFF/OP CNSLTJ NEW/EST LOW 30: CPT | Mod: S$GLB,,, | Performed by: SURGERY

## 2018-09-17 PROCEDURE — 99999 PR PBB SHADOW E&M-EST. PATIENT-LVL III: CPT | Mod: PBBFAC,,, | Performed by: SURGERY

## 2018-09-17 RX ORDER — PANTOPRAZOLE SODIUM 20 MG/1
20 TABLET, DELAYED RELEASE ORAL DAILY
COMMUNITY
End: 2018-09-18 | Stop reason: SDUPTHER

## 2018-09-17 NOTE — LETTER
September 27, 2018      Bree Stovall, PAUL  [id number] [address] [address]           [place] [facility] [address] [facility] [address] [place] [address]  Phone: [phone]          Patient: Maureen Barrera   MR Number: [id number]   YOB: 1995   Date of Visit: 9/17/2018       Dear Bree Stovall:    Thank you for referring Maureen Barrera to me for evaluation. Attached you will find relevant portions of my assessment and plan of care.    If you have questions, please do not hesitate to call me. I look forward to following Maureen Barrera along with you.    Sincerely,    Placido Olivas  CC:  No Recipients    If you would like to receive this communication electronically, please contact [email or web address] or [phone] to request more information on [employer] access.    For providers and/or their staff who would like to refer a patient to [facility], please contact us through our one-stop-shop provider referral line, Shy Kingsley, at [phone].    If you feel you have received this communication in error or would no longer like to receive these types of communications, please e-mail [email or web address]

## 2018-09-18 RX ORDER — PANTOPRAZOLE SODIUM 20 MG/1
20 TABLET, DELAYED RELEASE ORAL DAILY
Qty: 30 TABLET | Refills: 4 | Status: SHIPPED | OUTPATIENT
Start: 2018-09-18 | End: 2019-02-20 | Stop reason: SDUPTHER

## 2018-09-27 ENCOUNTER — OFFICE VISIT (OUTPATIENT)
Dept: ORTHOPEDICS | Facility: CLINIC | Age: 23
End: 2018-09-27
Payer: COMMERCIAL

## 2018-09-27 VITALS
DIASTOLIC BLOOD PRESSURE: 74 MMHG | BODY MASS INDEX: 32.07 KG/M2 | HEART RATE: 87 BPM | WEIGHT: 181 LBS | SYSTOLIC BLOOD PRESSURE: 128 MMHG | HEIGHT: 63 IN

## 2018-09-27 DIAGNOSIS — M25.372 ANKLE INSTABILITY, LEFT: Primary | ICD-10-CM

## 2018-09-27 PROCEDURE — 99999 PR PBB SHADOW E&M-EST. PATIENT-LVL III: CPT | Mod: PBBFAC,,, | Performed by: ORTHOPAEDIC SURGERY

## 2018-09-27 PROCEDURE — 99024 POSTOP FOLLOW-UP VISIT: CPT | Mod: S$GLB,,, | Performed by: ORTHOPAEDIC SURGERY

## 2018-09-27 NOTE — PROGRESS NOTES
Subjective:      Patient ID: Maureen Barrera is a 23 y.o. female.    Chief Complaint: Pain of the Left Ankle    Doing well today. She rates her pain as 2/10 today. She says she is very pleased with it and is doing well with PT.   Social History     Occupational History    Occupation: vet tech   Tobacco Use    Smoking status: Never Smoker    Smokeless tobacco: Never Used   Substance and Sexual Activity    Alcohol use: Yes     Comment: once a month    Drug use: No    Sexual activity: Not Currently            Objective:    Ortho Exam     LLE: Neurovascularly intact, incisions well healed, mild swelling.  No signs of infection. Minimal tenderness to palpation. Negative anterior drawer compared with contralateral ankle.       Assessment:     s/p left ankle ats and brostom-jefferson      Plan:     Continue PT 2/6.  Ok to return to work full duty.   We performed a custom orthotic/brace adjustment, fitting and training with the patient today. The patient demonstrated understanding and proper care. This was performed for 15 minutes.  Lace up ankle brace  was given.       F/u 6-8 weeks, no xray.

## 2018-09-27 NOTE — PROGRESS NOTES
Subjective:       Patient ID: Maureen Barrera is a 23 y.o. female.    Chief Complaint: Consult (gallbladder )      HPI 23-year-old female presents with complaint of upper abdominal pain associated with nausea vomiting.  This is worse after eating food.  There is no particular type of food brings this on.  She denies any fever or chills.  She had an ultrasound which showed sludge in the gallbladder.  There is no evidence of cholelithiasis.  She is not having any pain today however.    Past Medical History:   Diagnosis Date    Anxiety     Eczema     Fracture of left foot 2010    GERD (gastroesophageal reflux disease)     Headache(784.0)     Lactose intolerance     Polyp of nasal cavity     PONV (postoperative nausea and vomiting)     Recurrent upper respiratory infection (URI)     Sprain of right ankle 10/26/11    Varicella     2nd grade     Past Surgical History:   Procedure Laterality Date    ARTHROSCOPY OF ANKLE WITH DEBRIDEMENT Left 7/11/2018    Procedure: ARTHROSCOPY, ANKLE, WITH DEBRIDEMENT;  Surgeon: Jimmie Red MD;  Location: [facility] OR;  Service: Orthopedics;  Laterality: Left;    ARTHROSCOPY, ANKLE, WITH DEBRIDEMENT Left 7/11/2018    Performed by Jimmie Red MD at [facility] OR    FESS (FUNCTIONAL ENDOSCOPIC SINUS SURGERY) Bilateral 3/5/2013    [facility]    PLANTAR'S WART EXCISION  january 2015    POLYPECTOMY      Nasal    POLYPECTOMY-NASAL Right 3/5/2013    [facility]    REPAIR,LIGAMENT Left 7/11/2018    Performed by Jimmie Red MD at [facility] OR    SPHENOIDECTOMY      SPHENOIDECTOMY Right 3/5/2013    [facility]         Current Outpatient Medications:     HYDROcodone-acetaminophen (NORCO)  mg per tablet, Take 1 tablet by mouth every 4 (four) hours as needed for Pain., Disp: 42 tablet, Rfl: 0    hydrOXYzine HCl (ATARAX) 25 MG tablet, Take 1  tablet (25 mg total) by mouth 3 (three) times daily as needed for Itching., Disp: 20 tablet, Rfl: 0    mupirocin (BACTROBAN) 2 % ointment, SHERYL AA BID, Disp: , Rfl: 0    norgestimate-ethinyl estradiol (ORTHO TRI-CYCLEN LO) 0.18/0.215/0.25 mg-25 mcg tablet, Take 1 tablet by mouth once daily., Disp: 28 tablet, Rfl: 12    ondansetron (ZOFRAN) 8 MG tablet, Take 1 tablet (8 mg total) by mouth every 6 (six) hours as needed for Nausea., Disp: 20 tablet, Rfl: 1    ondansetron (ZOFRAN-ODT) 4 MG TbDL, Take 2 tablets (8 mg total) by mouth every 8 (eight) hours as needed., Disp: 42 tablet, Rfl: 1    predniSONE (DELTASONE) 20 MG tablet, Two tablets in am for 3  Days then one tablet for 2 days then 1/2 for 2 days., Disp: 9 tablet, Rfl: 0    pantoprazole (PROTONIX) 20 MG tablet, Take 1 tablet (20 mg total) by mouth once daily., Disp: 30 tablet, Rfl: 4    Review of patient's allergies indicates:  No Known Allergies    Family History   Problem Relation Age of Onset    Allergies Father     Eczema Father     Immunodeficiency Father     Allergies Paternal Aunt     Eczema Paternal Aunt     Allergies Paternal Uncle     Eczema Paternal Uncle     Immunodeficiency Maternal Grandmother     Allergies Paternal Grandmother     Angioedema Paternal Grandmother     Eczema Paternal Grandmother     Allergies Paternal Grandfather     Eczema Paternal Grandfather     Allergic rhinitis Neg Hx     Asthma Neg Hx     Atopy Neg Hx     Rhinitis Neg Hx     Urticaria Neg Hx      Social History     Socioeconomic History    Marital status: Single     Spouse name: Not on file    Number of children: Not on file    Years of education: Not on file    Highest education level: Not on file   Social Needs    Financial resource strain: Not on file    Food insecurity - worry: Not on file    Food insecurity - inability: Not on file    Transportation needs - medical: Not on file    Transportation needs - non-medical: Not on file    Occupational History    Occupation: vet tech   Tobacco Use    Smoking status: Never Smoker    Smokeless tobacco: Never Used   Substance and Sexual Activity    Alcohol use: Yes     Comment: once a month    Drug use: No    Sexual activity: Not Currently   Other Topics Concern    Not on file   Social History Narrative    Born in [place].  Moved from [place] in 2011.       Review of Systems   Constitutional: Negative for activity change, chills, fever and unexpected weight change.   HENT: Negative for congestion, sore throat, trouble swallowing and voice change.    Eyes: Negative for redness and visual disturbance.   Respiratory: Negative for cough, shortness of breath and wheezing.    Cardiovascular: Negative for chest pain and palpitations.   Gastrointestinal: Positive for abdominal pain and nausea. Negative for blood in stool and vomiting.   Endocrine: Negative.    Genitourinary: Negative for dysuria, frequency and hematuria.   Musculoskeletal: Negative for arthralgias, back pain and neck pain.   Skin: Negative for rash and wound.   Allergic/Immunologic: Negative.    Neurological: Negative for dizziness, weakness and headaches.   Hematological: Negative for adenopathy.   Psychiatric/Behavioral: Negative for agitation and dysphoric mood. The patient is not nervous/anxious.      Objective:     Physical Exam   Constitutional: She is oriented to person, place, and time. She appears well-developed and well-nourished. No distress.   HENT:   Head: Normocephalic and atraumatic.   Mouth/Throat: Oropharynx is clear and moist. No oropharyngeal exudate.   Eyes: Conjunctivae and EOM are normal. Pupils are equal, round, and reactive to light. No scleral icterus.   Neck: Normal range of motion. No thyromegaly present.   Cardiovascular: Normal rate and regular rhythm.   No murmur heard.  Pulmonary/Chest: Effort normal and breath sounds normal. She has no wheezes. She has no rales.   Abdominal: Soft. Bowel sounds are normal.  She exhibits no distension and no mass. There is no tenderness. No hernia.   Musculoskeletal: Normal range of motion. She exhibits no edema.   Lymphadenopathy:     She has no cervical adenopathy.   Neurological: She is alert and oriented to person, place, and time. No cranial nerve deficit.   Skin: Skin is warm and dry. No rash noted. No erythema.   Psychiatric: She has a normal mood and affect. Her behavior is normal.     Ultrasound done at an outside facility reveals gallbladder sludge but no definite cholelithiasis.    Assessment:   Abdominal pain and gallbladder sludge  Plan:      1.  Discussed possibility of laparoscopic cholecystectomy.  2.  Patient will observe for now and call when she is ready to schedule.

## 2018-11-08 ENCOUNTER — OFFICE VISIT (OUTPATIENT)
Dept: ORTHOPEDICS | Facility: CLINIC | Age: 23
End: 2018-11-08
Payer: COMMERCIAL

## 2018-11-08 VITALS
DIASTOLIC BLOOD PRESSURE: 86 MMHG | WEIGHT: 181 LBS | BODY MASS INDEX: 32.07 KG/M2 | SYSTOLIC BLOOD PRESSURE: 146 MMHG | HEIGHT: 63 IN | HEART RATE: 88 BPM

## 2018-11-08 DIAGNOSIS — M25.372 ANKLE INSTABILITY, LEFT: Primary | ICD-10-CM

## 2018-11-08 PROCEDURE — 99999 PR PBB SHADOW E&M-EST. PATIENT-LVL III: CPT | Mod: PBBFAC,,, | Performed by: ORTHOPAEDIC SURGERY

## 2018-11-08 PROCEDURE — 99024 POSTOP FOLLOW-UP VISIT: CPT | Mod: S$GLB,,, | Performed by: ORTHOPAEDIC SURGERY

## 2018-11-08 NOTE — PROGRESS NOTES
Subjective:      Patient ID: Maureen Barrera is a 23 y.o. female.    Chief Complaint: Post-op Evaluation of the Left Ankle and Post-op Evaluation (s/p ATS; osmel jefferson 7/11/18)    Doing very well today. She rates her pain as 0/10 today.   Social History     Occupational History    Occupation: vet tech   Tobacco Use    Smoking status: Never Smoker    Smokeless tobacco: Never Used   Substance and Sexual Activity    Alcohol use: Yes     Comment: once a month    Drug use: No    Sexual activity: Not Currently            Objective:    Ortho Exam     LLE: Neurovascularly intact, incisions well healed, no swelling.  No  tenderness to palpation. Negative anterior drawer compared with contralateral ankle.       Assessment:     s/p left ankle ats and anu      Plan:     Continue PT HEP. F/u prn.

## 2019-02-21 RX ORDER — PANTOPRAZOLE SODIUM 20 MG/1
TABLET, DELAYED RELEASE ORAL
Qty: 30 TABLET | Refills: 0 | Status: SHIPPED | OUTPATIENT
Start: 2019-02-21 | End: 2019-03-25 | Stop reason: SDUPTHER

## 2019-03-25 RX ORDER — PANTOPRAZOLE SODIUM 20 MG/1
TABLET, DELAYED RELEASE ORAL
Qty: 30 TABLET | Refills: 0 | Status: SHIPPED | OUTPATIENT
Start: 2019-03-25 | End: 2019-04-30 | Stop reason: SDUPTHER

## 2019-05-01 RX ORDER — PANTOPRAZOLE SODIUM 20 MG/1
TABLET, DELAYED RELEASE ORAL
Qty: 30 TABLET | Refills: 0 | Status: SHIPPED | OUTPATIENT
Start: 2019-05-01 | End: 2019-05-27 | Stop reason: SDUPTHER

## 2019-05-20 ENCOUNTER — TELEPHONE (OUTPATIENT)
Dept: SURGERY | Facility: CLINIC | Age: 24
End: 2019-05-20

## 2019-05-20 ENCOUNTER — NURSE TRIAGE (OUTPATIENT)
Dept: ADMINISTRATIVE | Facility: CLINIC | Age: 24
End: 2019-05-20

## 2019-05-20 NOTE — TELEPHONE ENCOUNTER
----- Message from Flory Su sent at 5/20/2019  7:58 AM CDT -----  Contact: pt  Pt called to speak to the nurse to schedule a work in appt today for gallbladder sx removal and would like a call back this morning asap at [phone]

## 2019-05-20 NOTE — TELEPHONE ENCOUNTER
"    Reason for Disposition   Abdominal pain is a chronic symptom (recurrent or ongoing AND lasting > 4 weeks)    Additional Information   Negative: SEVERE abdominal pain (e.g., excruciating)     6/10    Protocols used: ABDOMINAL PAIN - FEMALE-A-OH    Maureen was last seen 09/2018 by Dr Zachary Osborn, and cholecystectomy was advised then.  She did not have insurance at that time.  She states she has been having "constant abdominal pain", and rates the pain 5-6 of 10, but sometimes is 10 of 10.  She wants to schedule an appointment as soon as possible with Dr Osborn for purpose of scheduling the cholecystectomy quickly.  Assured her message will go to Dr Osborn.  She is on the [facility], and wants (if possible) the surgery done on the [facility].  Care advice given, and encouraged call back for worsening symptoms, increasing pain, fever, any new concerns.  She states she will.  Please contact caller directly with any additional care advice.      "

## 2019-05-22 ENCOUNTER — OFFICE VISIT (OUTPATIENT)
Dept: SURGERY | Facility: CLINIC | Age: 24
End: 2019-05-22
Payer: COMMERCIAL

## 2019-05-22 VITALS
RESPIRATION RATE: 16 BRPM | WEIGHT: 187.38 LBS | HEART RATE: 84 BPM | DIASTOLIC BLOOD PRESSURE: 63 MMHG | BODY MASS INDEX: 33.2 KG/M2 | SYSTOLIC BLOOD PRESSURE: 144 MMHG | HEIGHT: 63 IN

## 2019-05-22 DIAGNOSIS — K80.50 BILIARY COLIC: Primary | ICD-10-CM

## 2019-05-22 PROCEDURE — 3008F BODY MASS INDEX DOCD: CPT | Mod: CPTII,S$GLB,, | Performed by: SURGERY

## 2019-05-22 PROCEDURE — 99214 PR OFFICE/OUTPT VISIT, EST, LEVL IV, 30-39 MIN: ICD-10-PCS | Mod: S$GLB,,, | Performed by: SURGERY

## 2019-05-22 PROCEDURE — 3008F PR BODY MASS INDEX (BMI) DOCUMENTED: ICD-10-PCS | Mod: CPTII,S$GLB,, | Performed by: SURGERY

## 2019-05-22 PROCEDURE — 99214 OFFICE O/P EST MOD 30 MIN: CPT | Mod: S$GLB,,, | Performed by: SURGERY

## 2019-05-22 PROCEDURE — 99999 PR PBB SHADOW E&M-EST. PATIENT-LVL III: CPT | Mod: PBBFAC,,, | Performed by: SURGERY

## 2019-05-22 PROCEDURE — 99999 PR PBB SHADOW E&M-EST. PATIENT-LVL III: ICD-10-PCS | Mod: PBBFAC,,, | Performed by: SURGERY

## 2019-05-22 NOTE — PROGRESS NOTES
Subjective:       Patient ID: Maureen Barrera is a 23 y.o. female.    Chief Complaint: Consult and Gall Bladder Problem    HPI  Pleasant 22 yo F referred to me for evaluation of biliary colic.  Pt seen in October by Dr Osborn for evaluation of biliary colic. Pt notes that she was not ready to proceed with surgery at that time.  Pt notes that she has been having episodes of pain over the last few months.  She reports an attack earlier this week that was particularly painful.  Pt reports RUQ pain and tenderness extending into her epigastric region with these attacks.  Her last one woke her from her sleep and lasted throughout the day the next day.  In association with pain she is having n/v.  No fever/chills.  Pt notes that she is otherwise healthy.  No other significant PSHx  Review of Systems   Constitutional: Negative for activity change, appetite change, fever and unexpected weight change.   HENT: Negative for congestion and mouth sores.    Respiratory: Negative for chest tightness, shortness of breath and wheezing.    Cardiovascular: Negative for chest pain.   Gastrointestinal: Positive for abdominal pain and nausea. Negative for abdominal distention, anal bleeding, blood in stool, constipation, diarrhea, rectal pain and vomiting.   Genitourinary: Negative for difficulty urinating, dysuria and frequency.   Skin: Negative for color change and wound.   Neurological: Negative for dizziness.   Hematological: Negative for adenopathy. Does not bruise/bleed easily.   Psychiatric/Behavioral: Negative for agitation and decreased concentration.       Objective:      Physical Exam   Constitutional: She is oriented to person, place, and time. She appears well-developed. No distress.   HENT:   Head: Normocephalic and atraumatic.   Eyes: Pupils are equal, round, and reactive to light.   Neck: Normal range of motion. Neck supple. No tracheal deviation present. No thyromegaly present.   Cardiovascular: Normal rate, regular  rhythm and normal heart sounds.   No murmur heard.  Pulmonary/Chest: Effort normal and breath sounds normal. No respiratory distress. She exhibits no tenderness.   Abdominal: Soft. Bowel sounds are normal. She exhibits no distension, no abdominal bruit, no pulsatile midline mass and no mass. There is no hepatosplenomegaly. There is no tenderness. There is no rigidity, no rebound, no guarding, no tenderness at McBurney's point and negative Mccormick's sign. No hernia. Hernia confirmed negative in the ventral area.   Genitourinary: Rectum normal.   Musculoskeletal: Normal range of motion. She exhibits no edema, tenderness or deformity.   Lymphadenopathy:     She has no cervical adenopathy.   Neurological: She is alert and oriented to person, place, and time.   Skin: Skin is warm. No rash noted. She is not diaphoretic. No erythema. No pallor.   Psychiatric: She has a normal mood and affect. Her behavior is normal.   Vitals reviewed.        US at outside facility demonstrating sludge of GB  Assessment:     Biliary sludge  No diagnosis found.    Plan:       D/w pt.  I have reviewed with her that her pain may be related to sludge in her GB and as such I have offered lap darline. I have reviewed with pt risks and benefits of surgery and have received informed consent.  She wll d/w her work and pick time to have surgery.  She will call to arrange

## 2019-05-22 NOTE — LETTER
May 22, 2019      Zachary Osborn MD  [address]  [address]  [place] [address]           [facility]  [address] [facility] [address] [place] [address]  Phone: [phone]          Patient: Maureen Barrera   MR Number: [id number]   YOB: 1995   Date of Visit: 5/22/2019       Dear Dr. Zachary Osborn:    Thank you for referring Maureen Barrera to me for evaluation. Attached you will find relevant portions of my assessment and plan of care.    If you have questions, please do not hesitate to call me. I look forward to following Maureen Barrera along with you.    Sincerely,    Carlos Alberto Baker MD    Enclosure  CC:  No Recipients    If you would like to receive this communication electronically, please contact [email or web address] or [phone] to request more information on [facility] access.    For providers and/or their staff who would like to refer a patient to [facility], please contact us through our one-stop-shop provider referral line, [facility], at [phone].    If you feel you have received this communication in error or would no longer like to receive these types of communications, please e-mail [email or web address]

## 2019-05-27 ENCOUNTER — TELEPHONE (OUTPATIENT)
Dept: SURGERY | Facility: CLINIC | Age: 24
End: 2019-05-27

## 2019-05-27 ENCOUNTER — PATIENT MESSAGE (OUTPATIENT)
Dept: SURGERY | Facility: CLINIC | Age: 24
End: 2019-05-27

## 2019-05-27 DIAGNOSIS — K80.50 BILIARY COLIC: Primary | ICD-10-CM

## 2019-05-27 RX ORDER — METRONIDAZOLE 500 MG/100ML
500 INJECTION, SOLUTION INTRAVENOUS
Status: CANCELLED | OUTPATIENT
Start: 2019-05-27

## 2019-05-27 RX ORDER — SODIUM CHLORIDE 9 MG/ML
INJECTION, SOLUTION INTRAVENOUS CONTINUOUS
Status: CANCELLED | OUTPATIENT
Start: 2019-05-27

## 2019-05-27 RX ORDER — LIDOCAINE HYDROCHLORIDE 10 MG/ML
1 INJECTION, SOLUTION EPIDURAL; INFILTRATION; INTRACAUDAL; PERINEURAL ONCE
Status: CANCELLED | OUTPATIENT
Start: 2019-05-27 | End: 2019-05-27

## 2019-05-27 RX ORDER — PANTOPRAZOLE SODIUM 20 MG/1
TABLET, DELAYED RELEASE ORAL
Qty: 30 TABLET | Refills: 0 | Status: SHIPPED | OUTPATIENT
Start: 2019-05-27 | End: 2019-06-28 | Stop reason: SDUPTHER

## 2019-05-27 NOTE — TELEPHONE ENCOUNTER
----- Message from Rylie Carpenter sent at 5/27/2019  2:54 PM CDT -----   Pt is calling to   Schedule  Surgery // please call [phone]

## 2019-05-27 NOTE — TELEPHONE ENCOUNTER
Surgery is scheduled for 07/10/19 arrival time per the preop nurse.  Preop will call from [phone]  Fasting after midnight  Someone to drive you home      THE PREOP NURSE WILL CALL, SOMETIMES AS LATE AS 4 PM IN THE AFTERNOON THE DAY BEFORE SURGERY.    Bathe the night before and the morning of your procedure with a Chlorhexidine wash such as Hibiclens, can be purchased at most Pharmacy's.    Special Instruction: no

## 2019-06-17 RX ORDER — NORGESTIMATE AND ETHINYL ESTRADIOL 7DAYSX3 LO
KIT ORAL
Qty: 28 TABLET | Refills: 1 | Status: SHIPPED | OUTPATIENT
Start: 2019-06-17 | End: 2019-10-11 | Stop reason: SDUPTHER

## 2019-07-01 RX ORDER — PANTOPRAZOLE SODIUM 20 MG/1
TABLET, DELAYED RELEASE ORAL
Qty: 30 TABLET | Refills: 0 | Status: SHIPPED | OUTPATIENT
Start: 2019-07-01 | End: 2019-07-25 | Stop reason: ALTCHOICE

## 2019-07-03 ENCOUNTER — TELEPHONE (OUTPATIENT)
Dept: SURGERY | Facility: HOSPITAL | Age: 24
End: 2019-07-03

## 2019-07-03 NOTE — TELEPHONE ENCOUNTER
When doing pre op call, patient was unsure if she needed to get new bloodwork done, or would the last one she had done be sufficient for this procedure.  If she needs to get any labs drawn, please contact the patient. She also has to go to [facility] due to her insurance if so.  Thank you

## 2019-07-09 ENCOUNTER — ANESTHESIA EVENT (OUTPATIENT)
Dept: SURGERY | Facility: HOSPITAL | Age: 24
End: 2019-07-09
Payer: COMMERCIAL

## 2019-07-10 ENCOUNTER — HOSPITAL ENCOUNTER (OUTPATIENT)
Facility: HOSPITAL | Age: 24
Discharge: HOME | End: 2019-07-10
Attending: SURGERY | Admitting: SURGERY
Payer: COMMERCIAL

## 2019-07-10 ENCOUNTER — ANESTHESIA (OUTPATIENT)
Dept: SURGERY | Facility: HOSPITAL | Age: 24
End: 2019-07-10
Payer: COMMERCIAL

## 2019-07-10 VITALS
RESPIRATION RATE: 16 BRPM | SYSTOLIC BLOOD PRESSURE: 122 MMHG | TEMPERATURE: 98 F | BODY MASS INDEX: 33.2 KG/M2 | HEIGHT: 63 IN | DIASTOLIC BLOOD PRESSURE: 58 MMHG | OXYGEN SATURATION: 100 % | HEART RATE: 91 BPM | WEIGHT: 187.38 LBS

## 2019-07-10 DIAGNOSIS — K80.50 BILIARY COLIC: ICD-10-CM

## 2019-07-10 LAB
B-HCG UR QL: NEGATIVE
CTP QC/QA: YES

## 2019-07-10 PROCEDURE — 88304 TISSUE EXAM BY PATHOLOGIST: CPT | Performed by: PATHOLOGY

## 2019-07-10 PROCEDURE — 81025 URINE PREGNANCY TEST: CPT | Mod: PO | Performed by: SURGERY

## 2019-07-10 PROCEDURE — 25000003 PHARM REV CODE 250: Mod: PO | Performed by: ANESTHESIOLOGY

## 2019-07-10 PROCEDURE — 36000708 HC OR TIME LEV III 1ST 15 MIN: Mod: PO | Performed by: SURGERY

## 2019-07-10 PROCEDURE — 25000003 PHARM REV CODE 250: Mod: PO | Performed by: SURGERY

## 2019-07-10 PROCEDURE — S0030 INJECTION, METRONIDAZOLE: HCPCS | Mod: PO | Performed by: SURGERY

## 2019-07-10 PROCEDURE — 37000009 HC ANESTHESIA EA ADD 15 MINS: Mod: PO | Performed by: SURGERY

## 2019-07-10 PROCEDURE — 63600175 PHARM REV CODE 636 W HCPCS: Mod: PO | Performed by: ANESTHESIOLOGY

## 2019-07-10 PROCEDURE — 37000008 HC ANESTHESIA 1ST 15 MINUTES: Mod: PO | Performed by: SURGERY

## 2019-07-10 PROCEDURE — D9220A PRA ANESTHESIA: ICD-10-PCS | Mod: ANES,,, | Performed by: ANESTHESIOLOGY

## 2019-07-10 PROCEDURE — 27201423 OPTIME MED/SURG SUP & DEVICES STERILE SUPPLY: Mod: PO | Performed by: SURGERY

## 2019-07-10 PROCEDURE — D9220A PRA ANESTHESIA: ICD-10-PCS | Mod: CRNA,,, | Performed by: NURSE ANESTHETIST, CERTIFIED REGISTERED

## 2019-07-10 PROCEDURE — 25000003 PHARM REV CODE 250: Mod: PO | Performed by: NURSE ANESTHETIST, CERTIFIED REGISTERED

## 2019-07-10 PROCEDURE — 88304 TISSUE EXAM BY PATHOLOGIST: CPT | Mod: 26,,, | Performed by: PATHOLOGY

## 2019-07-10 PROCEDURE — 71000015 HC POSTOP RECOV 1ST HR: Mod: PO | Performed by: SURGERY

## 2019-07-10 PROCEDURE — 63600175 PHARM REV CODE 636 W HCPCS: Mod: PO | Performed by: SURGERY

## 2019-07-10 PROCEDURE — 36000709 HC OR TIME LEV III EA ADD 15 MIN: Mod: PO | Performed by: SURGERY

## 2019-07-10 PROCEDURE — S0028 INJECTION, FAMOTIDINE, 20 MG: HCPCS | Mod: PO | Performed by: ANESTHESIOLOGY

## 2019-07-10 PROCEDURE — D9220A PRA ANESTHESIA: Mod: CRNA,,, | Performed by: NURSE ANESTHETIST, CERTIFIED REGISTERED

## 2019-07-10 PROCEDURE — 63600175 PHARM REV CODE 636 W HCPCS: Mod: PO | Performed by: NURSE ANESTHETIST, CERTIFIED REGISTERED

## 2019-07-10 PROCEDURE — 47562 LAPAROSCOPIC CHOLECYSTECTOMY: CPT | Mod: ,,, | Performed by: SURGERY

## 2019-07-10 PROCEDURE — 47562 PR LAP,CHOLECYSTECTOMY: ICD-10-PCS | Mod: ,,, | Performed by: SURGERY

## 2019-07-10 PROCEDURE — 71000039 HC RECOVERY, EACH ADD'L HOUR: Mod: PO | Performed by: SURGERY

## 2019-07-10 PROCEDURE — D9220A PRA ANESTHESIA: Mod: ANES,,, | Performed by: ANESTHESIOLOGY

## 2019-07-10 PROCEDURE — 71000033 HC RECOVERY, INTIAL HOUR: Mod: PO | Performed by: SURGERY

## 2019-07-10 PROCEDURE — 88304 TISSUE SPECIMEN TO PATHOLOGY - SURGERY: ICD-10-PCS | Mod: 26,,, | Performed by: PATHOLOGY

## 2019-07-10 RX ORDER — LIDOCAINE HYDROCHLORIDE 10 MG/ML
1 INJECTION, SOLUTION EPIDURAL; INFILTRATION; INTRACAUDAL; PERINEURAL ONCE
Status: DISCONTINUED | OUTPATIENT
Start: 2019-07-10 | End: 2019-07-10

## 2019-07-10 RX ORDER — GLYCOPYRROLATE 0.2 MG/ML
INJECTION INTRAMUSCULAR; INTRAVENOUS
Status: DISCONTINUED | OUTPATIENT
Start: 2019-07-10 | End: 2019-07-10

## 2019-07-10 RX ORDER — LORAZEPAM 2 MG/ML
0.25 INJECTION INTRAMUSCULAR ONCE
Status: DISCONTINUED | OUTPATIENT
Start: 2019-07-10 | End: 2019-07-10

## 2019-07-10 RX ORDER — SODIUM CHLORIDE 0.9 % (FLUSH) 0.9 %
3 SYRINGE (ML) INJECTION EVERY 8 HOURS
Status: DISCONTINUED | OUTPATIENT
Start: 2019-07-10 | End: 2019-09-13

## 2019-07-10 RX ORDER — SODIUM CHLORIDE, SODIUM LACTATE, POTASSIUM CHLORIDE, CALCIUM CHLORIDE 600; 310; 30; 20 MG/100ML; MG/100ML; MG/100ML; MG/100ML
INJECTION, SOLUTION INTRAVENOUS CONTINUOUS
Status: DISCONTINUED | OUTPATIENT
Start: 2019-07-10 | End: 2019-09-13

## 2019-07-10 RX ORDER — SODIUM CHLORIDE, SODIUM LACTATE, POTASSIUM CHLORIDE, CALCIUM CHLORIDE 600; 310; 30; 20 MG/100ML; MG/100ML; MG/100ML; MG/100ML
10 INJECTION, SOLUTION INTRAVENOUS CONTINUOUS
Status: DISCONTINUED | OUTPATIENT
Start: 2019-07-10 | End: 2019-09-13

## 2019-07-10 RX ORDER — LIDOCAINE HYDROCHLORIDE 10 MG/ML
1 INJECTION, SOLUTION EPIDURAL; INFILTRATION; INTRACAUDAL; PERINEURAL ONCE
Status: DISCONTINUED | OUTPATIENT
Start: 2019-07-10 | End: 2019-09-13

## 2019-07-10 RX ORDER — MIDAZOLAM HYDROCHLORIDE 1 MG/ML
INJECTION, SOLUTION INTRAMUSCULAR; INTRAVENOUS
Status: DISCONTINUED | OUTPATIENT
Start: 2019-07-10 | End: 2019-07-10

## 2019-07-10 RX ORDER — OXYCODONE HYDROCHLORIDE 5 MG/1
5 TABLET ORAL
Status: DISCONTINUED | OUTPATIENT
Start: 2019-07-10 | End: 2019-07-10 | Stop reason: HOSPADM

## 2019-07-10 RX ORDER — KETOROLAC TROMETHAMINE 30 MG/ML
INJECTION, SOLUTION INTRAMUSCULAR; INTRAVENOUS
Status: DISCONTINUED | OUTPATIENT
Start: 2019-07-10 | End: 2019-07-10

## 2019-07-10 RX ORDER — DEXAMETHASONE SODIUM PHOSPHATE 4 MG/ML
8 INJECTION, SOLUTION INTRA-ARTICULAR; INTRALESIONAL; INTRAMUSCULAR; INTRAVENOUS; SOFT TISSUE
Status: COMPLETED | OUTPATIENT
Start: 2019-07-10 | End: 2019-07-10

## 2019-07-10 RX ORDER — LIDOCAINE HCL/PF 100 MG/5ML
SYRINGE (ML) INTRAVENOUS
Status: DISCONTINUED | OUTPATIENT
Start: 2019-07-10 | End: 2019-07-10

## 2019-07-10 RX ORDER — HYDROCODONE BITARTRATE AND ACETAMINOPHEN 5; 325 MG/1; MG/1
1 TABLET ORAL EVERY 6 HOURS PRN
Qty: 20 TABLET | Refills: 0 | Status: SHIPPED | OUTPATIENT
Start: 2019-07-10 | End: 2019-07-25

## 2019-07-10 RX ORDER — FAMOTIDINE 10 MG/ML
20 INJECTION INTRAVENOUS ONCE
Status: COMPLETED | OUTPATIENT
Start: 2019-07-10 | End: 2019-07-10

## 2019-07-10 RX ORDER — LORAZEPAM 2 MG/ML
0.5 INJECTION INTRAMUSCULAR ONCE
Status: COMPLETED | OUTPATIENT
Start: 2019-07-10 | End: 2019-07-10

## 2019-07-10 RX ORDER — ROCURONIUM BROMIDE 10 MG/ML
INJECTION, SOLUTION INTRAVENOUS
Status: DISCONTINUED | OUTPATIENT
Start: 2019-07-10 | End: 2019-07-10

## 2019-07-10 RX ORDER — SODIUM CHLORIDE, SODIUM LACTATE, POTASSIUM CHLORIDE, CALCIUM CHLORIDE 600; 310; 30; 20 MG/100ML; MG/100ML; MG/100ML; MG/100ML
500 INJECTION, SOLUTION INTRAVENOUS ONCE
Status: DISCONTINUED | OUTPATIENT
Start: 2019-07-10 | End: 2019-09-13

## 2019-07-10 RX ORDER — LIDOCAINE HYDROCHLORIDE 10 MG/ML
1 INJECTION, SOLUTION EPIDURAL; INFILTRATION; INTRACAUDAL; PERINEURAL ONCE
Status: DISCONTINUED | OUTPATIENT
Start: 2019-07-10 | End: 2019-07-10 | Stop reason: HOSPADM

## 2019-07-10 RX ORDER — ONDANSETRON 2 MG/ML
4 INJECTION INTRAMUSCULAR; INTRAVENOUS EVERY 12 HOURS PRN
Status: DISCONTINUED | OUTPATIENT
Start: 2019-07-10 | End: 2019-07-10 | Stop reason: HOSPADM

## 2019-07-10 RX ORDER — ACETAMINOPHEN 10 MG/ML
INJECTION, SOLUTION INTRAVENOUS
Status: DISCONTINUED | OUTPATIENT
Start: 2019-07-10 | End: 2019-07-10

## 2019-07-10 RX ORDER — SCOLOPAMINE TRANSDERMAL SYSTEM 1 MG/1
1 PATCH, EXTENDED RELEASE TRANSDERMAL
Status: DISCONTINUED | OUTPATIENT
Start: 2019-07-10 | End: 2019-07-10 | Stop reason: HOSPADM

## 2019-07-10 RX ORDER — NEOSTIGMINE METHYLSULFATE 1 MG/ML
INJECTION, SOLUTION INTRAVENOUS
Status: DISCONTINUED | OUTPATIENT
Start: 2019-07-10 | End: 2019-07-10

## 2019-07-10 RX ORDER — BUPIVACAINE HYDROCHLORIDE AND EPINEPHRINE 2.5; 5 MG/ML; UG/ML
INJECTION, SOLUTION EPIDURAL; INFILTRATION; INTRACAUDAL; PERINEURAL
Status: DISCONTINUED | OUTPATIENT
Start: 2019-07-10 | End: 2019-07-10 | Stop reason: HOSPADM

## 2019-07-10 RX ORDER — ONDANSETRON 2 MG/ML
INJECTION INTRAMUSCULAR; INTRAVENOUS
Status: DISCONTINUED | OUTPATIENT
Start: 2019-07-10 | End: 2019-07-10

## 2019-07-10 RX ORDER — CEFTRIAXONE 2 G/50ML
2 INJECTION, SOLUTION INTRAVENOUS
Status: COMPLETED | OUTPATIENT
Start: 2019-07-10 | End: 2019-07-10

## 2019-07-10 RX ORDER — FENTANYL CITRATE 50 UG/ML
25 INJECTION, SOLUTION INTRAMUSCULAR; INTRAVENOUS EVERY 5 MIN PRN
Status: COMPLETED | OUTPATIENT
Start: 2019-07-10 | End: 2019-07-10

## 2019-07-10 RX ORDER — SODIUM CHLORIDE 9 MG/ML
INJECTION, SOLUTION INTRAVENOUS CONTINUOUS
Status: DISCONTINUED | OUTPATIENT
Start: 2019-07-10 | End: 2019-07-10 | Stop reason: HOSPADM

## 2019-07-10 RX ORDER — FENTANYL CITRATE 50 UG/ML
INJECTION, SOLUTION INTRAMUSCULAR; INTRAVENOUS
Status: DISCONTINUED | OUTPATIENT
Start: 2019-07-10 | End: 2019-07-10

## 2019-07-10 RX ORDER — PROPOFOL 10 MG/ML
VIAL (ML) INTRAVENOUS
Status: DISCONTINUED | OUTPATIENT
Start: 2019-07-10 | End: 2019-07-10

## 2019-07-10 RX ORDER — HYDROCODONE BITARTRATE AND ACETAMINOPHEN 5; 325 MG/1; MG/1
1 TABLET ORAL EVERY 4 HOURS PRN
Status: DISCONTINUED | OUTPATIENT
Start: 2019-07-10 | End: 2019-07-10 | Stop reason: HOSPADM

## 2019-07-10 RX ORDER — METRONIDAZOLE 500 MG/100ML
500 INJECTION, SOLUTION INTRAVENOUS
Status: COMPLETED | OUTPATIENT
Start: 2019-07-10 | End: 2019-07-10

## 2019-07-10 RX ORDER — KETAMINE HYDROCHLORIDE 100 MG/ML
INJECTION, SOLUTION INTRAMUSCULAR; INTRAVENOUS
Status: DISCONTINUED | OUTPATIENT
Start: 2019-07-10 | End: 2019-07-10

## 2019-07-10 RX ADMIN — METRONIDAZOLE 500 MG: 500 INJECTION, SOLUTION INTRAVENOUS at 10:07

## 2019-07-10 RX ADMIN — KETAMINE HYDROCHLORIDE 25 MG: 100 INJECTION, SOLUTION, CONCENTRATE INTRAMUSCULAR; INTRAVENOUS at 10:07

## 2019-07-10 RX ADMIN — LIDOCAINE HYDROCHLORIDE 75 MG: 20 INJECTION PARENTERAL at 11:07

## 2019-07-10 RX ADMIN — DEXAMETHASONE SODIUM PHOSPHATE 8 MG: 4 INJECTION, SOLUTION INTRAMUSCULAR; INTRAVENOUS at 10:07

## 2019-07-10 RX ADMIN — SODIUM CHLORIDE, SODIUM LACTATE, POTASSIUM CHLORIDE, AND CALCIUM CHLORIDE: .6; .31; .03; .02 INJECTION, SOLUTION INTRAVENOUS at 10:07

## 2019-07-10 RX ADMIN — ACETAMINOPHEN 1000 MG: 10 INJECTION, SOLUTION INTRAVENOUS at 11:07

## 2019-07-10 RX ADMIN — FENTANYL CITRATE 25 MCG: 50 INJECTION INTRAMUSCULAR; INTRAVENOUS at 12:07

## 2019-07-10 RX ADMIN — NEOSTIGMINE METHYLSULFATE 5 MG: 1 INJECTION INTRAVENOUS at 11:07

## 2019-07-10 RX ADMIN — OXYCODONE HYDROCHLORIDE 5 MG: 5 TABLET ORAL at 12:07

## 2019-07-10 RX ADMIN — FENTANYL CITRATE 50 MCG: 50 INJECTION, SOLUTION INTRAMUSCULAR; INTRAVENOUS at 11:07

## 2019-07-10 RX ADMIN — LORAZEPAM 0.5 MG: 2 INJECTION, SOLUTION INTRAMUSCULAR; INTRAVENOUS at 12:07

## 2019-07-10 RX ADMIN — CEFTRIAXONE SODIUM 2 G: 2 INJECTION, SOLUTION INTRAVENOUS at 11:07

## 2019-07-10 RX ADMIN — MIDAZOLAM HYDROCHLORIDE 2 MG: 1 INJECTION, SOLUTION INTRAMUSCULAR; INTRAVENOUS at 10:07

## 2019-07-10 RX ADMIN — GLYCOPYRROLATE 0.2 MG: 0.2 INJECTION, SOLUTION INTRAMUSCULAR; INTRAVENOUS at 11:07

## 2019-07-10 RX ADMIN — ROCURONIUM BROMIDE 40 MG: 10 INJECTION, SOLUTION INTRAVENOUS at 11:07

## 2019-07-10 RX ADMIN — SCOPALAMINE 1 PATCH: 1 PATCH, EXTENDED RELEASE TRANSDERMAL at 10:07

## 2019-07-10 RX ADMIN — SODIUM CHLORIDE, SODIUM LACTATE, POTASSIUM CHLORIDE, AND CALCIUM CHLORIDE 10 ML/HR: .6; .31; .03; .02 INJECTION, SOLUTION INTRAVENOUS at 10:07

## 2019-07-10 RX ADMIN — GLYCOPYRROLATE 0.6 MG: 0.2 INJECTION, SOLUTION INTRAMUSCULAR; INTRAVENOUS at 11:07

## 2019-07-10 RX ADMIN — PROPOFOL 200 MG: 10 INJECTION, EMULSION INTRAVENOUS at 11:07

## 2019-07-10 RX ADMIN — FENTANYL CITRATE: 50 INJECTION INTRAMUSCULAR; INTRAVENOUS at 12:07

## 2019-07-10 RX ADMIN — FENTANYL CITRATE 50 MCG: 50 INJECTION, SOLUTION INTRAMUSCULAR; INTRAVENOUS at 10:07

## 2019-07-10 RX ADMIN — ONDANSETRON 4 MG: 2 INJECTION, SOLUTION INTRAMUSCULAR; INTRAVENOUS at 11:07

## 2019-07-10 RX ADMIN — KETOROLAC TROMETHAMINE 30 MG: 30 INJECTION, SOLUTION INTRAMUSCULAR; INTRAVENOUS at 11:07

## 2019-07-10 RX ADMIN — ROCURONIUM BROMIDE 10 MG: 10 INJECTION, SOLUTION INTRAVENOUS at 11:07

## 2019-07-10 RX ADMIN — FAMOTIDINE 20 MG: 10 INJECTION, SOLUTION INTRAVENOUS at 10:07

## 2019-07-10 NOTE — BRIEF OP NOTE
[facility]  Brief Operative Note     SUMMARY     Surgery Date: 7/10/2019     Surgeon(s) and Role:     * Carlos Alberto Baker MD - Primary    Assisting Surgeon: None    Pre-op Diagnosis:  Biliary colic [K80.50]    Post-op Diagnosis:  Post-Op Diagnosis Codes:     * Biliary colic [K80.50]    Procedure(s) (LRB):  CHOLECYSTECTOMY, LAPAROSCOPIC (N/A)    Anesthesia: General    Description of the findings of the procedure: Normal anatomy.  Distended gallbladder.       Findings/Key Components: see above.      Estimated Blood Loss: 10 cc's       Specimens:   Specimen (12h ago, onward)    Start     Ordered    07/10/19 1112  Specimen to Pathology - Surgery  Once     Comments:  Pre-op Diagnosis: Biliary colic [K80.50]Post-op Diagnosis: unknownProcedure(s):CHOLECYSTECTOMY, LAPAROSCOPIC Number of specimens: 1Name of specimens: 1. gallbladder     Start Status     07/10/19 1112 Needs to be Collected Order ID: [id number]       07/10/19 1112          Discharge Note    SUMMARY     Admit Date: 7/10/2019    Discharge Date and Time:  07/10/2019 11:41 AM    Hospital Course (synopsis of major diagnoses, care, treatment, and services provided during the course of the hospital stay): Pt presented for lap darline.  Procedure and post op course were without event and pt was discharged to home.         Final Diagnosis: Post-Op Diagnosis Codes:     * Biliary colic [K80.50]    Disposition: Home or Self Care    Follow Up/Patient Instructions:     Medications:  Reconciled Home Medications:      Medication List      START taking these medications    HYDROcodone-acetaminophen 5-325 mg per tablet  Commonly known as:  NORCO  Take 1 tablet by mouth every 6 (six) hours as needed for Pain.        ASK your doctor about these medications    ondansetron 4 MG Tbdl  Commonly known as:  ZOFRAN-ODT  Take 2 tablets (8 mg total) by mouth every 8 (eight) hours as needed.     pantoprazole 20 MG tablet  Commonly known as:  PROTONIX  TAKE 1 TABLET(20 MG)  BY MOUTH EVERY DAY     TRI-LO-SPRINTEC 0.18/0.215/0.25 mg-25 mcg tablet  Generic drug:  norgestimate-ethinyl estradiol  TAKE ONE TABLET BY MOUTH EVERY DAY          Discharge Procedure Orders   Diet general     Call MD for:  extreme fatigue     Call MD for:  persistent dizziness or light-headedness     Call MD for:  hives     Call MD for:  redness, tenderness, or signs of infection (pain, swelling, redness, odor or green/yellow discharge around incision site)     Call MD for:  difficulty breathing, headache or visual disturbances     Call MD for:  persistent nausea and vomiting     Call MD for:  temperature >100.4     Call MD for:  severe uncontrolled pain     Remove dressing in 48 hours     Activity as tolerated     Follow-up Information     Carlos Alberto Baker MD.    Specialties:  General Surgery, Colon and Rectal Surgery, Surgery  Contact information:  [address] [facility] [address] [place] [address] [phone]

## 2019-07-10 NOTE — H&P
HPI  Pleasant 24 yo F referred to me for evaluation of biliary colic.  Pt seen in October by Dr Osborn for evaluation of biliary colic. Pt notes that she was not ready to proceed with surgery at that time.  Pt notes that she has been having episodes of pain over the last few months.  She reports an attack earlier this week that was particularly painful.  Pt reports RUQ pain and tenderness extending into her epigastric region with these attacks.  Her last one woke her from her sleep and lasted throughout the day the next day.  In association with pain she is having n/v.  No fever/chills.  Pt notes that she is otherwise healthy.  No other significant PSHx  Review of Systems   Constitutional: Negative for activity change, appetite change, fever and unexpected weight change.   HENT: Negative for congestion and mouth sores.    Respiratory: Negative for chest tightness, shortness of breath and wheezing.    Cardiovascular: Negative for chest pain.   Gastrointestinal: Positive for abdominal pain and nausea. Negative for abdominal distention, anal bleeding, blood in stool, constipation, diarrhea, rectal pain and vomiting.   Genitourinary: Negative for difficulty urinating, dysuria and frequency.   Skin: Negative for color change and wound.   Neurological: Negative for dizziness.   Hematological: Negative for adenopathy. Does not bruise/bleed easily.   Psychiatric/Behavioral: Negative for agitation and decreased concentration.       Objective:   Physical Exam   Constitutional: She is oriented to person, place, and time. She appears well-developed. No distress.   HENT:   Head: Normocephalic and atraumatic.   Eyes: Pupils are equal, round, and reactive to light.   Neck: Normal range of motion. Neck supple. No tracheal deviation present. No thyromegaly present.   Cardiovascular: Normal rate, regular rhythm and normal heart sounds.   No murmur heard.  Pulmonary/Chest: Effort normal and breath sounds normal. No respiratory  distress. She exhibits no tenderness.   Abdominal: Soft. Bowel sounds are normal. She exhibits no distension, no abdominal bruit, no pulsatile midline mass and no mass. There is no hepatosplenomegaly. There is no tenderness. There is no rigidity, no rebound, no guarding, no tenderness at McBurney's point and negative Mccormick's sign. No hernia. Hernia confirmed negative in the ventral area.   Genitourinary: Rectum normal.   Musculoskeletal: Normal range of motion. She exhibits no edema, tenderness or deformity.   Lymphadenopathy:     She has no cervical adenopathy.   Neurological: She is alert and oriented to person, place, and time.   Skin: Skin is warm. No rash noted. She is not diaphoretic. No erythema. No pallor.   Psychiatric: She has a normal mood and affect. Her behavior is normal.   Vitals reviewed.         US at outside facility demonstrating sludge of GB  Assessment:     Biliary sludge  No diagnosis found.    Plan:       D/w pt.  I have reviewed with her that her pain may be related to sludge in her GB and as such I have offered lap darline. Pt desires to proceed with surgery today

## 2019-07-10 NOTE — TRANSFER OF CARE
"Anesthesia Transfer of Care Note    Patient: Maureen Barrera    Procedure(s) Performed: Procedure(s) (LRB):  CHOLECYSTECTOMY, LAPAROSCOPIC (N/A)    Patient location: PACU    Anesthesia Type: general    Transport from OR: Transported from OR on room air with adequate spontaneous ventilation    Post pain: adequate analgesia    Post assessment: no apparent anesthetic complications and tolerated procedure well    Post vital signs: stable    Level of consciousness: awake    Nausea/Vomiting: no nausea/vomiting    Complications: none    Transfer of care protocol was followed      Last vitals:   Visit Vitals  /60 (BP Location: Right arm, Patient Position: Lying)   Pulse 90   Temp 36.4 °C (97.5 °F) (Temporal)   Resp 16   Ht 5' 3" (1.6 m)   Wt 85 kg (187 lb 6.3 oz)   LMP 07/02/2019   SpO2 100%   Breastfeeding? No   BMI 33.19 kg/m²     "

## 2019-07-10 NOTE — ANESTHESIA PREPROCEDURE EVALUATION
07/10/2019  Maureen Barrera is a 23 y.o., female.    Anesthesia Evaluation    I have reviewed the Patient Summary Reports.    I have reviewed the Nursing Notes.   I have reviewed the Medications.     Review of Systems  Anesthesia Hx:  PONV   Hepatic/GI:   GERD        Physical Exam  General:  Obesity    Airway/Jaw/Neck:  Airway Findings: Mouth Opening: Normal Tongue: Normal  General Airway Assessment: Adult  Mallampati: II  Improves to II with phonation.  Jaw/Neck Findings:  Neck ROM: Normal ROM      Dental:  Dental Findings: In tact        Mental Status:  Mental Status Findings:  Cooperative, Alert and Oriented         Anesthesia Plan  Type of Anesthesia, risks & benefits discussed:  Anesthesia Type:  general  Patient's Preference:   Intra-op Monitoring Plan: standard ASA monitors  Intra-op Monitoring Plan Comments:   Post Op Pain Control Plan: multimodal analgesia  Post Op Pain Control Plan Comments:   Induction:   IV and Inhalation  Beta Blocker:  Patient is not currently on a Beta-Blocker (No further documentation required).       Informed Consent: Patient understands risks and agrees with Anesthesia plan.  Questions answered. Anesthesia consent signed with patient.  ASA Score: 2     Day of Surgery Review of History & Physical:            Ready For Surgery From Anesthesia Perspective.

## 2019-07-10 NOTE — ANESTHESIA POSTPROCEDURE EVALUATION
Anesthesia Post Evaluation    Patient: Maureen Barrera    Procedure(s) Performed: Procedure(s) (LRB):  CHOLECYSTECTOMY, LAPAROSCOPIC (N/A)    Final Anesthesia Type: general  Patient location during evaluation: PACU  Patient participation: Yes- Able to Participate  Level of consciousness: sedated and awake  Post-procedure vital signs: reviewed and stable  Pain management: adequate  Airway patency: patent  PONV status at discharge: No PONV  Anesthetic complications: no      Cardiovascular status: blood pressure returned to baseline  Respiratory status: spontaneous ventilation  Hydration status: euvolemic  Follow-up not needed.          Vitals Value Taken Time   /80 7/10/2019 12:00 PM   Temp 36.2 °C (97.2 °F) 7/10/2019 12:00 PM   Pulse 79 7/10/2019 12:00 PM   Resp 16 7/10/2019 12:00 PM   SpO2 100 % 7/10/2019 12:00 PM         No case tracking events are documented in the log.      Pain/Reinier Score: Pain Rating Prior to Med Admin: 10 (7/10/2019 12:08 PM)  Reinier Score: 10 (7/10/2019 12:00 PM)

## 2019-07-10 NOTE — DISCHARGE INSTRUCTIONS
Department of General Surgery  [facility]  LAPAROSCOPIC CHOLECYSTECTOMY    DOS:   Minimal activity for 48 hours   Regular diet as tolerated   May shower in 48 hours   May remove outer dressing in 48 hours. Leave ster-strips in place. If steri-strips get wet, pat dry. If they fall off, do not worry.   Resume home medications as prescribed.    DONT:   Do not lift anything over 15 pounds until you have been released by your physician.   Do not soak in tub   No driving for 24 hours or while taking narcotic pain medication.   DO NOT TAKE ADDITIONAL TYLENOL/ACETAMINOPHEN WHILE TAKING NARCOTIC PAIN MEDICINE THAT CONTAINS TYLENOL/ACETAMINOPHEN.    CALL PHYSICIAN FOR:   Redness, swelling, or bleeding from surgical site   Fever greater then 101.   Drainage from the incision site that appears infected.   Persistent pain not relieved by pain medication.    RETURN APPOINTMENT: Call to schedule appointment in 10-14 days    FOR EMERGENCIES: Contact  Your doctor at [phone]          Discharge Instructions: After Your Surgery  Youve just had surgery. During surgery, you were given medicine called anesthesia to keep you relaxed and free of pain. After surgery, you may have some pain or nausea. This is common. Here are some tips for feeling better and getting well after surgery.     Stay on schedule with your medicine.   Going home  Your healthcare provider will show you how to take care of yourself when you go home. He or she will also answer your questions. Have an adult family member or friend drive you home. For the first 24 hours after your surgery:  · Do not drive or use heavy equipment.  · Do not make important decisions or sign legal papers.  · Do not drink alcohol.  · Have someone stay with you, if needed. He or she can watch for problems and help keep you safe.  Be sure to go to all follow-up visits with your healthcare provider. And rest after your surgery for as long as your healthcare  provider tells you to.  Coping with pain  If you have pain after surgery, pain medicine will help you feel better. Take it as told, before pain becomes severe. Also, ask your healthcare provider or pharmacist about other ways to control pain. This might be with heat, ice, or relaxation. And follow any other instructions your surgeon or nurse gives you.  Tips for taking pain medicine  To get the best relief possible, remember these points:  · Pain medicines can upset your stomach. Taking them with a little food may help.  · Most pain relievers taken by mouth need at least 20 to 30 minutes to start to work.  · Taking medicine on a schedule can help you remember to take it. Try to time your medicine so that you can take it before starting an activity. This might be before you get dressed, go for a walk, or sit down for dinner.  · Constipation is a common side effect of pain medicines. Call your healthcare provider before taking any medicines such as laxatives or stool softeners to help ease constipation. Also ask if you should skip any foods. Drinking lots of fluids and eating foods such as fruits and vegetables that are high in fiber can also help. Remember, do not take laxatives unless your surgeon has prescribed them.  · Drinking alcohol and taking pain medicine can cause dizziness and slow your breathing. It can even be deadly. Do not drink alcohol while taking pain medicine.  · Pain medicine can make you react more slowly to things. Do not drive or run machinery while taking pain medicine.  Your healthcare provider may tell you to take acetaminophen to help ease your pain. Ask him or her how much you are supposed to take each day. Acetaminophen or other pain relievers may interact with your prescription medicines or other over-the-counter (OTC) medicines. Some prescription medicines have acetaminophen and other ingredients. Using both prescription and OTC acetaminophen for pain can cause you to overdose. Read the  labels on your OTC medicines with care. This will help you to clearly know the list of ingredients, how much to take, and any warnings. It may also help you not take too much acetaminophen. If you have questions or do not understand the information, ask your pharmacist or healthcare provider to explain it to you before you take the OTC medicine.  Managing nausea  Some people have an upset stomach after surgery. This is often because of anesthesia, pain, or pain medicine, or the stress of surgery. These tips will help you handle nausea and eat healthy foods as you get better. If you were on a special food plan before surgery, ask your healthcare provider if you should follow it while you get better. These tips may help:  · Do not push yourself to eat. Your body will tell you when to eat and how much.  · Start off with clear liquids and soup. They are easier to digest.  · Next try semi-solid foods, such as mashed potatoes, applesauce, and gelatin, as you feel ready.  · Slowly move to solid foods. Dont eat fatty, rich, or spicy foods at first.  · Do not force yourself to have 3 large meals a day. Instead eat smaller amounts more often.  · Take pain medicines with a small amount of solid food, such as crackers or toast, to avoid nausea.     Call your surgeon if  · You still have pain an hour after taking medicine. The medicine may not be strong enough.  · You feel too sleepy, dizzy, or groggy. The medicine may be too strong.  · You have side effects like nausea, vomiting, or skin changes, such as rash, itching, or hives.       If you have obstructive sleep apnea  You were given anesthesia medicine during surgery to keep you comfortable and free of pain. After surgery, you may have more apnea spells because of this medicine and other medicines you were given. The spells may last longer than usual.   At home:  · Keep using the continuous positive airway pressure (CPAP) device when you sleep. Unless your healthcare  provider tells you not to, use it when you sleep, day or night. CPAP is a common device used to treat obstructive sleep apnea.  · Talk with your provider before taking any pain medicine, muscle relaxants, or sedatives. Your provider will tell you about the possible dangers of taking these medicines.  Date Last Reviewed: 12/1/2016  © 7711-9401 [employer]. [address] [place] [address], [place] [address]. All rights reserved. This information is not intended as a substitute for professional medical care. Always follow your healthcare professional's instructions.

## 2019-07-11 ENCOUNTER — PATIENT OUTREACH (OUTPATIENT)
Dept: ADMINISTRATIVE | Facility: HOSPITAL | Age: 24
End: 2019-07-11

## 2019-07-11 NOTE — PROGRESS NOTES
Health Maintenance Due   Topic Date Due    CHLAMYDIA SCREENING  10/24/2017       Chart review completed 07/11/2019

## 2019-07-11 NOTE — OP NOTE
DATE OF PROCEDURE:  07/10/2019    STAFF SURGEON:  Carlos Alberto Baker M.D.    PREOPERATIVE DIAGNOSIS:  Biliary colic.    POSTOPERATIVE DIAGNOSIS:  Biliary colic.    PROCEDURE:  Laparoscopic cholecystectomy.    ANESTHESIA:  General endotracheal anesthesia.    INDICATION FOR PROCEDURE:  This is a pleasant 23-year-old female who presented   to my office with signs and symptoms suggestive of biliary colic and is   scheduled for laparoscopic cholecystectomy on the above-mentioned date.    DESCRIPTION OF PROCEDURE:  Following signing of informed consent, she received   preoperative IV antibiotics, taken to the OR and placed in supine position.    SCDs were placed.  General endotracheal anesthesia was administered without   event.  Abdomen was prepped and draped in standard fashion and appropriate   timeout procedure was then performed.  I made a small transverse incision a few   centimeters above the umbilicus.  I went more superior than usual as the patient   had recent piercing done that prevented my standard incision.  I proceed down to deep dermal tissue, dissected the subcutaneous space down to the fascia.  I entered the abdominal cavity with a   Veress needle and established pneumoperitoneum to 15 mmHg. I then entered the   abdominal cavity with an Optiview trocar under direct visualization.  I   evaluated for injury from the Veress needle.  No such injury was appreciated.    The patient was placed in reverse Trendelenburg and tilt towards the left side.    I placed an epigastric trocar and two in the right subcostal margin, all under   direct visualization after injection of 0.25% Marcaine mixed with epinephrine.    I grasped the fundus of the gallbladder with the most lateral trocar, grasped   the infundibulum with the middle trocar and held towards the patient's feet.    These maneuvers exposed the triangle of Calot, which was dissected identifying   the cystic duct and cystic artery in their usual anatomic locations.   There was   minimal inflammation along the gallbladder.  I placed two clips distally on the   cystic duct and one clip proximally; two clips were placed on the cystic artery.    I cut between clips and used hook cautery to remove the gallbladder from the   hepatic fossa.  I removed it from abdominal cavity with the assistance of an   EndoCatch bag.  I then reinserted the scope and evaluated the hepatic fossa,   ensuring adequate hemostasis.  Having ensured adequate hemostasis, I removed all   trocars under direct visualization and closed the umbilical fascia with #0   Vicryl suture.  Skin incision was closed with 4-0 Monocryl.  The patient was   then awakened, extubated and taken to the recovery room in stable condition.    There were no immediate complications.  Blood loss was 25 mL.        THOMAS  dd: 07/10/2019 13:14:28 (CDT)  td: 07/10/2019 13:58:10 (SOMMER)  Doc ID   #[id number]  Job ID #[id number]    CC:

## 2019-07-12 RX ORDER — HYDROXYZINE HYDROCHLORIDE 10 MG/1
25 TABLET, FILM COATED ORAL 3 TIMES DAILY PRN
Qty: 10 TABLET | Refills: 0 | Status: CANCELLED | OUTPATIENT
Start: 2019-07-12

## 2019-07-18 ENCOUNTER — OFFICE VISIT (OUTPATIENT)
Dept: SURGERY | Facility: CLINIC | Age: 24
End: 2019-07-18
Payer: COMMERCIAL

## 2019-07-18 VITALS — TEMPERATURE: 100 F | WEIGHT: 177 LBS | BODY MASS INDEX: 31.36 KG/M2 | HEIGHT: 63 IN

## 2019-07-18 DIAGNOSIS — Z09 POSTOP CHECK: Primary | ICD-10-CM

## 2019-07-18 PROBLEM — K80.50 BILIARY COLIC: Status: RESOLVED | Noted: 2019-07-10 | Resolved: 2019-07-18

## 2019-07-18 PROCEDURE — 99999 PR PBB SHADOW E&M-EST. PATIENT-LVL III: ICD-10-PCS | Mod: PBBFAC,,, | Performed by: SURGERY

## 2019-07-18 PROCEDURE — 99024 PR POST-OP FOLLOW-UP VISIT: ICD-10-PCS | Mod: S$GLB,,, | Performed by: SURGERY

## 2019-07-18 PROCEDURE — 99999 PR PBB SHADOW E&M-EST. PATIENT-LVL III: CPT | Mod: PBBFAC,,, | Performed by: SURGERY

## 2019-07-18 PROCEDURE — 99024 POSTOP FOLLOW-UP VISIT: CPT | Mod: S$GLB,,, | Performed by: SURGERY

## 2019-07-18 RX ORDER — HYDROXYZINE HYDROCHLORIDE 10 MG/1
1 TABLET, FILM COATED ORAL NIGHTLY
Refills: 0 | COMMUNITY
Start: 2019-07-12 | End: 2019-07-25 | Stop reason: SDUPTHER

## 2019-07-19 NOTE — PROGRESS NOTES
Cc: post op    HPI: 23 y.o.  female  2 weeks s/p lap darline.   Pt notes that she is feeling well.  No pain.      PE: AFVSS    AAOx3  CTA  Soft/NT/nd  Inc: c/d/i        Path:   Chronic cholecystitis    A/P:   Pt doing well post surgery.   F/U with me prn

## 2019-07-25 ENCOUNTER — OFFICE VISIT (OUTPATIENT)
Dept: FAMILY MEDICINE | Facility: CLINIC | Age: 24
End: 2019-07-25
Payer: COMMERCIAL

## 2019-07-25 VITALS
DIASTOLIC BLOOD PRESSURE: 70 MMHG | RESPIRATION RATE: 18 BRPM | SYSTOLIC BLOOD PRESSURE: 110 MMHG | TEMPERATURE: 98 F | WEIGHT: 176.63 LBS | HEIGHT: 63 IN | BODY MASS INDEX: 31.3 KG/M2 | OXYGEN SATURATION: 98 % | HEART RATE: 85 BPM

## 2019-07-25 DIAGNOSIS — Z11.8 SCREENING FOR CHLAMYDIAL DISEASE: ICD-10-CM

## 2019-07-25 DIAGNOSIS — Z00.00 LABORATORY EXAM ORDERED AS PART OF ROUTINE GENERAL MEDICAL EXAMINATION: ICD-10-CM

## 2019-07-25 DIAGNOSIS — G47.9 SLEEP DISTURBANCE: ICD-10-CM

## 2019-07-25 DIAGNOSIS — Z00.00 ANNUAL PHYSICAL EXAM: Primary | ICD-10-CM

## 2019-07-25 DIAGNOSIS — F41.9 ANXIETY: ICD-10-CM

## 2019-07-25 PROCEDURE — 99395 PREV VISIT EST AGE 18-39: CPT | Mod: S$GLB,,, | Performed by: NURSE PRACTITIONER

## 2019-07-25 PROCEDURE — 99395 PR PREVENTIVE VISIT,EST,18-39: ICD-10-PCS | Mod: S$GLB,,, | Performed by: NURSE PRACTITIONER

## 2019-07-25 PROCEDURE — 87491 CHLMYD TRACH DNA AMP PROBE: CPT

## 2019-07-25 RX ORDER — HYDROXYZINE HYDROCHLORIDE 25 MG/1
25 TABLET, FILM COATED ORAL NIGHTLY
Qty: 30 TABLET | Refills: 4 | Status: SHIPPED | OUTPATIENT
Start: 2019-07-25 | End: 2021-08-04

## 2019-07-25 NOTE — PROGRESS NOTES
Subjective:       Patient ID: Maureen Barrera is a 23 y.o. female.    Chief Complaint: Annual Exam (Physical )    HPI here for annual exam.     She is recovering from having her gallbladder removed 10 days ago. Doing well from that. Eating well. Hydrating well. Some mild nausea at times but nothing concerning. She will take zofran as needed.    She is doing well with Hydroxyzine for sleep and anxiety. States most of her anxiety comes at bedtime and this prevents her from resting. The hydroxyzine helps her relax and sleep without feeling hung over.     She would like to have labs done. She is due for chlamydia screening.     She has no other concerns. See ROS.    The following portion of the patients history was reviewed and updated as appropriate: allergies, current medications, past medical and surgical history. Past social history and problem list reviewed. Family PMH and Past social history reviewed. Tobacco, Illicit drug use reviewed.     Review of Systems   Constitutional: Negative for activity change, appetite change, fatigue, fever and unexpected weight change.   HENT: Negative for congestion, sneezing, sore throat, trouble swallowing and voice change.    Eyes: Negative for itching and visual disturbance.   Respiratory: Negative for cough, chest tightness, shortness of breath and wheezing.    Cardiovascular: Negative for chest pain, palpitations and leg swelling.   Gastrointestinal: Negative for abdominal pain, blood in stool, constipation, diarrhea, nausea and vomiting.   Endocrine: Negative for cold intolerance, heat intolerance and polyuria.   Genitourinary: Negative for difficulty urinating, dysuria, pelvic pain and vaginal pain.   Musculoskeletal: Negative for arthralgias, back pain, gait problem and myalgias.   Skin: Negative for rash and wound.   Allergic/Immunologic: Negative for environmental allergies, food allergies and immunocompromised state.   Neurological: Negative for dizziness, weakness  "and headaches.   Hematological: Negative for adenopathy. Does not bruise/bleed easily.   Psychiatric/Behavioral: Positive for sleep disturbance (takes atarax prn). Negative for behavioral problems, decreased concentration and dysphoric mood. The patient is nervous/anxious (atarax helps. ).        Objective:     /70   Pulse 85   Temp 98.1 °F (36.7 °C) (Oral)   Resp 18   Ht 5' 3" (1.6 m)   Wt 80.1 kg (176 lb 9.6 oz)   LMP 07/02/2019   SpO2 98%   BMI 31.28 kg/m²      Physical Exam   Constitutional: She is oriented to person, place, and time. She appears well-developed and well-nourished. No distress.   HENT:   Head: Normocephalic.   Eyes: Pupils are equal, round, and reactive to light. Conjunctivae and EOM are normal.   Neck: Trachea normal and normal range of motion. Neck supple. No tracheal tenderness present. No tracheal deviation and no edema present. No thyromegaly present.   Cardiovascular: Regular rhythm and normal heart sounds. Exam reveals no gallop.   No murmur heard.  Pulmonary/Chest: Breath sounds normal. No stridor. No respiratory distress. She has no wheezes. She has no rhonchi. She has no rales.   Abdominal: Soft. Normal appearance and bowel sounds are normal. She exhibits no mass. There is no splenomegaly or hepatomegaly. There is no tenderness. There is no rebound.   Musculoskeletal: Normal range of motion.   Gait and coordination normal. Upper and lower extremity strength WNL.    Neurological: She is alert and oriented to person, place, and time. She has normal strength.   Skin: Skin is warm and dry. Capillary refill takes less than 2 seconds. No lesion and no rash noted.   Abdominal incisions from lap darline healing well.    Psychiatric: She has a normal mood and affect. Her speech is normal and behavior is normal. Judgment and thought content normal. Cognition and memory are normal.       Assessment:       1. Annual physical exam    2. Screening for chlamydial disease    3. Laboratory " exam ordered as part of routine general medical examination    4. Anxiety    5. Sleep disturbance        Plan:         Maureen was seen today for annual exam.    Diagnoses and all orders for this visit:    Annual physical exam    Screening for chlamydial disease  -     C. trachomatis/N. gonorrhoeae by AMP DNA [facility]; Urine    Laboratory exam ordered as part of routine general medical examination  -     CBC auto differential; Future  -     Comprehensive metabolic panel; Future  -     Lipid panel; Future  -      Anxiety: well controlled with prn atarax.     Sleep disturbance: well controlled with PRN atarax. Take as prescribed.    Other orders  -     hydrOXYzine HCl (ATARAX) 25 MG tablet; Take 1 tablet (25 mg total) by mouth nightly.    Continue current medication  Take medications only as prescribed  Healthy diet, exercise  Adequate rest  Adequate hydration  Avoid allergens  Avoid excessive caffeine

## 2019-07-26 ENCOUNTER — PATIENT MESSAGE (OUTPATIENT)
Dept: FAMILY MEDICINE | Facility: CLINIC | Age: 24
End: 2019-07-26

## 2019-07-26 LAB
ALBUMIN SERPL-MCNC: 4.7 G/DL (ref 3.5–5.5)
ALBUMIN/GLOB SERPL: 1.6 {RATIO} (ref 1.2–2.2)
ALP SERPL-CCNC: 85 IU/L (ref 39–117)
ALT SERPL-CCNC: 153 IU/L (ref 0–32)
AST SERPL-CCNC: 59 IU/L (ref 0–40)
BASOPHILS # BLD AUTO: 0.1 X10E3/UL (ref 0–0.2)
BASOPHILS NFR BLD AUTO: 1 %
BILIRUB SERPL-MCNC: 0.3 MG/DL (ref 0–1.2)
BUN SERPL-MCNC: 11 MG/DL (ref 6–20)
BUN/CREAT SERPL: 14 (ref 9–23)
C TRACH DNA SPEC QL NAA+PROBE: NOT DETECTED
CALCIUM SERPL-MCNC: 9.6 MG/DL (ref 8.7–10.2)
CHLORIDE SERPL-SCNC: 105 MMOL/L (ref 96–106)
CHOLEST SERPL-MCNC: 229 MG/DL (ref 100–199)
CO2 SERPL-SCNC: 23 MMOL/L (ref 20–29)
CREAT SERPL-MCNC: 0.76 MG/DL (ref 0.57–1)
EOSINOPHIL # BLD AUTO: 0.2 X10E3/UL (ref 0–0.4)
EOSINOPHIL NFR BLD AUTO: 3 %
ERYTHROCYTE [DISTWIDTH] IN BLOOD BY AUTOMATED COUNT: 14.5 % (ref 12.3–15.4)
GLOBULIN SER CALC-MCNC: 2.9 G/DL (ref 1.5–4.5)
GLUCOSE SERPL-MCNC: 90 MG/DL (ref 65–99)
HCT VFR BLD AUTO: 38.2 % (ref 34–46.6)
HDLC SERPL-MCNC: 48 MG/DL
HGB BLD-MCNC: 12.1 G/DL (ref 11.1–15.9)
IMM GRANULOCYTES # BLD AUTO: 0 X10E3/UL (ref 0–0.1)
IMM GRANULOCYTES NFR BLD AUTO: 0 %
LDLC SERPL CALC-MCNC: 157 MG/DL (ref 0–99)
LYMPHOCYTES # BLD AUTO: 2.5 X10E3/UL (ref 0.7–3.1)
LYMPHOCYTES NFR BLD AUTO: 33 %
MCH RBC QN AUTO: 25.2 PG (ref 26.6–33)
MCHC RBC AUTO-ENTMCNC: 31.7 G/DL (ref 31.5–35.7)
MCV RBC AUTO: 79 FL (ref 79–97)
MONOCYTES # BLD AUTO: 0.4 X10E3/UL (ref 0.1–0.9)
MONOCYTES NFR BLD AUTO: 5 %
N GONORRHOEA DNA SPEC QL NAA+PROBE: NOT DETECTED
NEUTROPHILS # BLD AUTO: 4.5 X10E3/UL (ref 1.4–7)
NEUTROPHILS NFR BLD AUTO: 58 %
PLATELET # BLD AUTO: 388 X10E3/UL (ref 150–450)
POTASSIUM SERPL-SCNC: 4.3 MMOL/L (ref 3.5–5.2)
PROT SERPL-MCNC: 7.6 G/DL (ref 6–8.5)
RBC # BLD AUTO: 4.81 X10E6/UL (ref 3.77–5.28)
SODIUM SERPL-SCNC: 142 MMOL/L (ref 134–144)
TRIGL SERPL-MCNC: 118 MG/DL (ref 0–149)
VLDLC SERPL CALC-MCNC: 24 MG/DL (ref 5–40)
WBC # BLD AUTO: 7.7 X10E3/UL (ref 3.4–10.8)

## 2019-08-03 ENCOUNTER — TELEPHONE (OUTPATIENT)
Dept: FAMILY MEDICINE | Facility: CLINIC | Age: 24
End: 2019-08-03

## 2019-08-03 NOTE — TELEPHONE ENCOUNTER
----- Message from Jackie Booker sent at 8/2/2019  4:28 PM CDT -----  Contact: patient  Type: Needs Medical Advice    Who Called:  patient  Symptoms (please be specific):  na  How long has patient had these symptoms:  jodi  Pharmacy name and phone #:  jodi  Best Call Back Number: [phone]  Additional Information: Patient has a few questions for the nurse from her apt on 7/25.  Please call to advise. Thanks!

## 2019-08-03 NOTE — TELEPHONE ENCOUNTER
Spoke to pt. She is going to schedule an appointment with provider to discuss her concerns. Will schedule later today via MyChart.

## 2019-08-04 ENCOUNTER — PATIENT MESSAGE (OUTPATIENT)
Dept: FAMILY MEDICINE | Facility: CLINIC | Age: 24
End: 2019-08-04

## 2019-08-15 ENCOUNTER — TELEPHONE (OUTPATIENT)
Dept: SURGERY | Facility: CLINIC | Age: 24
End: 2019-08-15

## 2019-08-15 NOTE — TELEPHONE ENCOUNTER
----- Message from Vince Farah sent at 8/15/2019  1:45 PM CDT -----  Contact: pt  Pt states she had labs done at [facility] and pt states she informed the office that her insurance did not cover them to be done at [facility].     Pt is requesting a call back to discuss. Pt has spoken with billing and her insurance already.     Call Back #: [phone]  Thanks

## 2019-08-15 NOTE — TELEPHONE ENCOUNTER
Receiving bill for pathology, instructed pt to bring a copy to me and I will present to my manager for help.

## 2019-09-11 ENCOUNTER — PATIENT MESSAGE (OUTPATIENT)
Dept: FAMILY MEDICINE | Facility: CLINIC | Age: 24
End: 2019-09-11

## 2019-09-11 DIAGNOSIS — R74.8 ELEVATED LIVER ENZYMES: ICD-10-CM

## 2019-09-11 DIAGNOSIS — E78.5 HYPERLIPIDEMIA, UNSPECIFIED HYPERLIPIDEMIA TYPE: Primary | ICD-10-CM

## 2019-09-13 ENCOUNTER — OFFICE VISIT (OUTPATIENT)
Dept: FAMILY MEDICINE | Facility: CLINIC | Age: 24
End: 2019-09-13
Payer: COMMERCIAL

## 2019-09-13 VITALS
BODY MASS INDEX: 31.1 KG/M2 | TEMPERATURE: 99 F | HEIGHT: 63 IN | DIASTOLIC BLOOD PRESSURE: 88 MMHG | WEIGHT: 175.5 LBS | HEART RATE: 97 BPM | SYSTOLIC BLOOD PRESSURE: 128 MMHG | OXYGEN SATURATION: 96 %

## 2019-09-13 DIAGNOSIS — J32.9 BACTERIAL SINUSITIS: Primary | ICD-10-CM

## 2019-09-13 DIAGNOSIS — R06.2 WHEEZING: ICD-10-CM

## 2019-09-13 DIAGNOSIS — B96.89 BACTERIAL SINUSITIS: Primary | ICD-10-CM

## 2019-09-13 DIAGNOSIS — R09.81 NASAL CONGESTION: ICD-10-CM

## 2019-09-13 DIAGNOSIS — R05.9 COUGH: ICD-10-CM

## 2019-09-13 PROCEDURE — 99999 PR PBB SHADOW E&M-EST. PATIENT-LVL IV: ICD-10-PCS | Mod: PBBFAC,,, | Performed by: NURSE PRACTITIONER

## 2019-09-13 PROCEDURE — 99214 OFFICE O/P EST MOD 30 MIN: CPT | Mod: S$GLB,,, | Performed by: NURSE PRACTITIONER

## 2019-09-13 PROCEDURE — 99999 PR PBB SHADOW E&M-EST. PATIENT-LVL IV: CPT | Mod: PBBFAC,,, | Performed by: NURSE PRACTITIONER

## 2019-09-13 PROCEDURE — 3008F BODY MASS INDEX DOCD: CPT | Mod: CPTII,S$GLB,, | Performed by: NURSE PRACTITIONER

## 2019-09-13 PROCEDURE — 99214 PR OFFICE/OUTPT VISIT, EST, LEVL IV, 30-39 MIN: ICD-10-PCS | Mod: S$GLB,,, | Performed by: NURSE PRACTITIONER

## 2019-09-13 PROCEDURE — 3008F PR BODY MASS INDEX (BMI) DOCUMENTED: ICD-10-PCS | Mod: CPTII,S$GLB,, | Performed by: NURSE PRACTITIONER

## 2019-09-13 RX ORDER — CEFDINIR 250 MG/5ML
300 POWDER, FOR SUSPENSION ORAL EVERY 12 HOURS
Qty: 120 ML | Refills: 0 | Status: SHIPPED | OUTPATIENT
Start: 2019-09-13 | End: 2019-11-01

## 2019-09-13 RX ORDER — FLUTICASONE PROPIONATE 50 MCG
1 SPRAY, SUSPENSION (ML) NASAL DAILY
Qty: 1 BOTTLE | Refills: 0 | Status: SHIPPED | OUTPATIENT
Start: 2019-09-13 | End: 2020-07-09

## 2019-09-13 RX ORDER — METHYLPREDNISOLONE 4 MG/1
TABLET ORAL
Qty: 1 PACKAGE | Refills: 0 | Status: SHIPPED | OUTPATIENT
Start: 2019-09-13 | End: 2019-10-04

## 2019-09-13 RX ORDER — ALBUTEROL SULFATE 90 UG/1
2 AEROSOL, METERED RESPIRATORY (INHALATION) EVERY 6 HOURS PRN
Qty: 18 G | Refills: 0 | Status: SHIPPED | OUTPATIENT
Start: 2019-09-13 | End: 2021-02-22

## 2019-09-13 NOTE — PROGRESS NOTES
Subjective:       Patient ID: Maureen Barrera is a 24 y.o. female.    Chief Complaint: Sinus Problem (symptoms started monday); Nasal Congestion; and Cough    The patient is here today for   Sinus    This is the first time that I am seeing the patient for this problem.       Sinusitis   This is a new problem. The current episode started in the past 7 days. The problem has been gradually worsening since onset. There has been no fever. Associated symptoms include congestion, coughing, sinus pressure, sneezing and a sore throat. Pertinent negatives include no chills, diaphoresis, ear pain, headaches, hoarse voice, neck pain, shortness of breath or swollen glands. Treatments tried: robitussin dm, sudafed      Vitals:    09/13/19 1110   BP: 128/88   Pulse: 97   Temp:      Review of Systems   Constitutional: Positive for activity change and fever. Negative for appetite change, chills, diaphoresis and fatigue.   HENT: Positive for congestion, sinus pressure, sneezing and sore throat. Negative for drooling, ear discharge, ear pain, facial swelling, hoarse voice, nosebleeds, postnasal drip, rhinorrhea and sinus pain.    Eyes: Negative.  Negative for discharge, redness and itching.   Respiratory: Positive for cough and wheezing. Negative for apnea, choking, chest tightness and shortness of breath.    Cardiovascular: Negative.  Negative for chest pain and leg swelling.   Gastrointestinal: Negative.  Negative for abdominal pain, anal bleeding, constipation, diarrhea and nausea.   Endocrine: Negative.  Negative for cold intolerance, heat intolerance and polydipsia.   Genitourinary: Negative.  Negative for decreased urine volume, difficulty urinating, dysuria, flank pain, hematuria, pelvic pain and vaginal bleeding.   Musculoskeletal: Negative.  Negative for arthralgias, gait problem, joint swelling, myalgias and neck pain.   Skin: Negative.  Negative for color change and rash.   Allergic/Immunologic: Negative.  Negative for  environmental allergies and food allergies.   Neurological: Negative.  Negative for dizziness, speech difficulty, weakness, numbness and headaches.   Hematological: Negative.  Negative for adenopathy.   Psychiatric/Behavioral: Negative.  Negative for behavioral problems, confusion, hallucinations and self-injury. The patient is not hyperactive.        Past Medical History:   Diagnosis Date    Anxiety     Eczema     Fracture of left foot 2010    GERD (gastroesophageal reflux disease)     Headache(784.0)     Lactose intolerance     Polyp of nasal cavity     PONV (postoperative nausea and vomiting)     Recurrent upper respiratory infection (URI)     Sprain of right ankle 10/26/11    Varicella     2nd grade       Objective:      Physical Exam   Constitutional: She is oriented to person, place, and time. She appears well-developed and well-nourished. She appears ill.   HENT:   Head: Normocephalic and atraumatic.   Right Ear: Hearing, external ear and ear canal normal. A middle ear effusion is present.   Left Ear: Hearing, external ear and ear canal normal. A middle ear effusion is present.   Nose: Rhinorrhea present. No mucosal edema or sinus tenderness. Right sinus exhibits maxillary sinus tenderness. Right sinus exhibits no frontal sinus tenderness. Left sinus exhibits maxillary sinus tenderness. Left sinus exhibits no frontal sinus tenderness.   Mouth/Throat: Uvula is midline, oropharynx is clear and moist and mucous membranes are normal. No oropharyngeal exudate or posterior oropharyngeal edema.   Eyes: Pupils are equal, round, and reactive to light. Conjunctivae and EOM are normal.   Neck: Normal range of motion. Neck supple.   Cardiovascular: Normal rate, regular rhythm, normal heart sounds and intact distal pulses. Exam reveals no friction rub.   No murmur heard.  Pulmonary/Chest: Effort normal. No stridor. No respiratory distress. She has wheezes. She has no rales.   Abdominal: Soft. Bowel sounds are  normal.   Musculoskeletal: Normal range of motion. She exhibits no edema or tenderness.   Neurological: She is alert and oriented to person, place, and time. No cranial nerve deficit. Coordination normal.   Skin: Skin is warm and dry.   Psychiatric: She has a normal mood and affect. Her behavior is normal. Judgment and thought content normal.   Nursing note and vitals reviewed.      Assessment:       1. Bacterial sinusitis    2. Cough    3. Wheezing    4. Nasal congestion        Plan:       Bacterial sinusitis  -     cefdinir (OMNICEF) 250 mg/5 mL suspension; Take 6 mLs (300 mg total) by mouth every 12 (twelve) hours.  Dispense: 120 mL; Refill: 0    Cough    Wheezing  -     albuterol (VENTOLIN HFA) 90 mcg/actuation inhaler; Inhale 2 puffs into the lungs every 6 (six) hours as needed for Wheezing. Rescue  Dispense: 18 g; Refill: 0  -     methylPREDNISolone (MEDROL DOSEPACK) 4 mg tablet; use as directed  Dispense: 1 Package; Refill: 0    Nasal congestion  -     fluticasone propionate (FLONASE) 50 mcg/actuation nasal spray; 1 spray (50 mcg total) by Each Nostril route once daily.  Dispense: 1 Bottle; Refill: 0        FU if not better

## 2019-09-13 NOTE — LETTER
September 13, 2019      [facility]  [address] [facility] [address] [place] [address]  Phone: [phone]  Fax: [phone]       Patient: Maureen Barrera   YOB: 1995  Date of Visit: 09/13/2019    To Whom It May Concern:    Diallo Barrera  was at [facility] on 09/13/2019. She may return to work on September 17th, 2019.    Please excuse due to illness.         If you have any questions or concerns, or if I can be of further assistance, please do not hesitate to contact me.          Sincerely,          Estella Palacios, NP

## 2019-09-13 NOTE — PROGRESS NOTES
Maureen      Physical Exam      Diagnoses and health risks identified today and associated recommendations/orders:        Provided Maureen cherry

## 2019-09-17 ENCOUNTER — TELEPHONE (OUTPATIENT)
Dept: FAMILY MEDICINE | Facility: CLINIC | Age: 24
End: 2019-09-17

## 2019-09-17 NOTE — TELEPHONE ENCOUNTER
----- Message from Rosamaria Grey sent at 9/16/2019  4:50 PM CDT -----  Contact: self [phone]  She asks that you call her regarding her recovery.  She wants to know where you think she should be.  She is still coughing badly and won't be able to return to work tomorrow, so she will need another doctor's excuse.  Please call her.  Thank you!

## 2019-09-17 NOTE — TELEPHONE ENCOUNTER
Pt states that she was not able to go back to work because her cough is still bad and productive(green), she states she is still currently taking abx, she thinks that she should be ready to return to work on Friday. Pt is request work excuse until 09/20, please review and advise.      no

## 2019-09-19 ENCOUNTER — TELEPHONE (OUTPATIENT)
Dept: FAMILY MEDICINE | Facility: CLINIC | Age: 24
End: 2019-09-19

## 2019-09-19 NOTE — TELEPHONE ENCOUNTER
Spoke with pt and informed her that excuse was ready for  she states she will pick it up tomorrow. Document placed at

## 2019-09-19 NOTE — TELEPHONE ENCOUNTER
Spoke to pt. Pt states she was seen at the beginning of the week and was given a work excuse through today. Pt is wondering if it can be extended through tomorrow and go back on Monday. Pt states she still isn't feeling well.please advise.

## 2019-09-19 NOTE — TELEPHONE ENCOUNTER
----- Message from Madeline Mayfield sent at 9/19/2019 10:16 AM CDT -----  Contact: self   Patient want to know if you can give her a doctor excuse until Monday please call back at [phone]

## 2019-09-27 ENCOUNTER — PATIENT MESSAGE (OUTPATIENT)
Dept: FAMILY MEDICINE | Facility: CLINIC | Age: 24
End: 2019-09-27

## 2019-09-27 LAB
ALBUMIN SERPL-MCNC: 4.5 G/DL (ref 3.5–5.5)
ALBUMIN/GLOB SERPL: 1.6 {RATIO} (ref 1.2–2.2)
ALP SERPL-CCNC: 75 IU/L (ref 39–117)
ALT SERPL-CCNC: 51 IU/L (ref 0–32)
AST SERPL-CCNC: 25 IU/L (ref 0–40)
BASOPHILS # BLD AUTO: 0.1 X10E3/UL (ref 0–0.2)
BASOPHILS NFR BLD AUTO: 1 %
BILIRUB SERPL-MCNC: 0.3 MG/DL (ref 0–1.2)
BUN SERPL-MCNC: 12 MG/DL (ref 6–20)
BUN/CREAT SERPL: 14 (ref 9–23)
CALCIUM SERPL-MCNC: 9.3 MG/DL (ref 8.7–10.2)
CHLORIDE SERPL-SCNC: 106 MMOL/L (ref 96–106)
CHOLEST SERPL-MCNC: 183 MG/DL (ref 100–199)
CO2 SERPL-SCNC: 22 MMOL/L (ref 20–29)
CREAT SERPL-MCNC: 0.84 MG/DL (ref 0.57–1)
EOSINOPHIL # BLD AUTO: 0.2 X10E3/UL (ref 0–0.4)
EOSINOPHIL NFR BLD AUTO: 2 %
ERYTHROCYTE [DISTWIDTH] IN BLOOD BY AUTOMATED COUNT: 14.1 % (ref 12.3–15.4)
GLOBULIN SER CALC-MCNC: 2.9 G/DL (ref 1.5–4.5)
GLUCOSE SERPL-MCNC: 84 MG/DL (ref 65–99)
HCT VFR BLD AUTO: 39.8 % (ref 34–46.6)
HDLC SERPL-MCNC: 48 MG/DL
HGB BLD-MCNC: 12.9 G/DL (ref 11.1–15.9)
IMM GRANULOCYTES # BLD AUTO: 0 X10E3/UL (ref 0–0.1)
IMM GRANULOCYTES NFR BLD AUTO: 0 %
LDLC SERPL CALC-MCNC: 110 MG/DL (ref 0–99)
LYMPHOCYTES # BLD AUTO: 2.9 X10E3/UL (ref 0.7–3.1)
LYMPHOCYTES NFR BLD AUTO: 36 %
MCH RBC QN AUTO: 25.6 PG (ref 26.6–33)
MCHC RBC AUTO-ENTMCNC: 32.4 G/DL (ref 31.5–35.7)
MCV RBC AUTO: 79 FL (ref 79–97)
MONOCYTES # BLD AUTO: 0.4 X10E3/UL (ref 0.1–0.9)
MONOCYTES NFR BLD AUTO: 5 %
NEUTROPHILS # BLD AUTO: 4.5 X10E3/UL (ref 1.4–7)
NEUTROPHILS NFR BLD AUTO: 56 %
PLATELET # BLD AUTO: 363 X10E3/UL (ref 150–450)
POTASSIUM SERPL-SCNC: 4.6 MMOL/L (ref 3.5–5.2)
PROT SERPL-MCNC: 7.4 G/DL (ref 6–8.5)
RBC # BLD AUTO: 5.04 X10E6/UL (ref 3.77–5.28)
SODIUM SERPL-SCNC: 142 MMOL/L (ref 134–144)
TRIGL SERPL-MCNC: 124 MG/DL (ref 0–149)
VLDLC SERPL CALC-MCNC: 25 MG/DL (ref 5–40)
WBC # BLD AUTO: 8.1 X10E3/UL (ref 3.4–10.8)

## 2019-10-11 RX ORDER — NORGESTIMATE AND ETHINYL ESTRADIOL 7DAYSX3 LO
KIT ORAL
Qty: 28 TABLET | Refills: 0 | Status: SHIPPED | OUTPATIENT
Start: 2019-10-11 | End: 2019-11-03 | Stop reason: SDUPTHER

## 2019-11-01 ENCOUNTER — OFFICE VISIT (OUTPATIENT)
Dept: FAMILY MEDICINE | Facility: CLINIC | Age: 24
End: 2019-11-01
Payer: COMMERCIAL

## 2019-11-01 ENCOUNTER — TELEPHONE (OUTPATIENT)
Dept: FAMILY MEDICINE | Facility: CLINIC | Age: 24
End: 2019-11-01

## 2019-11-01 VITALS
TEMPERATURE: 98 F | DIASTOLIC BLOOD PRESSURE: 80 MMHG | OXYGEN SATURATION: 98 % | HEART RATE: 94 BPM | SYSTOLIC BLOOD PRESSURE: 100 MMHG | HEIGHT: 63 IN | BODY MASS INDEX: 31.13 KG/M2 | WEIGHT: 175.69 LBS

## 2019-11-01 DIAGNOSIS — M75.42 IMPINGEMENT SYNDROME OF LEFT SHOULDER: ICD-10-CM

## 2019-11-01 DIAGNOSIS — M25.512 ACUTE PAIN OF LEFT SHOULDER: Primary | ICD-10-CM

## 2019-11-01 PROCEDURE — 99999 PR PBB SHADOW E&M-EST. PATIENT-LVL V: CPT | Mod: PBBFAC,,, | Performed by: NURSE PRACTITIONER

## 2019-11-01 PROCEDURE — 3008F PR BODY MASS INDEX (BMI) DOCUMENTED: ICD-10-PCS | Mod: CPTII,S$GLB,, | Performed by: NURSE PRACTITIONER

## 2019-11-01 PROCEDURE — 99214 PR OFFICE/OUTPT VISIT, EST, LEVL IV, 30-39 MIN: ICD-10-PCS | Mod: S$GLB,,, | Performed by: NURSE PRACTITIONER

## 2019-11-01 PROCEDURE — 3008F BODY MASS INDEX DOCD: CPT | Mod: CPTII,S$GLB,, | Performed by: NURSE PRACTITIONER

## 2019-11-01 PROCEDURE — 99999 PR PBB SHADOW E&M-EST. PATIENT-LVL V: ICD-10-PCS | Mod: PBBFAC,,, | Performed by: NURSE PRACTITIONER

## 2019-11-01 PROCEDURE — 99214 OFFICE O/P EST MOD 30 MIN: CPT | Mod: S$GLB,,, | Performed by: NURSE PRACTITIONER

## 2019-11-01 RX ORDER — PREDNISONE 20 MG/1
TABLET ORAL
Qty: 12 TABLET | Refills: 0 | Status: SHIPPED | OUTPATIENT
Start: 2019-11-01 | End: 2020-07-09

## 2019-11-01 NOTE — TELEPHONE ENCOUNTER
----- Message from Charlotte Dimas sent at 11/1/2019  2:17 PM CDT -----  Contact: Pt  Pt may be about 10 mins late due to traffic)

## 2019-11-01 NOTE — PROGRESS NOTES
Subjective:       Patient ID: Maureen Barrera is a 24 y.o. female.    Chief Complaint: rotator cuff  pain (left---symptoms started 3 weeks ago)    Shoulder Pain    Pain location: left shoulder. This is a new problem. The current episode started 1 to 4 weeks ago. The problem occurs intermittently. The problem has been gradually worsening. The quality of the pain is described as aching. The pain is at a severity of 9/10. The pain is moderate. Associated symptoms include an inability to bear weight, joint swelling, a limited range of motion and stiffness. Pertinent negatives include no fever, headaches, itching, joint locking, numbness, tingling or visual symptoms. Associated symptoms comments: Popping with rolling it   Slept on it funny one night   . The symptoms are aggravated by activity. She has tried rest and NSAIDS for the symptoms. Improvement on treatment: advil does not help      Vitals:    11/01/19 1445   BP: 100/80   Pulse: 94   Temp: 97.9 °F (36.6 °C)     Review of Systems   Constitutional: Negative.  Negative for activity change, appetite change, chills, diaphoresis, fatigue and fever.   HENT: Negative.  Negative for congestion, drooling, ear discharge, facial swelling, nosebleeds, postnasal drip, rhinorrhea, sinus pressure, sinus pain and sore throat.    Eyes: Negative.  Negative for discharge, redness and itching.   Respiratory: Negative.  Negative for apnea, cough, choking, chest tightness, shortness of breath and wheezing.    Cardiovascular: Negative.  Negative for chest pain and leg swelling.   Gastrointestinal: Negative.  Negative for abdominal pain, anal bleeding, constipation, diarrhea and nausea.   Endocrine: Negative.  Negative for cold intolerance, heat intolerance and polydipsia.   Genitourinary: Negative.  Negative for decreased urine volume, difficulty urinating, dysuria, flank pain, hematuria, pelvic pain and vaginal bleeding.   Musculoskeletal: Positive for stiffness. Negative for  arthralgias, gait problem, joint swelling, myalgias and neck pain.   Skin: Negative.  Negative for color change, itching and rash.   Allergic/Immunologic: Negative.  Negative for environmental allergies and food allergies.   Neurological: Negative.  Negative for dizziness, tingling, speech difficulty, weakness, numbness and headaches.   Hematological: Negative.  Negative for adenopathy.   Psychiatric/Behavioral: Negative.  Negative for behavioral problems, confusion, hallucinations and self-injury. The patient is not hyperactive.        Past Medical History:   Diagnosis Date    Anxiety     Eczema     Fracture of left foot 2010    GERD (gastroesophageal reflux disease)     Headache(784.0)     Lactose intolerance     Polyp of nasal cavity     PONV (postoperative nausea and vomiting)     Recurrent upper respiratory infection (URI)     Sprain of right ankle 10/26/11    Varicella     2nd grade       Objective:      Physical Exam   Constitutional: She is oriented to person, place, and time. She appears well-developed and well-nourished.   HENT:   Head: Normocephalic and atraumatic.   Right Ear: Hearing, tympanic membrane, external ear and ear canal normal.   Left Ear: Hearing, tympanic membrane, external ear and ear canal normal.   Nose: Nose normal. No mucosal edema, rhinorrhea or sinus tenderness. Right sinus exhibits no maxillary sinus tenderness and no frontal sinus tenderness. Left sinus exhibits no maxillary sinus tenderness and no frontal sinus tenderness.   Mouth/Throat: Uvula is midline, oropharynx is clear and moist and mucous membranes are normal. No oropharyngeal exudate or posterior oropharyngeal edema.   Eyes: Pupils are equal, round, and reactive to light. Conjunctivae and EOM are normal.   Neck: Normal range of motion. Neck supple.   Cardiovascular: Normal rate, regular rhythm, normal heart sounds and intact distal pulses. Exam reveals no friction rub.   No murmur heard.  Pulmonary/Chest: Effort  normal and breath sounds normal. No stridor. No respiratory distress. She has no wheezes. She has no rales.   Abdominal: Soft. Bowel sounds are normal.   Musculoskeletal: She exhibits no edema.        Left shoulder: She exhibits tenderness, pain and spasm.   Pain with abduction and external rotation       Neurological: She is alert and oriented to person, place, and time. No cranial nerve deficit. Coordination normal.   Skin: Skin is warm and dry.   Psychiatric: She has a normal mood and affect. Her behavior is normal. Judgment and thought content normal.   Nursing note and vitals reviewed.      Assessment:       1. Acute pain of left shoulder    2. Impingement syndrome of left shoulder        Plan:       Acute pain of left shoulder  -  -     Ambulatory referral/consult to Orthopedics; Future; Expected date: 11/01/2019  -     predniSONE (DELTASONE) 20 MG tablet; Take 3 tablets for 2 days then 2 tablets for 2 days then 1 tablet for 2 days  Dispense: 12 tablet; Refill: 0  -     X-Ray Shoulder Trauma 3 view Left; Future; Expected date: 11/01/2019    Impingement syndrome of left shoulder  -     Ambulatory referral/consult to Orthopedics; Future; Expected date: 11/01/2019  -     predniSONE (DELTASONE) 20 MG tablet; Take 3 tablets for 2 days then 2 tablets for 2 days then 1 tablet for 2 days  Dispense: 12 tablet; Refill: 0  -     X-Ray Shoulder Trauma 3 view Left; Future; Expected date: 11/01/2019            Can send to ortho if not better   She will call with update  Fu if not better

## 2019-11-04 ENCOUNTER — TELEPHONE (OUTPATIENT)
Dept: FAMILY MEDICINE | Facility: CLINIC | Age: 24
End: 2019-11-04

## 2019-11-04 RX ORDER — NORGESTIMATE AND ETHINYL ESTRADIOL 7DAYSX3 LO
KIT ORAL
Qty: 28 TABLET | Refills: 0 | Status: SHIPPED | OUTPATIENT
Start: 2019-11-04 | End: 2020-03-12

## 2019-11-04 RX ORDER — NORGESTIMATE AND ETHINYL ESTRADIOL 7DAYSX3 LO
KIT ORAL
Qty: 28 TABLET | Refills: 0 | Status: SHIPPED | OUTPATIENT
Start: 2019-11-04 | End: 2020-01-13

## 2019-11-04 NOTE — TELEPHONE ENCOUNTER
Spoke with pt and reviewed results per Estella Palacios NP. Pt verbalized and stated that she did not have any questions. Results sent to scan since from an external source.

## 2020-01-13 RX ORDER — NORGESTIMATE AND ETHINYL ESTRADIOL 7DAYSX3 LO
KIT ORAL
Qty: 28 TABLET | Refills: 0 | Status: SHIPPED | OUTPATIENT
Start: 2020-01-13 | End: 2020-02-11

## 2020-01-22 ENCOUNTER — TELEPHONE (OUTPATIENT)
Dept: FAMILY MEDICINE | Facility: CLINIC | Age: 25
End: 2020-01-22

## 2020-01-22 NOTE — TELEPHONE ENCOUNTER
----- Message from Raheem Marcos sent at 1/22/2020 10:16 AM CST -----  Contact: Patient  Type: Needs Medical Advice    Who Called:  Patient  Best Call Back Number: [phone]  Additional Information: Patient would like to discuss receiving a doctor's excuse for work. Please call to advise. Thanks!

## 2020-01-23 ENCOUNTER — OFFICE VISIT (OUTPATIENT)
Dept: OPTOMETRY | Facility: CLINIC | Age: 25
End: 2020-01-23
Payer: COMMERCIAL

## 2020-01-23 DIAGNOSIS — H52.03 LATENT HYPERMETROPIA, BILATERAL: ICD-10-CM

## 2020-01-23 DIAGNOSIS — G43.001 MIGRAINE WITHOUT AURA AND WITH STATUS MIGRAINOSUS, NOT INTRACTABLE: Primary | ICD-10-CM

## 2020-01-23 PROCEDURE — 99999 PR PBB SHADOW E&M-EST. PATIENT-LVL III: CPT | Mod: PBBFAC,,, | Performed by: OPTOMETRIST

## 2020-01-23 PROCEDURE — 92015 PR REFRACTION: ICD-10-PCS | Mod: S$GLB,,, | Performed by: OPTOMETRIST

## 2020-01-23 PROCEDURE — 92004 PR EYE EXAM, NEW PATIENT,COMPREHESV: ICD-10-PCS | Mod: S$GLB,,, | Performed by: OPTOMETRIST

## 2020-01-23 PROCEDURE — 92015 DETERMINE REFRACTIVE STATE: CPT | Mod: S$GLB,,, | Performed by: OPTOMETRIST

## 2020-01-23 PROCEDURE — 92004 COMPRE OPH EXAM NEW PT 1/>: CPT | Mod: S$GLB,,, | Performed by: OPTOMETRIST

## 2020-01-23 PROCEDURE — 99999 PR PBB SHADOW E&M-EST. PATIENT-LVL III: ICD-10-PCS | Mod: PBBFAC,,, | Performed by: OPTOMETRIST

## 2020-01-23 NOTE — LETTER
January 23, 2020      [facility]  [address]  [place] [address]  Phone: [phone]  Fax: [phone]       Patient: Maureen Barrera   YOB: 1995  Date of Visit: 01/23/2020    To Whom It May Concern:    Diallo Barrera  was at [facility] on 1/22/2020 and 01/23/2020. She may return to work/school on 1/24/2020 without restrictions. If you have any questions or concerns, or if I can be of further assistance, please do not hesitate to contact me.    Sincerely,    Bernadette Strickland

## 2020-01-23 NOTE — LETTER
January 23, 2020      [facility]  [address]  [place] [address]  Phone: [phone]  Fax: [phone]       Patient: Maureen Barrera   YOB: 1995  Date of Visit: 01/23/2020    To Whom It May Concern:    Diallo Barrera  was at [facility] on 1/22/19 and 01/23/2020. She may return to work/school on 1/24/2020 without restrictions. If you have any questions or concerns, or if I can be of further assistance, please do not hesitate to contact me.    Sincerely,    Bernadette Strickland

## 2020-01-23 NOTE — PROGRESS NOTES
HPI     Routine eye exam-dle- +4 years    Pt complains of headaches, possibly stress related. Complains of blurred   vision when she has headaches. Denies any eye pain. Complains of floaters,   no flashes. Pt wanting to know if she can get a note for missing work   tomorrow and today-    Last edited by Bernadette Strickland on 1/23/2020 10:37 AM. (History)        ROS     Positive for: Eyes    Negative for: Constitutional, Gastrointestinal, Neurological, Skin,   Genitourinary, Musculoskeletal, HENT, Endocrine, Cardiovascular,   Respiratory, Psychiatric, Allergic/Imm, Heme/Lymph    Last edited by LETICIA Londono, OD on 1/23/2020 11:05 AM. (History)        Assessment /Plan     For exam results, see Encounter Report.    Migraine without aura and with status migrainosus, not intractable    Latent hypermetropia, bilateral      1. Longstanding w/ exacerbations  Ocular health appears normal IOP/ DFE OU  2. 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  RTC 1 year, prn if any changes / issues

## 2020-01-23 NOTE — PATIENT INSTRUCTIONS
"DRY EYES:  Use Over The Counter artificial tears as needed for dry eye symptoms.  Some common brands include:  Systane, Optive, and Refresh.  These drops can be used as frequently as desired, but may be most helpful use during long periods of concentrated work.  For example, reading / working at the computer.  Avoid drops that "get redness out", as these contain medication that may further irritate the eyes.    ALLERGY EYES / SYMPTOMS:    Over the counter medications include--Zaditor and Alaway  Use as directed 1-2 drops daily for symptoms of itching / watering eyes.  These drops will not help for dry eye or exposure symptoms.    REDNESS RELIEF:  Lumify---is a good redness reliever that will not cause chronic irritation.    FLASHES / FLOATERS / POSTERIOR VITREOUS DETACHMENT    Call the clinic if you have any further changes in symptoms.  Including:  Increased numbers of floaters or flashing lights, dimness or darkness that moves through or stays constant in your vision, or any pain in the eye (s).              "

## 2020-01-23 NOTE — LETTER
January 23, 2020      [facility]  [address]  [place] [address]  Phone: [phone]  Fax: [phone]       Patient: Maureen Barrera   YOB: 1995  Date of Visit: 01/23/2020    To Whom It May Concern:    Diallo Barrera  was at [facility] on 01/23/2020. She may return to work/school on 1/24/2020 without restrictions. If you have any questions or concerns, or if I can be of further assistance, please do not hesitate to contact me.    Sincerely,    Bernadette Strickland

## 2020-01-24 ENCOUNTER — TELEPHONE (OUTPATIENT)
Dept: FAMILY MEDICINE | Facility: CLINIC | Age: 25
End: 2020-01-24

## 2020-02-11 RX ORDER — NORGESTIMATE AND ETHINYL ESTRADIOL 7DAYSX3 LO
KIT ORAL
Qty: 28 TABLET | Refills: 0 | Status: SHIPPED | OUTPATIENT
Start: 2020-02-11 | End: 2020-03-12

## 2020-03-05 RX ORDER — NORGESTIMATE AND ETHINYL ESTRADIOL 7DAYSX3 LO
KIT ORAL
Qty: 28 TABLET | Refills: 0 | OUTPATIENT
Start: 2020-03-05

## 2020-03-10 ENCOUNTER — PATIENT OUTREACH (OUTPATIENT)
Dept: ADMINISTRATIVE | Facility: OTHER | Age: 25
End: 2020-03-10

## 2020-03-12 ENCOUNTER — OFFICE VISIT (OUTPATIENT)
Dept: OBSTETRICS AND GYNECOLOGY | Facility: CLINIC | Age: 25
End: 2020-03-12
Payer: COMMERCIAL

## 2020-03-12 VITALS
BODY MASS INDEX: 32.02 KG/M2 | WEIGHT: 180.75 LBS | SYSTOLIC BLOOD PRESSURE: 122 MMHG | DIASTOLIC BLOOD PRESSURE: 82 MMHG

## 2020-03-12 DIAGNOSIS — Z01.419 ENCOUNTER FOR ROUTINE GYNECOLOGICAL EXAMINATION WITH PAPANICOLAOU SMEAR OF CERVIX: Primary | ICD-10-CM

## 2020-03-12 PROCEDURE — 99999 PR PBB SHADOW E&M-EST. PATIENT-LVL III: CPT | Mod: PBBFAC,,, | Performed by: OBSTETRICS & GYNECOLOGY

## 2020-03-12 PROCEDURE — 99395 PR PREVENTIVE VISIT,EST,18-39: ICD-10-PCS | Mod: S$GLB,,, | Performed by: OBSTETRICS & GYNECOLOGY

## 2020-03-12 PROCEDURE — 88175 CYTOPATH C/V AUTO FLUID REDO: CPT

## 2020-03-12 PROCEDURE — 99999 PR PBB SHADOW E&M-EST. PATIENT-LVL III: ICD-10-PCS | Mod: PBBFAC,,, | Performed by: OBSTETRICS & GYNECOLOGY

## 2020-03-12 PROCEDURE — 99395 PREV VISIT EST AGE 18-39: CPT | Mod: S$GLB,,, | Performed by: OBSTETRICS & GYNECOLOGY

## 2020-03-12 RX ORDER — NORGESTIMATE AND ETHINYL ESTRADIOL 7DAYSX3 LO
1 KIT ORAL DAILY
Qty: 90 TABLET | Refills: 3 | Status: SHIPPED | OUTPATIENT
Start: 2020-03-12 | End: 2021-01-21

## 2020-03-12 NOTE — PROGRESS NOTES
Chief Complaint   Patient presents with    Well Woman       History and Physical:  Patient's last menstrual period was 02/13/2020 (approximate).       Maureen Barrera is a 24 y.o.  female who presents today for her routine annual GYN exam. The patient has no Gynecology complaints today. No bowel or bladder complaints.       Allergies: Review of patient's allergies indicates:  No Known Allergies    Past Medical History:   Diagnosis Date    Anxiety     Eczema     Fracture of left foot 2010    GERD (gastroesophageal reflux disease)     Headache(784.0)     Lactose intolerance     Polyp of nasal cavity     PONV (postoperative nausea and vomiting)     Recurrent upper respiratory infection (URI)     Sprain of right ankle 10/26/11    Varicella     2nd grade       Past Surgical History:   Procedure Laterality Date    ARTHROSCOPY OF ANKLE WITH DEBRIDEMENT Left 7/11/2018    Procedure: ARTHROSCOPY, ANKLE, WITH DEBRIDEMENT;  Surgeon: Jimmie Red MD;  Location: [facility];  Service: Orthopedics;  Laterality: Left;    LAPAROSCOPIC CHOLECYSTECTOMY N/A 7/10/2019    Procedure: CHOLECYSTECTOMY, LAPAROSCOPIC;  Surgeon: Carlos Alberto Baker MD;  Location: [facility] OR;  Service: General;  Laterality: N/A;    PLANTAR'S WART EXCISION  january 2015    POLYPECTOMY      Nasal    SPHENOIDECTOMY         MEDS:   Current Outpatient Medications on File Prior to Visit   Medication Sig Dispense Refill    albuterol (VENTOLIN HFA) 90 mcg/actuation inhaler Inhale 2 puffs into the lungs every 6 (six) hours as needed for Wheezing. Rescue 18 g 0    fluticasone propionate (FLONASE) 50 mcg/actuation nasal spray 1 spray (50 mcg total) by Each Nostril route once daily. 1 Bottle 0    hydrOXYzine HCl (ATARAX) 25 MG tablet Take 1 tablet (25 mg total) by mouth nightly. 30 tablet 4    ondansetron (ZOFRAN-ODT) 8 MG TbDL Take 1 tablet (8 mg total) by mouth every 12 (twelve) hours as needed. 20 tablet 0    TRI-LO-SPRINTEC  0.18/0.215/0.25 mg-25 mcg tablet TAKE 1 TABLET BY MOUTH EVERY DAY 28 tablet 0    predniSONE (DELTASONE) 20 MG tablet Take 3 tablets for 2 days then 2 tablets for 2 days then 1 tablet for 2 days (Patient not taking: Reported on 2020) 12 tablet 0    TRI-LO-SPRINTEC 0.18/0.215/0.25 mg-25 mcg tablet TAKE 1 TABLET BY MOUTH EVERY DAY (Patient not taking: Reported on 3/12/2020) 28 tablet 0     No current facility-administered medications on file prior to visit.        OB History        0    Para   0    Term   0       0    AB   0    Living   0       SAB   0    TAB   0    Ectopic   0    Multiple   0    Live Births   0                 Social History     Socioeconomic History    Marital status: Single     Spouse name: Not on file    Number of children: Not on file    Years of education: Not on file    Highest education level: Not on file   Occupational History    Occupation: vet tech   Social Needs    Financial resource strain: Not on file    Food insecurity:     Worry: Not on file     Inability: Not on file    Transportation needs:     Medical: Not on file     Non-medical: Not on file   Tobacco Use    Smoking status: Current Some Day Smoker     Types: Vaping w/o nicotine    Smokeless tobacco: Never Used    Tobacco comment: Vapes CBD oil   Substance and Sexual Activity    Alcohol use: Yes     Comment: once a month    Drug use: No    Sexual activity: Not Currently   Lifestyle    Physical activity:     Days per week: Not on file     Minutes per session: Not on file    Stress: Not on file   Relationships    Social connections:     Talks on phone: Not on file     Gets together: Not on file     Attends Religion service: Not on file     Active member of club or organization: Not on file     Attends meetings of clubs or organizations: Not on file     Relationship status: Not on file   Other Topics Concern    Not on file   Social History Narrative    Born in [place].  Moved from [place] in .        Family History   Problem Relation Age of Onset    Allergies Father     Eczema Father     Immunodeficiency Father     Allergies Paternal Aunt     Eczema Paternal Aunt     Allergies Paternal Uncle     Eczema Paternal Uncle     Immunodeficiency Maternal Grandmother     Allergies Paternal Grandmother     Angioedema Paternal Grandmother     Eczema Paternal Grandmother     Allergies Paternal Grandfather     Eczema Paternal Grandfather     Allergic rhinitis Neg Hx     Asthma Neg Hx     Atopy Neg Hx     Rhinitis Neg Hx     Urticaria Neg Hx          Past medical and surgical history reviewed.   I have reviewed the patient's medical history in detail and updated the computerized patient record.        Review of System:   General: no chills, fever, night sweats, weight gain or weight loss  Psychological: no depression or suicidal ideation  Breasts: no new or changing breast lumps, nipple discharge or masses.  Respiratory: no cough, shortness of breath, or wheezing  Cardiovascular: no chest pain or dyspnea on exertion  Gastrointestinal: no abdominal pain, change in bowel habits, or black or bloody stools  Genito-Urinary: no incontinence, urinary frequency/urgency or vulvar/vaginal symptoms, pelvic pain or abnormal vaginal bleeding.  Musculoskeletal: no gait disturbance or muscular weakness      Physical Exam:   /82   Wt 82 kg (180 lb 12.4 oz)   LMP 02/13/2020 (Approximate)   BMI 32.02 kg/m²   Constitutional: She appears alert and responsive. She appears well-developed, well-groomed, and well-nourished. No distress. OverWeight   HENT:   Head: Normocephalic and atraumatic.   Eyes: Conjunctivae and EOM are normal. No scleral icterus.   Neck: Symmetrical. Normal range of motion. Neck supple. No tracheal deviation present. THYROID: without masses or tenderness.  Cardiovascular: Normal rate, no rhythm abnormality noted. Extremities without swelling or edema, warm.    Pulmonary/Chest: Normal  respiratory Effort. No distress or retractions. She exhibits no tenderness.  Breasts: are symmetrical.   Right breast exhibits no inverted nipple, no mass, no nipple discharge, no skin change and no tenderness.   Left breast exhibits no inverted nipple, no mass, no nipple discharge, no skin change and no tenderness.  Abdominal: Soft. She exhibits no distension, hernias or masses. There is no tenderness. No enlargement of liver edge or spleen.  There is no rebound and no guarding.   Genitourinary:    External rectal exam shows no thrombosed external hemorrhoids, no lesions.     Pelvic exam was performed with patient supine.   No labial fusion, and symmetrical.    There is no rash, lesion or injury on the right labia.   There is no rash, lesion or injury on the left labia.   No bleeding and no signs of injury around the vaginal introitus, urethral meatus is normal size and without prolapse or lesions, urethra well supported. The cervix is visualized with no discharge, lesions or friability.   No vaginal discharge found.   No significant Cystocele, Enterocele or rectocele, and cervix and uterus well supported.   Bimanual exam:   The urethra is normal to palpation and there are no palpable vaginal wall masses.   Uterus is not deviated, not enlarged, not fixed, normal shape and not tender.   Cervix exhibits no motion tenderness.    Right adnexum displays no mass or nodularity and no tenderness.   Left adnexum displays no mass or nodularity and no tenderness.  Musculoskeletal: Normal range of motion.   Lymphadenopathy: No inguinal adenopathy present.   Neurological: She is alert and oriented to person, place, and time. Coordination normal.   Skin: Skin is warm and dry. She is not diaphoretic. No rashes, lesions or ulcers.   Psychiatric: She has a normal mood and affect, oriented to person, place, and time.      Assessment:   Normal annual GYN exam  1. Encounter for routine gynecological examination with Papanicolaou smear  of cervix  Liquid-Based Pap Smear, Screening   doing well on oral contraceptive pills     Plan:   PAP w/o HPV - UTD, refill oral contraceptive pills   Referral for BRCA testing - mother and MGM <51yo with breast cancer  Follow up in 1 year.  Patient informed will be contacted with results within 2 weeks. Encouraged to please call back or email if she has not heard from us by then.

## 2020-03-13 ENCOUNTER — TELEPHONE (OUTPATIENT)
Dept: OBSTETRICS AND GYNECOLOGY | Facility: CLINIC | Age: 25
End: 2020-03-13

## 2020-03-13 NOTE — TELEPHONE ENCOUNTER
----- Message from Raheem Calle MD sent at 3/12/2020  2:27 PM CDT -----  Please chack coverage for BRCA testing,   Mother and maternal grandmother with breast cancer <51yo - high risk

## 2020-03-18 ENCOUNTER — TELEPHONE (OUTPATIENT)
Dept: OBSTETRICS AND GYNECOLOGY | Facility: CLINIC | Age: 25
End: 2020-03-18

## 2020-03-18 DIAGNOSIS — Z84.81 FAMILY HISTORY OF BRCA GENE MUTATION: Primary | ICD-10-CM

## 2020-03-18 NOTE — TELEPHONE ENCOUNTER
Paperwork given to pt. Pt states that her insurance will not cover the lab work at [address] bc it is considered a hospital. Pt decided to take orders to [facility] and stated that they are ordering the kit necessary for the lab work. Pt states if there are any issue she will contact us.

## 2020-04-04 LAB
FINAL PATHOLOGIC DIAGNOSIS: NORMAL
Lab: NORMAL

## 2020-04-12 ENCOUNTER — PATIENT MESSAGE (OUTPATIENT)
Dept: FAMILY MEDICINE | Facility: CLINIC | Age: 25
End: 2020-04-12

## 2020-06-08 ENCOUNTER — TELEPHONE (OUTPATIENT)
Dept: HOME HEALTH SERVICES | Facility: CLINIC | Age: 25
End: 2020-06-08

## 2020-06-08 NOTE — TELEPHONE ENCOUNTER
Bree - can you please address- she has seen you in the past and I am not longer providing care in Primary care

## 2020-06-08 NOTE — LETTER
June 8, 2020      [facility]  [address] [place] [address]  Phone: [phone]  Fax: [phone]       Patient: Maureen Barrera   YOB: 1995    To Whom It May Concern:    Please excuse Maureen from work on 5/29/2020 due to chronic migraines. She may return to work/school with no restrictions. If you have any questions or concerns, or if I can be of further assistance, please do not hesitate to contact me.    Sincerely,    Bree Stovall, EDUARDO-BC

## 2020-06-08 NOTE — TELEPHONE ENCOUNTER
----- Message from Viry Avelar sent at 6/8/2020 11:10 AM CDT -----  Contact: pt [phone]  Patient called and asked if you will be able to give her an excuse for the date of May 29,  She called out from work. Due to a Migraine her boss will term her if she does not get   An excuse.  Call  Back at [phone]

## 2020-06-17 ENCOUNTER — TELEPHONE (OUTPATIENT)
Dept: OBSTETRICS AND GYNECOLOGY | Facility: CLINIC | Age: 25
End: 2020-06-17

## 2020-06-17 ENCOUNTER — TELEPHONE (OUTPATIENT)
Dept: FAMILY MEDICINE | Facility: CLINIC | Age: 25
End: 2020-06-17

## 2020-06-17 RX ORDER — AZITHROMYCIN 250 MG/1
TABLET, FILM COATED ORAL
Qty: 6 TABLET | Refills: 0 | Status: SHIPPED | OUTPATIENT
Start: 2020-06-17 | End: 2020-07-09

## 2020-06-17 NOTE — TELEPHONE ENCOUNTER
----- Message from Lauren Yang sent at 6/17/2020 10:17 AM CDT -----  Regarding: advice  Contact: BRANDY MENDEZ [[id number]]  BRANDY MENDEZ [[id number]]  Contact # [phone]   Want to know if form still good for her to get labs (BRCA gene mutation) done?

## 2020-06-17 NOTE — TELEPHONE ENCOUNTER
"Patient reports sneezing, runny nose (green), dry cough, scratchy throat. 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 No fever, no shortness of breath but requested to notify PCP "to see what y'all wanted me to do". Offered video visit, patient states due to insurance, she is not able to schedule a virtual appt.   "

## 2020-06-17 NOTE — TELEPHONE ENCOUNTER
----- Message from Lauren Yang sent at 6/17/2020 10:03 AM CDT -----  Regarding: requesting advice  Contact: BRANDY MENDEZ [[id number]]  Patient stated her throat scratchy, sneezing, dry cough, and her chest tight.  Patient think her chest tight due to the fear of others saying it may be covid.  Patient denies any fever and has not been exposed to anyone with covid.    patient phone number [phone]  patient was attempted to [facility] on call triage, call has been rerouted several times and not able to speak to the nurse.  please call   #fyi:  patient really didnt want to speak to on call, she stated she think the call shouldn't be escalated

## 2020-06-18 ENCOUNTER — PATIENT OUTREACH (OUTPATIENT)
Dept: ADMINISTRATIVE | Facility: OTHER | Age: 25
End: 2020-06-18

## 2020-06-18 NOTE — TELEPHONE ENCOUNTER
She should use the flonase. Take claritin daily.  I will send in a zithromax pack to the pharmacy if she feels infection is starting.

## 2020-06-19 ENCOUNTER — OFFICE VISIT (OUTPATIENT)
Dept: NEUROLOGY | Facility: CLINIC | Age: 25
End: 2020-06-19
Payer: COMMERCIAL

## 2020-06-19 VITALS
DIASTOLIC BLOOD PRESSURE: 84 MMHG | HEIGHT: 63 IN | HEART RATE: 89 BPM | WEIGHT: 177.69 LBS | RESPIRATION RATE: 16 BRPM | BODY MASS INDEX: 31.48 KG/M2 | SYSTOLIC BLOOD PRESSURE: 138 MMHG

## 2020-06-19 DIAGNOSIS — K21.9 GASTROESOPHAGEAL REFLUX DISEASE WITHOUT ESOPHAGITIS: ICD-10-CM

## 2020-06-19 DIAGNOSIS — G43.719 INTRACTABLE CHRONIC MIGRAINE WITHOUT AURA AND WITHOUT STATUS MIGRAINOSUS: Primary | ICD-10-CM

## 2020-06-19 DIAGNOSIS — F41.9 ANXIETY: ICD-10-CM

## 2020-06-19 PROCEDURE — 99999 PR PBB SHADOW E&M-EST. PATIENT-LVL III: CPT | Mod: PBBFAC,,, | Performed by: PSYCHIATRY & NEUROLOGY

## 2020-06-19 PROCEDURE — 99204 PR OFFICE/OUTPT VISIT, NEW, LEVL IV, 45-59 MIN: ICD-10-PCS | Mod: S$GLB,,, | Performed by: PSYCHIATRY & NEUROLOGY

## 2020-06-19 PROCEDURE — 99204 OFFICE O/P NEW MOD 45 MIN: CPT | Mod: S$GLB,,, | Performed by: PSYCHIATRY & NEUROLOGY

## 2020-06-19 PROCEDURE — 3008F PR BODY MASS INDEX (BMI) DOCUMENTED: ICD-10-PCS | Mod: CPTII,S$GLB,, | Performed by: PSYCHIATRY & NEUROLOGY

## 2020-06-19 PROCEDURE — 3008F BODY MASS INDEX DOCD: CPT | Mod: CPTII,S$GLB,, | Performed by: PSYCHIATRY & NEUROLOGY

## 2020-06-19 PROCEDURE — 99999 PR PBB SHADOW E&M-EST. PATIENT-LVL III: ICD-10-PCS | Mod: PBBFAC,,, | Performed by: PSYCHIATRY & NEUROLOGY

## 2020-06-19 RX ORDER — TOPIRAMATE 25 MG/1
TABLET ORAL
Qty: 60 TABLET | Refills: 0 | Status: SHIPPED | OUTPATIENT
Start: 2020-06-19 | End: 2021-03-26 | Stop reason: ALTCHOICE

## 2020-06-19 RX ORDER — ALMOTRIPTAN 12.5 MG/1
TABLET, FILM COATED ORAL
Qty: 10 TABLET | Refills: 11 | Status: SHIPPED | OUTPATIENT
Start: 2020-06-19 | End: 2020-06-20 | Stop reason: CLARIF

## 2020-06-19 RX ORDER — TOPIRAMATE 100 MG/1
100 CAPSULE, EXTENDED RELEASE ORAL DAILY
Qty: 30 CAPSULE | Refills: 3 | Status: SHIPPED | OUTPATIENT
Start: 2020-06-19 | End: 2020-07-09

## 2020-06-19 RX ORDER — ONDANSETRON 8 MG/1
8 TABLET, ORALLY DISINTEGRATING ORAL EVERY 12 HOURS PRN
Qty: 20 TABLET | Refills: 6 | Status: SHIPPED | OUTPATIENT
Start: 2020-06-19 | End: 2021-11-01 | Stop reason: SDUPTHER

## 2020-06-19 NOTE — PROGRESS NOTES
Subjective:       Patient ID: Maureen Barrera is a 24 y.o. female.    Chief Complaint: Headache    HPI   The patient is a pleasant 25 y/o female presenting for evaluation of headache. She has PMHx of Cholecystectomy, Anxiety, GERD, and Foot Surgery for wart removal and Fracture repair. She also has Hx of nasal polyps removal. She states that it was after the nasal surgery that she started having headaches. They have become more frequent and severe over the past 2 to 3 months. Currently, she is experiencing them on a weekly basis, lasting hours and requiring multiple doses of Advil with zofran as nausea is very pronounced. Attacks are very severe, 10/0-10, associated with nausea, photophobia, phonophobia, nausea and vomiting. The quality of pain is throbbing, severe pressure. Better with sleep and darkness, worse with noise, bright lights and fatigue as well as bending over and stress. Please see details of headache characteristics below.    Headache questionnaire     1. When did your Headaches start?               3-4 years ago         2. Where are your headaches located?              Forehead, behind eyes         3. Your headache's characteristics:              Pressure, Throbbing, Pounding        4. How long does the headache last?              hours        5. How often does the headache occur?              weekly        6. Are your headaches preceded or accompanied by other symptoms? yes              If yes, please describe.  Light sensitive, sound sensitive, nausea         7. Does the headache awaken you at night? no              If so, how often? n/a           8. Please amrik the word that best describes your headache's intensity:               moderate        9. Using a scale of 1 through 10, with 0 = no pain and 10 = the worst pain:              What score is your headache now? 0              What score is your headache at its worst? 10              What score is your headache at its best? 0           10.  Possible associated headache symptoms:  [x]?  Sensitivity to light              []? Dizziness                [x]? Nasal or sinus pressure/ pain          [x]? Sensitivity to noise             []? Vertigo                    []? Problems with concentration  []? Sensitivity to smells           []? Ringing in ears       []? Problems with memory                    []? Blurred vision                     []? Irritability                 []? Problems with task completion   []? Double vision                     []? Anger                      []?  Problems with relaxation  [x]? Loss of appetite                  [x]? Anxiety                   [x]? Neck tightness, Neck pain  [x]? Nausea                               []? Nasal congestion  [x]? Vomiting                                     11. Headache improving factors:  [x]? Sleep                      []? Heat  [x]? Darkness                []? Ice  []? Local pressure       []? Menses (period)  []? Massage                 []? Medications:          12. Headache worsening factors:   [x]? Fatigue       []? Sneezing                []? Changes in Weather  [x]? Light           [x]? Bending Over         [x]? Stress  [x]? Noise          []? Ovulation                []? Multiple Sclerosis Flare-Up  []? Smells        []? Menses                   []? Food   []? Coughing    []? Alcohol        13. Number of caffeinated drinks per day: 2        14. Number of diet drinks per day:  0           Please Check any Medications or Procedures tried/failed for Headache     AED Neuromodulators   MAOIs   Ergot Alkaloids     Acetazolamide (Diamox) []?  Phenelzine (Nardil) []?  Dihydroergotamine (Migranal) []?    Carbamazepine (Tegretol) []?  Tranylcypromine (Parnate) []?  Ergotamine (Ergomar) []?    Gabapentin (Neurontin) []?  Antihistamine/Serotonergic   Triptans     Lacosamide (Vimpat) []?  Cyproheptadine (Periactin) []?  Almotriptan (Axert) []?    Lamotrigine (Lamictal) []?  Antihypertensives    Eletriptan (Relpax) []?    Levatiracetam (Keppra) []?  Atenolol (Tenormin) []?  Frovatriptan (Frova) []?    Oxcarbazepine (Trileptal) []?  Bisoprostol (Zebeta) []?  Naratriptan (Amerge) []?    Phenobarbital []?  Candesartan (Atacand) []?  Rizatriptan (Maxalt) []?        Nebivolol (Bystolic)   Sumatriptan (Imitrex) []?    Levetiracetam (Keppra)   Cardeilol (Coreg) []?  Zolmitriptan (Zomig) []?    Phenytoin (Dilantin) []?  Diltiazem (Cardizem) []?        Pregabalin (Lyrica) []?  Lisinopril (Prinivil, Zestril) []?  Combo Abortives     Primidone (Mysoline) []?  Metoprolol (Toprol) []?  BC Powder []?    Tiagabine (Gabatril) []?  Nadolol (Corgard) []?  Butalbital and Acetaminophen (Bupap) []?    Topiramate (Topamax)  (Trokendi) []?  Nicardipine (Cardene) []?      Vigabatrin (Sabril) []?  Nimodipine (Nimotop) []?  Butalbital, Acetaminophen, and caffeine (Fioricet) []?    Valproic Acid (Depakote) (Divalproex Sodium) []?  Propranolol (Inderal) []?      Zonisamide (Zonegran) []?  Telmisartan (Micardis) []?  Butalbital, Aspirin, and caffeine (Fiorinal) []?    Benzodiazepines   Timolol (Blocadren) []?      Alprazolam (Xanax) []?  Verapamil (Calan, Verelan) []?  Butalbital, Caffeine, Acetaminophen, and Codeine (Fioricet with Codeine) []?    Diazepam (Valium) []?  NSAIDs       Lorazepam (Ativan) []?  Acetaminophen (Tylenol) [x]?      Clonazepam (Klonopin) []?  Acetylsalicylic Acid (Aspirin) []?  Butalbital, Caffeine, Aspirin, and Codeine  (Fiorinal with Codeine) []?    Antidepressants   Diclofenac (Cambia) []?      Amitriptyline (Elavil) []?  Ibuprofen (Motrin) []?      Desipramine (Norpramin) []?  Indomethacin (Indocin) []?  Aspirin, Caffeine, and Acetaminophen (Excedrin) (Goodys) [x]?    Doxepin (Sinequan) []?  Ketoprofen (Orudis) []?      Fluoxetine (Prozac) []?  Ketorolac (Toradol) []?  Acetaminophen, Dichloralphenazone, and Isometheptene (Midrin) []?    Imipramine (Tofranil) []?  Naproxen (Anaprox) (Aleve) [x]?       Nortriptyline (Pamelor) []?  Meclofenamic Acid (Meclomen) []?      Venlafaxine (Effexor) []?  Meloxicam (Mobic) []?  Procedures     Desvenlafazine (Pristiq) []?  Monoclonals   Greater occipital nerve block []?    Duloxetine (Cymbalta) []?  Eptinezumab []?  Cervical, Thoracic, Lumbar radiofrequency ablation []?    Trazadone []?  Erenumab-aooe (Aimovig) []?  Spenopalatine ganglion block []?    Wellbutrin []?  Galcanezumab (Emgality) []?  Occipital neuro stimulation []?    Protriptyline (Vivactil) []?  Fremanazumab-vfrm (Ajovy)   Cervical, Thoracic, Lumbar, Caudal Epidural steroid injection []?    Escitalopram (Lexapro) []?  Other []?  Sacroiliac joint steroid injection []?    Celexa []?  Memantine (Namenda) []?  Transforaminal epidural steroid injection []?        Botox []?  Facet joint injections []?        Baclofen (Lioresal) []?  Cervical, Thoracic, Lumbar medial branch blocks []?            Cefaly []?            Gamma Core []?            Iovera []?            Transcranial Magnetic stimulation []?                                        Review of Systems   Constitutional: Positive for appetite change and fatigue. Negative for activity change and fever.   HENT: Negative for congestion, dental problem, hearing loss, sinus pressure, tinnitus, trouble swallowing and voice change.    Eyes: Negative for photophobia, pain, redness and visual disturbance.   Respiratory: Negative for cough, chest tightness and shortness of breath.    Cardiovascular: Negative for chest pain, palpitations and leg swelling.   Gastrointestinal: Negative for abdominal pain, blood in stool, nausea and vomiting.   Endocrine: Negative for cold intolerance and heat intolerance.   Genitourinary: Negative for difficulty urinating, frequency, menstrual problem and urgency.   Musculoskeletal: Negative for arthralgias, back pain, gait problem, joint swelling, myalgias, neck pain and neck stiffness.   Skin: Negative.    Neurological: Positive for  headaches. Negative for dizziness, tremors, seizures, syncope, facial asymmetry, speech difficulty, weakness, light-headedness and numbness.   Hematological: Negative for adenopathy. Does not bruise/bleed easily.   Psychiatric/Behavioral: Negative for agitation, behavioral problems, confusion, decreased concentration, self-injury, sleep disturbance and suicidal ideas. The patient is not nervous/anxious and is not hyperactive.                Past Medical History:   Diagnosis Date    Anxiety     Eczema     Fracture of left foot 2010    GERD (gastroesophageal reflux disease)     Headache(784.0)     Lactose intolerance     Polyp of nasal cavity     PONV (postoperative nausea and vomiting)     Recurrent upper respiratory infection (URI)     Sprain of right ankle 10/26/11    Varicella     2nd grade     Past Surgical History:   Procedure Laterality Date    ARTHROSCOPY OF ANKLE WITH DEBRIDEMENT Left 7/11/2018    Procedure: ARTHROSCOPY, ANKLE, WITH DEBRIDEMENT;  Surgeon: Jimmie Red MD;  Location: [facility] OR;  Service: Orthopedics;  Laterality: Left;    LAPAROSCOPIC CHOLECYSTECTOMY N/A 7/10/2019    Procedure: CHOLECYSTECTOMY, LAPAROSCOPIC;  Surgeon: Carlos Alberto Baker MD;  Location: [facility] OR;  Service: General;  Laterality: N/A;    PLANTAR'S WART EXCISION  january 2015    POLYPECTOMY      Nasal    SPHENOIDECTOMY       Family History   Problem Relation Age of Onset    Allergies Father     Eczema Father     Immunodeficiency Father     Allergies Paternal Aunt     Eczema Paternal Aunt     Allergies Paternal Uncle     Eczema Paternal Uncle     Immunodeficiency Maternal Grandmother     Allergies Paternal Grandmother     Angioedema Paternal Grandmother     Eczema Paternal Grandmother     Allergies Paternal Grandfather     Eczema Paternal Grandfather     Allergic rhinitis Neg Hx     Asthma Neg Hx     Atopy Neg Hx     Rhinitis Neg Hx     Urticaria Neg Hx      Social History     Socioeconomic History     Marital status: Single     Spouse name: Not on file    Number of children: Not on file    Years of education: Not on file    Highest education level: Not on file   Occupational History    Occupation:    Social Needs    Financial resource strain: Not on file    Food insecurity     Worry: Not on file     Inability: Not on file    Transportation needs     Medical: Not on file     Non-medical: Not on file   Tobacco Use    Smoking status: Current Some Day Smoker     Types: Vaping w/o nicotine    Smokeless tobacco: Never Used    Tobacco comment: Vapes CBD oil   Substance and Sexual Activity    Alcohol use: Yes     Comment: once a month    Drug use: No    Sexual activity: Not Currently   Lifestyle    Physical activity     Days per week: Not on file     Minutes per session: Not on file    Stress: Not on file   Relationships    Social connections     Talks on phone: Not on file     Gets together: Not on file     Attends Jain service: Not on file     Active member of club or organization: Not on file     Attends meetings of clubs or organizations: Not on file     Relationship status: Not on file   Other Topics Concern    Not on file   Social History Narrative    Born in [place].  Moved from [place] in 2011.     Review of patient's allergies indicates:  No Known Allergies    Current Outpatient Medications:     albuterol (VENTOLIN HFA) 90 mcg/actuation inhaler, Inhale 2 puffs into the lungs every 6 (six) hours as needed for Wheezing. Rescue, Disp: 18 g, Rfl: 0    hydrOXYzine HCl (ATARAX) 25 MG tablet, Take 1 tablet (25 mg total) by mouth nightly., Disp: 30 tablet, Rfl: 4    norgestimate-ethinyl estradioL (TRI-LO-SPRINTEC) 0.18/0.215/0.25 mg-25 mcg tablet, Take 1 tablet by mouth once daily., Disp: 90 tablet, Rfl: 3    ondansetron (ZOFRAN-ODT) 8 MG TbDL, Take 1 tablet (8 mg total) by mouth every 12 (twelve) hours as needed., Disp: 20 tablet, Rfl: 0    almotriptan (AXERT) 12.5 MG tablet, 1  tab PO PRN migraine, may repeat in 2 hours if needed, max 2 tablets in 24 hours., Disp: 10 tablet, Rfl: 11    azithromycin (ZITHROMAX Z-MOIRA) 250 MG tablet, Take two tablets today then one daily until complete. (Patient not taking: Reported on 6/19/2020), Disp: 6 tablet, Rfl: 0    fluticasone propionate (FLONASE) 50 mcg/actuation nasal spray, 1 spray (50 mcg total) by Each Nostril route once daily. (Patient not taking: Reported on 6/19/2020), Disp: 1 Bottle, Rfl: 0    ondansetron (ZOFRAN-ODT) 8 MG TbDL, Take 1 tablet (8 mg total) by mouth every 12 (twelve) hours as needed., Disp: 20 tablet, Rfl: 6    predniSONE (DELTASONE) 20 MG tablet, Take 3 tablets for 2 days then 2 tablets for 2 days then 1 tablet for 2 days (Patient not taking: Reported on 1/23/2020), Disp: 12 tablet, Rfl: 0    topiramate (TOPAMAX) 25 MG tablet, Use 25 mg daily x1wk, then 25 mg bid x 1wk, then 1 qam and 2 qhs x1 wk. Then stop and start Trokendi 100 mg, Disp: 60 tablet, Rfl: 0    topiramate (TROKENDI XR) 100 mg Cp24, Take 1 capsule (100 mg total) by mouth once daily., Disp: 30 capsule, Rfl: 3      Objective:      Vitals:    06/19/20 0839   BP: 138/84   Pulse: 89   Resp: 16     Body mass index is 31.48 kg/m².    Physical Exam    Constitutional:   She appears well-developed and well-nourished. She is well groomed    HENT:    Head: Atraumatic, oral and nasal mucosa intact  Eyes: Conjunctivae and EOM are normal. Pupils are equal, round, and reactive to light OU  Neck: Neck supple. No thyromegaly present  Cardiovascular: Normal rate and normal heart sounds  No murmur heard  Pulmonary/Chest: Effort normal and breath sounds normal  Musculoskeletal: Normal range of motion. No joint stiffness. No vertebral point tenderness  Skin: Skin is warm and dry  Psychiatric: Normal mood and affect     Neuro exam:    Mental status:  Awake, attentive, Alert, oriented to self, place, year and month  Language function is intact    Cranial Nerves:  Smell was not  formally evaluated  Cranial Nerves II - XII: intact  Pursuits were smooth, normal saccades, no nystagmus OU  Funduscopic exam - disc were flat and pink, no exudates or hemorrhages OU  Motor - facial movement was symmetrical and normal     Palate moved well and was symmetrical with normal palatal and oral sensation  Tongue movements were full    Coordination:     Rapid alternating movements and rapid finger tapping - normal bilaterally  Finger to nose - normal and symmetric bilaterally   Heel to shin test - normal and symmetric bilaterally   Arm roll - smooth and symmetric   No intentional or positional tremor.     Motor:  Normal muscle bulk and symmetry. No fasciculations were noted    No pronator drift  Strength  5/5 bilaterally     Reflexes:  Tendon reflexes were 3 + at biceps, triceps, brachioradialis, patellar, and Achilles bilaterally  On plantar stimulation toes were down going bilaterally  No clonus was noted     Sensory: Intact to light touch, pin prick in all extremities. Vibration and proprioception intact in all extremities.     Gait: Romberg absent. Normal gait. Normal arm swing and turns. Good tandem    Review of Data:  Lab Results   Component Value Date     09/26/2019    K 4.6 09/26/2019     09/26/2019    CO2 22 09/26/2019    BUN 12 09/26/2019    CREATININE 0.84 09/26/2019    GLU 84 09/26/2019    HGBA1C 5.5 04/29/2017    AST 25 09/26/2019    ALT 51 (H) 09/26/2019    ALBUMIN 4.5 09/26/2019    PROT 7.4 09/26/2019    BILITOT 0.3 09/26/2019    CHOL 183 09/26/2019    HDL 48 09/26/2019    LDLCALC 110 (H) 09/26/2019    TRIG 124 09/26/2019       Lab Results   Component Value Date    WBC 8.1 09/26/2019    HGB 12.9 09/26/2019    HCT 39.8 09/26/2019    MCV 79 09/26/2019     09/26/2019       Lab Results   Component Value Date    TSH 1.696 04/29/2017         Assessment and Plan   The patient meets criteria for high frequency episodic migraine without aura. Normal neuro exam and no red flags, no  need for imaging at this time.   She qualifies for preventive treatment. I have selected Trokendi 100 mg daily. Will use IR topiramate to titrate (25 to 25 bid to 25 -50, then Trokendi 100 mg QHS  For acute Attacks I have selected Almotriptan 12.5 mg which is well tolerated. Add Zofran as needed. May combine with Advil.  Keep diary   RTC in 3 months.  I have discussed the side effects of the medications prescribed and the patient acknowledges understanding        Chloe Robison M.D  Medical Director, Headache and Facial Pain  [facility]

## 2020-06-19 NOTE — PROGRESS NOTES
Headache questionnaire    1. When did your Headaches start?    3-4 years ago       2. Where are your headaches located?   Forehead, behind eyes       3. Your headache's characteristics:   Pressure, Throbbing, Pounding      4. How long does the headache last?   hours      5. How often does the headache occur?   weekly      6. Are your headaches preceded or accompanied by other symptoms? yes   If yes, please describe.  Light sensitive, sound sensitive, nausea       7. Does the headache awaken you at night? no   If so, how often? n/a        8. Please amrik the word that best describes your headache's intensity:    moderate      9. Using a scale of 1 through 10, with 0 = no pain and 10 = the worst pain:   What score is your headache now? 0   What score is your headache at its worst? 10   What score is your headache at its best? 0        10. Possible associated headache symptoms:  [x]  Sensitivity to light  [] Dizziness  [x] Nasal or sinus pressure/ pain   [x] Sensitivity to noise  [] Vertigo  [] Problems with concentration  [] Sensitivity to smells  [] Ringing in ears  [] Problems with memory    [] Blurred vision  [] Irritability  [] Problems with task completion   [] Double vision  [] Anger  []  Problems with relaxation  [x] Loss of appetite  [x] Anxiety  [x] Neck tightness, Neck pain  [x] Nausea   [] Nasal congestion  [x] Vomiting         11. Headache improving factors:  [x] Sleep  [] Heat  [x] Darkness  [] Ice  [] Local pressure [] Menses (period)  [] Massage   [] Medications:        12. Headache worsening factors:   [x] Fatigue [] Sneezing  [] Changes in Weather  [x] Light [x] Bending Over [x] Stress  [x] Noise [] Ovulation  [] Multiple Sclerosis Flare-Up  [] Smells  [] Menses  [] Food   [] Coughing [] Alcohol      13. Number of caffeinated drinks per day: 2      14. Number of diet drinks per day:  0      15. Have you seen any other [facility]ists within the last 3 years?  no        Please Check any Medications  or Procedures tried/failed for Headache    AED Neuromodulators  MAOIs  Ergot Alkaloids    Acetazolamide (Diamox) [] Phenelzine (Nardil) [] Dihydroergotamine (Migranal) []   Carbamazepine (Tegretol) [] Tranylcypromine (Parnate) [] Ergotamine (Ergomar) []   Gabapentin (Neurontin) [] Antihistamine/Serotonergic  Triptans    Lacosamide (Vimpat) [] Cyproheptadine (Periactin) [] Almotriptan (Axert) []   Lamotrigine (Lamictal) [] Antihypertensives  Eletriptan (Relpax) []   Levatiracetam (Keppra) [] Atenolol (Tenormin) [] Frovatriptan (Frova) []   Oxcarbazepine (Trileptal) [] Bisoprostol (Zebeta) [] Naratriptan (Amerge) []   Phenobarbital [] Candesartan (Atacand) [] Rizatriptan (Maxalt) []     Nebivolol (Bystolic)  Sumatriptan (Imitrex) []   Levetiracetam (Keppra)  Cardeilol (Coreg) [] Zolmitriptan (Zomig) []   Phenytoin (Dilantin) [] Diltiazem (Cardizem) []     Pregabalin (Lyrica) [] Lisinopril (Prinivil, Zestril) [] Combo Abortives    Primidone (Mysoline) [] Metoprolol (Toprol) [] BC Powder []   Tiagabine (Gabatril) [] Nadolol (Corgard) [] Butalbital and Acetaminophen (Bupap) []   Topiramate (Topamax)  (Trokendi) [] Nicardipine (Cardene) []     Vigabatrin (Sabril) [] Nimodipine (Nimotop) [] Butalbital, Acetaminophen, and caffeine (Fioricet) []   Valproic Acid (Depakote) (Divalproex Sodium) [] Propranolol (Inderal) []     Zonisamide (Zonegran) [] Telmisartan (Micardis) [] Butalbital, Aspirin, and caffeine (Fiorinal) []   Benzodiazepines  Timolol (Blocadren) []     Alprazolam (Xanax) [] Verapamil (Calan, Verelan) [] Butalbital, Caffeine, Acetaminophen, and Codeine (Fioricet with Codeine) []   Diazepam (Valium) [] NSAIDs      Lorazepam (Ativan) [] Acetaminophen (Tylenol) [x]     Clonazepam (Klonopin) [] Acetylsalicylic Acid (Aspirin) [] Butalbital, Caffeine, Aspirin, and Codeine  (Fiorinal with Codeine) []   Antidepressants  Diclofenac (Cambia) []     Amitriptyline (Elavil) [] Ibuprofen (Motrin) []     Desipramine  (Norpramin) [] Indomethacin (Indocin) [] Aspirin, Caffeine, and Acetaminophen (Excedrin) (Goodys) [x]   Doxepin (Sinequan) [] Ketoprofen (Orudis) []     Fluoxetine (Prozac) [] Ketorolac (Toradol) [] Acetaminophen, Dichloralphenazone, and Isometheptene (Midrin) []   Imipramine (Tofranil) [] Naproxen (Anaprox) (Aleve) [x]     Nortriptyline (Pamelor) [] Meclofenamic Acid (Meclomen) []     Venlafaxine (Effexor) [] Meloxicam (Mobic) [] Procedures    Desvenlafazine (Pristiq) [] Monoclonals  Greater occipital nerve block []   Duloxetine (Cymbalta) [] Eptinezumab [] Cervical, Thoracic, Lumbar radiofrequency ablation []   Trazadone [] Erenumab-aooe (Aimovig) [] Spenopalatine ganglion block []   Wellbutrin [] Galcanezumab (Emgality) [] Occipital neuro stimulation []   Protriptyline (Vivactil) [] Fremanazumab-vfrm (Ajovy)  Cervical, Thoracic, Lumbar, Caudal Epidural steroid injection []   Escitalopram (Lexapro) [] Other [] Sacroiliac joint steroid injection []   Celexa [] Memantine (Namenda) [] Transforaminal epidural steroid injection []     Botox [] Facet joint injections []     Baclofen (Lioresal) [] Cervical, Thoracic, Lumbar medial branch blocks []       Cefaly []       Gamma Core []       Iovera []       Transcranial Magnetic stimulation []

## 2020-06-20 RX ORDER — SUMATRIPTAN 50 MG/1
TABLET, FILM COATED ORAL
Qty: 9 TABLET | Refills: 11 | Status: SHIPPED | OUTPATIENT
Start: 2020-06-20 | End: 2021-03-26 | Stop reason: ALTCHOICE

## 2020-06-24 ENCOUNTER — TELEPHONE (OUTPATIENT)
Dept: FAMILY MEDICINE | Facility: CLINIC | Age: 25
End: 2020-06-24

## 2020-06-24 NOTE — TELEPHONE ENCOUNTER
----- Message from Jaqui Valles MD sent at 6/24/2020  8:36 AM CDT -----  Regarding: FW: appt    ----- Message -----  From: Paulette Hayes  Sent: 6/23/2020   6:49 PM CDT  To: Jaqui Valles MD  Subject: appt                                             Pt would like a call back regarding getting an appt. Pt stated she's been trying to get back in with Dr. Valles, Pt needs documentation for school. And previous provider Dr. Palacios is no longer available.    Pt can be reached at [phone]

## 2020-07-09 ENCOUNTER — OFFICE VISIT (OUTPATIENT)
Dept: FAMILY MEDICINE | Facility: CLINIC | Age: 25
End: 2020-07-09
Payer: COMMERCIAL

## 2020-07-09 VITALS
TEMPERATURE: 99 F | HEIGHT: 63 IN | HEART RATE: 84 BPM | RESPIRATION RATE: 14 BRPM | BODY MASS INDEX: 31.66 KG/M2 | DIASTOLIC BLOOD PRESSURE: 82 MMHG | SYSTOLIC BLOOD PRESSURE: 110 MMHG | WEIGHT: 178.69 LBS

## 2020-07-09 DIAGNOSIS — R06.2 WHEEZING: ICD-10-CM

## 2020-07-09 DIAGNOSIS — Z23 IMMUNIZATION DUE: ICD-10-CM

## 2020-07-09 DIAGNOSIS — Z00.00 ROUTINE GENERAL MEDICAL EXAMINATION AT A HEALTH CARE FACILITY: Primary | ICD-10-CM

## 2020-07-09 PROCEDURE — 99395 PR PREVENTIVE VISIT,EST,18-39: ICD-10-PCS | Mod: S$GLB,,, | Performed by: FAMILY MEDICINE

## 2020-07-09 PROCEDURE — 99999 PR PBB SHADOW E&M-EST. PATIENT-LVL III: CPT | Mod: PBBFAC,,, | Performed by: FAMILY MEDICINE

## 2020-07-09 PROCEDURE — 99395 PREV VISIT EST AGE 18-39: CPT | Mod: S$GLB,,, | Performed by: FAMILY MEDICINE

## 2020-07-09 PROCEDURE — 99999 PR PBB SHADOW E&M-EST. PATIENT-LVL III: ICD-10-PCS | Mod: PBBFAC,,, | Performed by: FAMILY MEDICINE

## 2020-07-09 RX ORDER — ALBUTEROL SULFATE 90 UG/1
2 AEROSOL, METERED RESPIRATORY (INHALATION) EVERY 6 HOURS PRN
Qty: 18 G | Refills: 0 | Status: CANCELLED | OUTPATIENT
Start: 2020-07-09 | End: 2021-07-09

## 2020-07-09 RX ORDER — MULTIVITAMIN
1 TABLET ORAL DAILY
COMMUNITY
End: 2022-07-26

## 2020-07-09 NOTE — PROGRESS NOTES
Subjective:       Patient ID: Maureen Barrera is a 24 y.o. female.    Chief Complaint: Annual Exam (school physical. yearly. Form to sign for handicap park tag.)      Maureen Barrera is in the office for annual exam.    HPI  Since LOV, no major health updates. 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   Ongoing issues with migraines. Seeing neuro/robin re: migraines. Improvement now with topamax and imitrex - 2 weeks on regimen.   Past Medical History:   Diagnosis Date    Anxiety     Eczema     Fracture of left foot 2010    GERD (gastroesophageal reflux disease)     Headache(784.0)     Lactose intolerance     Polyp of nasal cavity     PONV (postoperative nausea and vomiting)     Recurrent upper respiratory infection (URI)     Sprain of right ankle 10/26/11    Varicella     2nd grade     Reviewed vaccinations to date.     Current Outpatient Medications:     albuterol (VENTOLIN HFA) 90 mcg/actuation inhaler, Inhale 2 puffs into the lungs every 6 (six) hours as needed for Wheezing. Rescue, Disp: 18 g, Rfl: 0    hydrOXYzine HCl (ATARAX) 25 MG tablet, Take 1 tablet (25 mg total) by mouth nightly., Disp: 30 tablet, Rfl: 4    multivitamin (ONE DAILY MULTIVITAMIN) per tablet, Take 1 tablet by mouth once daily., Disp: , Rfl:     norgestimate-ethinyl estradioL (TRI-LO-SPRINTEC) 0.18/0.215/0.25 mg-25 mcg tablet, Take 1 tablet by mouth once daily., Disp: 90 tablet, Rfl: 3    ondansetron (ZOFRAN-ODT) 8 MG TbDL, Take 1 tablet (8 mg total) by mouth every 12 (twelve) hours as needed., Disp: 20 tablet, Rfl: 6    sumatriptan (IMITREX) 50 MG tablet, Take 1 at onset of headache, may repeat in 2 hours to a max of 3 per day, 2 days per week, Disp: 9 tablet, Rfl: 11    topiramate (TOPAMAX) 25 MG tablet, Use 25 mg daily x1wk, then 25 mg bid x 1wk, then 1 qam and 2 qhs x1 wk. Then stop and start Trokendi 100 mg (Patient taking differently: Take 25 mg by mouth 2 (two) times daily. Use 25 mg daily x1wk, then 25 mg bid x  1wk, then 1 qam and 2 qhs x1 wk. Then stop and start Trokendi 100 mg), Disp: 60 tablet, Rfl: 0    The ASCVD Risk score ([place] BRENNEN Jr., et al., 2013) failed to calculate for the following reasons:    The 2013 ASCVD risk score is only valid for ages 40 to 79     Lab Results   Component Value Date    HGBA1C 5.5 04/29/2017    HGBA1C 5.4 12/19/2014    HGBA1C 5.7 08/10/2013     Lab Results   Component Value Date    LDLCALC 110 (H) 09/26/2019    CREATININE 0.84 09/26/2019   labs 2019 rev.    Review of Systems   Constitutional: Negative for activity change, fatigue and unexpected weight change (stable with exercise).   HENT: Negative for congestion, postnasal drip, rhinorrhea and sore throat.    Eyes: Negative for discharge and visual disturbance.   Respiratory: Negative for cough and wheezing (occ albuterol use with exercise).    Cardiovascular: Negative for chest pain and palpitations.   Gastrointestinal: Negative for abdominal pain, constipation, diarrhea and nausea.        Previous stomach sx are improved after GB surgery   Genitourinary: Negative for difficulty urinating, frequency and menstrual problem (on ocps).   Musculoskeletal: Negative for arthralgias (ankle will bother with some activities) and back pain.   Skin: Negative for color change and rash.   Neurological: Negative for dizziness and headaches.   Psychiatric/Behavioral: Negative for dysphoric mood and sleep disturbance (uses hydroxyzine prn for sleep). The patient is not nervous/anxious.          Objective:      Physical Exam  Vitals signs and nursing note reviewed.   Constitutional:       General: She is not in acute distress.     Appearance: She is well-developed.   HENT:      Head: Normocephalic and atraumatic.      Right Ear: Tympanic membrane and external ear normal.      Left Ear: Tympanic membrane and external ear normal.      Nose: Nose normal.      Mouth/Throat:      Pharynx: No oropharyngeal exudate.   Eyes:      Conjunctiva/sclera: Conjunctivae  normal.      Pupils: Pupils are equal, round, and reactive to light.   Neck:      Musculoskeletal: Normal range of motion and neck supple.      Thyroid: No thyromegaly.   Cardiovascular:      Rate and Rhythm: Normal rate and regular rhythm.   Pulmonary:      Effort: Pulmonary effort is normal. No respiratory distress.      Breath sounds: Normal breath sounds. No wheezing.   Abdominal:      General: Bowel sounds are normal. There is no distension.      Palpations: Abdomen is soft. There is no mass.      Tenderness: There is no abdominal tenderness. There is no guarding or rebound.   Musculoskeletal: Normal range of motion.      Right lower leg: No edema.      Left lower leg: No edema.   Lymphadenopathy:      Cervical: No cervical adenopathy.   Skin:     General: Skin is warm and dry.   Neurological:      General: No focal deficit present.      Mental Status: She is alert and oriented to person, place, and time.      Cranial Nerves: No cranial nerve deficit.   Psychiatric:         Mood and Affect: Mood normal.         Behavior: Behavior normal.             Screening recommendations appropriate to age and health status were reviewed.    Assessment & Plan:    Routine general medical examination at a health care facility  Comments:  anticipatory guidance reviewed  Orders:  -     CBC Without Differential; Future; Expected date: 07/09/2020  -     Comprehensive metabolic panel; Future; Expected date: 07/09/2020  -     Lipid Panel; Future; Expected date: 07/09/2020  -     TSH; Future; Expected date: 07/09/2020  -     Hemoglobin A1C; Future; Expected date: 07/09/2020  -     C. trachomatis/N. gonorrhoeae by AMP DNA [facility]; Urine    Wheezing  Comments:  albuterol as needed, before exercise, call for refill prn    Immunization due  Comments:  pt intends to get gardasil at her pharmacy due to potential ins (cost)    Other orders  -     hpv vaccine,9-rafael (GARDASIL 9, PF,) 0.5 mL Syrg; Inject 0.5 mLs into the muscle once. for 1  dose  Dispense: 0.5 mL; Refill: 0

## 2020-07-23 ENCOUNTER — TELEPHONE (OUTPATIENT)
Dept: FAMILY MEDICINE | Facility: CLINIC | Age: 25
End: 2020-07-23

## 2020-07-23 ENCOUNTER — LAB VISIT (OUTPATIENT)
Dept: LAB | Facility: HOSPITAL | Age: 25
End: 2020-07-23
Attending: FAMILY MEDICINE
Payer: COMMERCIAL

## 2020-07-23 DIAGNOSIS — Z84.81 FAMILY HISTORY OF BRCA GENE MUTATION: ICD-10-CM

## 2020-07-23 PROCEDURE — 36415 COLL VENOUS BLD VENIPUNCTURE: CPT | Mod: PN

## 2020-07-23 NOTE — TELEPHONE ENCOUNTER
----- Message from Gina Vang sent at 7/23/2020  9:23 AM CDT -----  Contact: Maureen at [phone]  Type: Needs Medical Advice  Who Called:  Maureen  Best Call Back Number: [phone]  Additional Information: Pt is stating [facility] did not receive her urine order.They took the sample and holding for her but still needs order.

## 2020-07-24 LAB
ALBUMIN SERPL-MCNC: 4.2 G/DL (ref 3.9–5)
ALBUMIN/GLOB SERPL: 1.4 {RATIO} (ref 1.2–2.2)
ALP SERPL-CCNC: 73 IU/L (ref 39–117)
ALT SERPL-CCNC: 50 IU/L (ref 0–32)
AST SERPL-CCNC: 28 IU/L (ref 0–40)
BILIRUB SERPL-MCNC: <0.2 MG/DL (ref 0–1.2)
BUN SERPL-MCNC: 13 MG/DL (ref 6–20)
BUN/CREAT SERPL: 18 (ref 9–23)
CALCIUM SERPL-MCNC: 9.4 MG/DL (ref 8.7–10.2)
CHLORIDE SERPL-SCNC: 106 MMOL/L (ref 96–106)
CHOLEST SERPL-MCNC: 170 MG/DL (ref 100–199)
CO2 SERPL-SCNC: 21 MMOL/L (ref 20–29)
CREAT SERPL-MCNC: 0.72 MG/DL (ref 0.57–1)
ERYTHROCYTE [DISTWIDTH] IN BLOOD BY AUTOMATED COUNT: 12.7 % (ref 11.7–15.4)
GLOBULIN SER CALC-MCNC: 3 G/DL (ref 1.5–4.5)
GLUCOSE SERPL-MCNC: 87 MG/DL (ref 65–99)
HBA1C MFR BLD: 5.3 % (ref 4.8–5.6)
HCT VFR BLD AUTO: 37.6 % (ref 34–46.6)
HDLC SERPL-MCNC: 47 MG/DL
HGB BLD-MCNC: 12.1 G/DL (ref 11.1–15.9)
LDLC SERPL CALC-MCNC: 111 MG/DL (ref 0–99)
MCH RBC QN AUTO: 26.2 PG (ref 26.6–33)
MCHC RBC AUTO-ENTMCNC: 32.2 G/DL (ref 31.5–35.7)
MCV RBC AUTO: 81 FL (ref 79–97)
PLATELET # BLD AUTO: 401 X10E3/UL (ref 150–450)
POTASSIUM SERPL-SCNC: 4.5 MMOL/L (ref 3.5–5.2)
PROT SERPL-MCNC: 7.2 G/DL (ref 6–8.5)
RBC # BLD AUTO: 4.62 X10E6/UL (ref 3.77–5.28)
SODIUM SERPL-SCNC: 141 MMOL/L (ref 134–144)
TRIGL SERPL-MCNC: 59 MG/DL (ref 0–149)
TSH SERPL DL<=0.005 MIU/L-ACNC: 2.06 UIU/ML (ref 0.45–4.5)
VLDLC SERPL CALC-MCNC: 12 MG/DL (ref 5–40)
WBC # BLD AUTO: 8 X10E3/UL (ref 3.4–10.8)

## 2020-08-05 LAB — INTEGRATED BRAC ANALYSIS: NORMAL

## 2021-02-22 DIAGNOSIS — R06.2 WHEEZING: ICD-10-CM

## 2021-02-22 RX ORDER — ALBUTEROL SULFATE 90 UG/1
AEROSOL, METERED RESPIRATORY (INHALATION)
Qty: 18 G | Refills: 3 | Status: SHIPPED | OUTPATIENT
Start: 2021-02-22 | End: 2022-04-25 | Stop reason: SDUPTHER

## 2021-03-23 ENCOUNTER — PATIENT MESSAGE (OUTPATIENT)
Dept: OBSTETRICS AND GYNECOLOGY | Facility: CLINIC | Age: 26
End: 2021-03-23

## 2021-03-24 ENCOUNTER — PATIENT OUTREACH (OUTPATIENT)
Dept: ADMINISTRATIVE | Facility: OTHER | Age: 26
End: 2021-03-24

## 2021-03-26 ENCOUNTER — OFFICE VISIT (OUTPATIENT)
Dept: OBSTETRICS AND GYNECOLOGY | Facility: CLINIC | Age: 26
End: 2021-03-26
Payer: COMMERCIAL

## 2021-03-26 ENCOUNTER — PATIENT MESSAGE (OUTPATIENT)
Dept: FAMILY MEDICINE | Facility: CLINIC | Age: 26
End: 2021-03-26

## 2021-03-26 VITALS
SYSTOLIC BLOOD PRESSURE: 118 MMHG | BODY MASS INDEX: 33.87 KG/M2 | WEIGHT: 191.13 LBS | DIASTOLIC BLOOD PRESSURE: 80 MMHG | HEIGHT: 63 IN

## 2021-03-26 DIAGNOSIS — Z01.419 GYNECOLOGIC EXAM NORMAL: Primary | ICD-10-CM

## 2021-03-26 PROCEDURE — 1126F PR PAIN SEVERITY QUANTIFIED, NO PAIN PRESENT: ICD-10-PCS | Mod: S$GLB,,, | Performed by: OBSTETRICS & GYNECOLOGY

## 2021-03-26 PROCEDURE — 99999 PR PBB SHADOW E&M-EST. PATIENT-LVL III: ICD-10-PCS | Mod: PBBFAC,,, | Performed by: OBSTETRICS & GYNECOLOGY

## 2021-03-26 PROCEDURE — 3008F PR BODY MASS INDEX (BMI) DOCUMENTED: ICD-10-PCS | Mod: CPTII,S$GLB,, | Performed by: OBSTETRICS & GYNECOLOGY

## 2021-03-26 PROCEDURE — 99999 PR PBB SHADOW E&M-EST. PATIENT-LVL III: CPT | Mod: PBBFAC,,, | Performed by: OBSTETRICS & GYNECOLOGY

## 2021-03-26 PROCEDURE — 99395 PREV VISIT EST AGE 18-39: CPT | Mod: S$GLB,,, | Performed by: OBSTETRICS & GYNECOLOGY

## 2021-03-26 PROCEDURE — 1126F AMNT PAIN NOTED NONE PRSNT: CPT | Mod: S$GLB,,, | Performed by: OBSTETRICS & GYNECOLOGY

## 2021-03-26 PROCEDURE — 3008F BODY MASS INDEX DOCD: CPT | Mod: CPTII,S$GLB,, | Performed by: OBSTETRICS & GYNECOLOGY

## 2021-03-26 PROCEDURE — 99395 PR PREVENTIVE VISIT,EST,18-39: ICD-10-PCS | Mod: S$GLB,,, | Performed by: OBSTETRICS & GYNECOLOGY

## 2021-03-26 PROCEDURE — 88175 CYTOPATH C/V AUTO FLUID REDO: CPT | Performed by: OBSTETRICS & GYNECOLOGY

## 2021-03-26 RX ORDER — ASCORBIC ACID 125 MG
TABLET,CHEWABLE ORAL
COMMUNITY
End: 2023-05-02

## 2021-03-26 RX ORDER — NORGESTIMATE AND ETHINYL ESTRADIOL 7DAYSX3 LO
1 KIT ORAL DAILY
Qty: 84 TABLET | Refills: 3 | Status: SHIPPED | OUTPATIENT
Start: 2021-03-26 | End: 2022-01-24

## 2021-03-26 RX ORDER — TALC
POWDER (GRAM) TOPICAL
COMMUNITY
Start: 2020-01-23

## 2021-04-05 LAB
CLINICAL INFO: NORMAL
CYTO CVX: NORMAL
CYTOLOGIST CVX/VAG CYTO: NORMAL
CYTOLOGIST CVX/VAG CYTO: NORMAL
CYTOLOGY CMNT CVX/VAG CYTO-IMP: NORMAL
CYTOLOGY PAP THIN PREP EXPLANATION: NORMAL
DATE OF PREVIOUS PAP: NORMAL
DATE PREVIOUS BX: NO
LMP START DATE: NORMAL
SPECIMEN SOURCE CVX/VAG CYTO: NORMAL
STAT OF ADQ CVX/VAG CYTO-IMP: NORMAL

## 2021-04-07 ENCOUNTER — PATIENT MESSAGE (OUTPATIENT)
Dept: FAMILY MEDICINE | Facility: CLINIC | Age: 26
End: 2021-04-07

## 2021-04-13 ENCOUNTER — PATIENT MESSAGE (OUTPATIENT)
Dept: FAMILY MEDICINE | Facility: CLINIC | Age: 26
End: 2021-04-13

## 2021-04-22 ENCOUNTER — TELEPHONE (OUTPATIENT)
Dept: FAMILY MEDICINE | Facility: CLINIC | Age: 26
End: 2021-04-22

## 2021-04-26 ENCOUNTER — OFFICE VISIT (OUTPATIENT)
Dept: FAMILY MEDICINE | Facility: CLINIC | Age: 26
End: 2021-04-26
Payer: COMMERCIAL

## 2021-04-26 DIAGNOSIS — F41.9 ANXIETY: Primary | ICD-10-CM

## 2021-04-26 PROCEDURE — 99213 PR OFFICE/OUTPT VISIT, EST, LEVL III, 20-29 MIN: ICD-10-PCS | Mod: 95,,, | Performed by: FAMILY MEDICINE

## 2021-04-26 PROCEDURE — 99213 OFFICE O/P EST LOW 20 MIN: CPT | Mod: 95,,, | Performed by: FAMILY MEDICINE

## 2021-04-26 RX ORDER — FLUOXETINE 10 MG/1
10 CAPSULE ORAL DAILY
Qty: 30 CAPSULE | Refills: 1 | Status: SHIPPED | OUTPATIENT
Start: 2021-04-26 | End: 2021-06-21

## 2021-05-24 ENCOUNTER — TELEPHONE (OUTPATIENT)
Dept: FAMILY MEDICINE | Facility: CLINIC | Age: 26
End: 2021-05-24

## 2021-05-25 ENCOUNTER — PATIENT MESSAGE (OUTPATIENT)
Dept: FAMILY MEDICINE | Facility: CLINIC | Age: 26
End: 2021-05-25

## 2021-06-19 ENCOUNTER — PATIENT MESSAGE (OUTPATIENT)
Dept: FAMILY MEDICINE | Facility: CLINIC | Age: 26
End: 2021-06-19

## 2021-06-21 RX ORDER — FLUOXETINE 10 MG/1
10 CAPSULE ORAL DAILY
Qty: 30 CAPSULE | Refills: 1 | OUTPATIENT
Start: 2021-06-21 | End: 2022-06-21

## 2021-06-21 RX ORDER — FLUOXETINE 10 MG/1
CAPSULE ORAL
Qty: 90 CAPSULE | Refills: 1 | Status: SHIPPED | OUTPATIENT
Start: 2021-06-21 | End: 2021-08-20

## 2021-06-23 ENCOUNTER — TELEPHONE (OUTPATIENT)
Dept: FAMILY MEDICINE | Facility: CLINIC | Age: 26
End: 2021-06-23

## 2021-06-24 ENCOUNTER — PATIENT MESSAGE (OUTPATIENT)
Dept: FAMILY MEDICINE | Facility: CLINIC | Age: 26
End: 2021-06-24

## 2021-07-27 ENCOUNTER — TELEPHONE (OUTPATIENT)
Dept: FAMILY MEDICINE | Facility: CLINIC | Age: 26
End: 2021-07-27

## 2021-08-04 ENCOUNTER — OFFICE VISIT (OUTPATIENT)
Dept: FAMILY MEDICINE | Facility: CLINIC | Age: 26
End: 2021-08-04
Payer: COMMERCIAL

## 2021-08-04 VITALS
OXYGEN SATURATION: 98 % | BODY MASS INDEX: 34.85 KG/M2 | SYSTOLIC BLOOD PRESSURE: 122 MMHG | TEMPERATURE: 99 F | HEART RATE: 82 BPM | HEIGHT: 63 IN | RESPIRATION RATE: 18 BRPM | DIASTOLIC BLOOD PRESSURE: 78 MMHG | WEIGHT: 196.69 LBS

## 2021-08-04 DIAGNOSIS — R63.5 WEIGHT GAIN: ICD-10-CM

## 2021-08-04 DIAGNOSIS — R09.81 NASAL CONGESTION: Primary | ICD-10-CM

## 2021-08-04 DIAGNOSIS — J30.9 ALLERGIC RHINITIS, UNSPECIFIED SEASONALITY, UNSPECIFIED TRIGGER: ICD-10-CM

## 2021-08-04 DIAGNOSIS — Z11.3 SCREENING EXAMINATION FOR STD (SEXUALLY TRANSMITTED DISEASE): ICD-10-CM

## 2021-08-04 DIAGNOSIS — G47.00 INSOMNIA, UNSPECIFIED TYPE: ICD-10-CM

## 2021-08-04 PROCEDURE — 3078F PR MOST RECENT DIASTOLIC BLOOD PRESSURE < 80 MM HG: ICD-10-PCS | Mod: S$GLB,,, | Performed by: NURSE PRACTITIONER

## 2021-08-04 PROCEDURE — 3008F PR BODY MASS INDEX (BMI) DOCUMENTED: ICD-10-PCS | Mod: S$GLB,,, | Performed by: NURSE PRACTITIONER

## 2021-08-04 PROCEDURE — 1159F MED LIST DOCD IN RCRD: CPT | Mod: S$GLB,,, | Performed by: NURSE PRACTITIONER

## 2021-08-04 PROCEDURE — 3078F DIAST BP <80 MM HG: CPT | Mod: S$GLB,,, | Performed by: NURSE PRACTITIONER

## 2021-08-04 PROCEDURE — 99214 OFFICE O/P EST MOD 30 MIN: CPT | Mod: S$GLB,,, | Performed by: NURSE PRACTITIONER

## 2021-08-04 PROCEDURE — 1159F PR MEDICATION LIST DOCUMENTED IN MEDICAL RECORD: ICD-10-PCS | Mod: S$GLB,,, | Performed by: NURSE PRACTITIONER

## 2021-08-04 PROCEDURE — 3074F PR MOST RECENT SYSTOLIC BLOOD PRESSURE < 130 MM HG: ICD-10-PCS | Mod: S$GLB,,, | Performed by: NURSE PRACTITIONER

## 2021-08-04 PROCEDURE — 1160F PR REVIEW ALL MEDS BY PRESCRIBER/CLIN PHARMACIST DOCUMENTED: ICD-10-PCS | Mod: S$GLB,,, | Performed by: NURSE PRACTITIONER

## 2021-08-04 PROCEDURE — 1160F RVW MEDS BY RX/DR IN RCRD: CPT | Mod: S$GLB,,, | Performed by: NURSE PRACTITIONER

## 2021-08-04 PROCEDURE — 99214 PR OFFICE/OUTPT VISIT, EST, LEVL IV, 30-39 MIN: ICD-10-PCS | Mod: S$GLB,,, | Performed by: NURSE PRACTITIONER

## 2021-08-04 PROCEDURE — 3008F BODY MASS INDEX DOCD: CPT | Mod: S$GLB,,, | Performed by: NURSE PRACTITIONER

## 2021-08-04 PROCEDURE — 3074F SYST BP LT 130 MM HG: CPT | Mod: S$GLB,,, | Performed by: NURSE PRACTITIONER

## 2021-08-04 RX ORDER — HYDROXYZINE HYDROCHLORIDE 10 MG/1
10 TABLET, FILM COATED ORAL NIGHTLY PRN
Qty: 30 TABLET | Refills: 3 | Status: SHIPPED | OUTPATIENT
Start: 2021-08-04 | End: 2022-04-25

## 2021-08-04 RX ORDER — AZELASTINE 1 MG/ML
1 SPRAY, METERED NASAL 2 TIMES DAILY
Qty: 30 ML | Refills: 2 | Status: SHIPPED | OUTPATIENT
Start: 2021-08-04 | End: 2021-11-01

## 2021-08-18 ENCOUNTER — PATIENT MESSAGE (OUTPATIENT)
Dept: FAMILY MEDICINE | Facility: CLINIC | Age: 26
End: 2021-08-18

## 2021-08-20 RX ORDER — FLUOXETINE HYDROCHLORIDE 20 MG/1
20 CAPSULE ORAL DAILY
Qty: 30 CAPSULE | Refills: 3 | Status: SHIPPED | OUTPATIENT
Start: 2021-08-20 | End: 2022-02-21

## 2021-10-01 ENCOUNTER — OFFICE VISIT (OUTPATIENT)
Dept: OBSTETRICS AND GYNECOLOGY | Facility: CLINIC | Age: 26
End: 2021-10-01
Payer: COMMERCIAL

## 2021-10-01 VITALS
SYSTOLIC BLOOD PRESSURE: 114 MMHG | BODY MASS INDEX: 36.39 KG/M2 | DIASTOLIC BLOOD PRESSURE: 70 MMHG | HEIGHT: 63 IN | WEIGHT: 205.38 LBS

## 2021-10-01 DIAGNOSIS — Z84.81 FAMILY HISTORY OF BRCA GENE MUTATION: ICD-10-CM

## 2021-10-01 DIAGNOSIS — Z30.09: Primary | ICD-10-CM

## 2021-10-01 PROCEDURE — 3078F PR MOST RECENT DIASTOLIC BLOOD PRESSURE < 80 MM HG: ICD-10-PCS | Mod: S$GLB,,, | Performed by: OBSTETRICS & GYNECOLOGY

## 2021-10-01 PROCEDURE — 3078F DIAST BP <80 MM HG: CPT | Mod: S$GLB,,, | Performed by: OBSTETRICS & GYNECOLOGY

## 2021-10-01 PROCEDURE — 1159F PR MEDICATION LIST DOCUMENTED IN MEDICAL RECORD: ICD-10-PCS | Mod: S$GLB,,, | Performed by: OBSTETRICS & GYNECOLOGY

## 2021-10-01 PROCEDURE — 3008F PR BODY MASS INDEX (BMI) DOCUMENTED: ICD-10-PCS | Mod: S$GLB,,, | Performed by: OBSTETRICS & GYNECOLOGY

## 2021-10-01 PROCEDURE — 3074F PR MOST RECENT SYSTOLIC BLOOD PRESSURE < 130 MM HG: ICD-10-PCS | Mod: S$GLB,,, | Performed by: OBSTETRICS & GYNECOLOGY

## 2021-10-01 PROCEDURE — 99213 PR OFFICE/OUTPT VISIT, EST, LEVL III, 20-29 MIN: ICD-10-PCS | Mod: S$GLB,,, | Performed by: OBSTETRICS & GYNECOLOGY

## 2021-10-01 PROCEDURE — 3008F BODY MASS INDEX DOCD: CPT | Mod: S$GLB,,, | Performed by: OBSTETRICS & GYNECOLOGY

## 2021-10-01 PROCEDURE — 3074F SYST BP LT 130 MM HG: CPT | Mod: S$GLB,,, | Performed by: OBSTETRICS & GYNECOLOGY

## 2021-10-01 PROCEDURE — 1159F MED LIST DOCD IN RCRD: CPT | Mod: S$GLB,,, | Performed by: OBSTETRICS & GYNECOLOGY

## 2021-10-01 PROCEDURE — 99213 OFFICE O/P EST LOW 20 MIN: CPT | Mod: S$GLB,,, | Performed by: OBSTETRICS & GYNECOLOGY

## 2021-10-02 ENCOUNTER — PATIENT MESSAGE (OUTPATIENT)
Dept: OBSTETRICS AND GYNECOLOGY | Facility: CLINIC | Age: 26
End: 2021-10-02

## 2021-10-26 NOTE — TELEPHONE ENCOUNTER
In order to write a release - she would need to be seen for illness that we are excusing.  She can Fu with [facility] also if she needs excuse   I am not able to write one at this time    no

## 2021-11-01 ENCOUNTER — OFFICE VISIT (OUTPATIENT)
Dept: FAMILY MEDICINE | Facility: CLINIC | Age: 26
End: 2021-11-01
Payer: COMMERCIAL

## 2021-11-01 VITALS
TEMPERATURE: 98 F | RESPIRATION RATE: 18 BRPM | HEIGHT: 63 IN | WEIGHT: 205 LBS | DIASTOLIC BLOOD PRESSURE: 72 MMHG | BODY MASS INDEX: 36.32 KG/M2 | HEART RATE: 100 BPM | SYSTOLIC BLOOD PRESSURE: 118 MMHG | OXYGEN SATURATION: 98 %

## 2021-11-01 DIAGNOSIS — R11.0 NAUSEA: Primary | ICD-10-CM

## 2021-11-01 DIAGNOSIS — K52.9 GASTROENTERITIS: ICD-10-CM

## 2021-11-01 DIAGNOSIS — R10.9 ABDOMINAL PAIN, UNSPECIFIED ABDOMINAL LOCATION: ICD-10-CM

## 2021-11-01 PROCEDURE — 99213 OFFICE O/P EST LOW 20 MIN: CPT | Mod: S$GLB,,, | Performed by: NURSE PRACTITIONER

## 2021-11-01 PROCEDURE — 1159F MED LIST DOCD IN RCRD: CPT | Mod: S$GLB,,, | Performed by: NURSE PRACTITIONER

## 2021-11-01 PROCEDURE — 1160F RVW MEDS BY RX/DR IN RCRD: CPT | Mod: S$GLB,,, | Performed by: NURSE PRACTITIONER

## 2021-11-01 PROCEDURE — 1160F PR REVIEW ALL MEDS BY PRESCRIBER/CLIN PHARMACIST DOCUMENTED: ICD-10-PCS | Mod: S$GLB,,, | Performed by: NURSE PRACTITIONER

## 2021-11-01 PROCEDURE — 3078F DIAST BP <80 MM HG: CPT | Mod: S$GLB,,, | Performed by: NURSE PRACTITIONER

## 2021-11-01 PROCEDURE — 3008F BODY MASS INDEX DOCD: CPT | Mod: S$GLB,,, | Performed by: NURSE PRACTITIONER

## 2021-11-01 PROCEDURE — 99213 PR OFFICE/OUTPT VISIT, EST, LEVL III, 20-29 MIN: ICD-10-PCS | Mod: S$GLB,,, | Performed by: NURSE PRACTITIONER

## 2021-11-01 PROCEDURE — 3074F SYST BP LT 130 MM HG: CPT | Mod: S$GLB,,, | Performed by: NURSE PRACTITIONER

## 2021-11-01 PROCEDURE — 3078F PR MOST RECENT DIASTOLIC BLOOD PRESSURE < 80 MM HG: ICD-10-PCS | Mod: S$GLB,,, | Performed by: NURSE PRACTITIONER

## 2021-11-01 PROCEDURE — 1159F PR MEDICATION LIST DOCUMENTED IN MEDICAL RECORD: ICD-10-PCS | Mod: S$GLB,,, | Performed by: NURSE PRACTITIONER

## 2021-11-01 PROCEDURE — 3074F PR MOST RECENT SYSTOLIC BLOOD PRESSURE < 130 MM HG: ICD-10-PCS | Mod: S$GLB,,, | Performed by: NURSE PRACTITIONER

## 2021-11-01 PROCEDURE — 3008F PR BODY MASS INDEX (BMI) DOCUMENTED: ICD-10-PCS | Mod: S$GLB,,, | Performed by: NURSE PRACTITIONER

## 2021-11-01 RX ORDER — PRENATAL VIT 75/IRON/FOLIC/OM3 28-800-223
COMBINATION PACKAGE (EA) ORAL
COMMUNITY
Start: 2021-09-27 | End: 2022-07-26

## 2021-11-01 RX ORDER — SUCRALFATE 1 G/1
1 TABLET ORAL 4 TIMES DAILY
Qty: 120 TABLET | Refills: 1 | Status: SHIPPED | OUTPATIENT
Start: 2021-11-01 | End: 2022-02-21

## 2021-11-01 RX ORDER — ONDANSETRON 8 MG/1
8 TABLET, ORALLY DISINTEGRATING ORAL EVERY 12 HOURS PRN
Qty: 20 TABLET | Refills: 6 | Status: SHIPPED | OUTPATIENT
Start: 2021-11-01 | End: 2023-01-05 | Stop reason: SDUPTHER

## 2021-11-01 RX ORDER — DICYCLOMINE HYDROCHLORIDE 10 MG/1
10 CAPSULE ORAL
Qty: 30 CAPSULE | Refills: 2 | Status: SHIPPED | OUTPATIENT
Start: 2021-11-01 | End: 2021-12-01

## 2021-11-03 ENCOUNTER — PATIENT MESSAGE (OUTPATIENT)
Dept: FAMILY MEDICINE | Facility: CLINIC | Age: 26
End: 2021-11-03
Payer: COMMERCIAL

## 2021-11-22 ENCOUNTER — PATIENT MESSAGE (OUTPATIENT)
Dept: OBSTETRICS AND GYNECOLOGY | Facility: CLINIC | Age: 26
End: 2021-11-22
Payer: COMMERCIAL

## 2021-12-01 ENCOUNTER — TELEPHONE (OUTPATIENT)
Dept: FAMILY MEDICINE | Facility: CLINIC | Age: 26
End: 2021-12-01
Payer: COMMERCIAL

## 2021-12-01 RX ORDER — PREDNISONE 10 MG/1
TABLET ORAL
Qty: 7 TABLET | Refills: 0 | Status: SHIPPED | OUTPATIENT
Start: 2021-12-01 | End: 2021-12-16

## 2021-12-15 ENCOUNTER — PATIENT MESSAGE (OUTPATIENT)
Dept: FAMILY MEDICINE | Facility: CLINIC | Age: 26
End: 2021-12-15
Payer: COMMERCIAL

## 2021-12-16 ENCOUNTER — OFFICE VISIT (OUTPATIENT)
Dept: FAMILY MEDICINE | Facility: CLINIC | Age: 26
End: 2021-12-16
Payer: COMMERCIAL

## 2021-12-16 VITALS
SYSTOLIC BLOOD PRESSURE: 122 MMHG | HEIGHT: 63 IN | WEIGHT: 213.31 LBS | RESPIRATION RATE: 18 BRPM | DIASTOLIC BLOOD PRESSURE: 68 MMHG | OXYGEN SATURATION: 98 % | HEART RATE: 83 BPM | TEMPERATURE: 98 F | BODY MASS INDEX: 37.8 KG/M2

## 2021-12-16 DIAGNOSIS — K52.9 GASTROENTERITIS: ICD-10-CM

## 2021-12-16 PROCEDURE — 99213 PR OFFICE/OUTPT VISIT, EST, LEVL III, 20-29 MIN: ICD-10-PCS | Mod: S$GLB,,, | Performed by: FAMILY MEDICINE

## 2021-12-16 PROCEDURE — 99213 OFFICE O/P EST LOW 20 MIN: CPT | Mod: S$GLB,,, | Performed by: FAMILY MEDICINE

## 2021-12-16 RX ORDER — PROMETHAZINE HYDROCHLORIDE 25 MG/1
25 TABLET ORAL EVERY 4 HOURS
Qty: 30 TABLET | Refills: 1 | Status: SHIPPED | OUTPATIENT
Start: 2021-12-16 | End: 2022-02-21

## 2022-01-07 ENCOUNTER — PATIENT MESSAGE (OUTPATIENT)
Dept: FAMILY MEDICINE | Facility: CLINIC | Age: 27
End: 2022-01-07
Payer: COMMERCIAL

## 2022-01-24 ENCOUNTER — PATIENT MESSAGE (OUTPATIENT)
Dept: OBSTETRICS AND GYNECOLOGY | Facility: CLINIC | Age: 27
End: 2022-01-24
Payer: COMMERCIAL

## 2022-01-25 RX ORDER — NORGESTIMATE AND ETHINYL ESTRADIOL 7DAYSX3 LO
1 KIT ORAL DAILY
Qty: 84 TABLET | Refills: 0 | Status: SHIPPED | OUTPATIENT
Start: 2022-01-25 | End: 2022-06-17 | Stop reason: SDUPTHER

## 2022-01-25 NOTE — TELEPHONE ENCOUNTER
Pt requesting refill of Birth Control. Pt also wanted clarification of timeline for taking PNV and BC based on her last appointment with Dr. Simmons. This message will also be printed out for him to review and respond accordingly. Pt notified of this messaging process and VU.     Medication: TRI-LO-SPRINTEC 0.18/0.215/0.25 mg-25 mcg tablet  Allergies: [facility]  Pharmacy: [facility]  [id number]    [place], [place]

## 2022-01-25 NOTE — TELEPHONE ENCOUNTER
Maureen also wanted to know what the timeline for taking PNV and BC would be based off her last visit with Dr. Simmons. Please advise.

## 2022-02-18 ENCOUNTER — TELEPHONE (OUTPATIENT)
Dept: OBSTETRICS AND GYNECOLOGY | Facility: CLINIC | Age: 27
End: 2022-02-18
Payer: COMMERCIAL

## 2022-02-21 ENCOUNTER — OFFICE VISIT (OUTPATIENT)
Dept: FAMILY MEDICINE | Facility: CLINIC | Age: 27
End: 2022-02-21
Payer: COMMERCIAL

## 2022-02-21 VITALS
HEART RATE: 80 BPM | WEIGHT: 216.69 LBS | TEMPERATURE: 98 F | BODY MASS INDEX: 38.39 KG/M2 | DIASTOLIC BLOOD PRESSURE: 66 MMHG | SYSTOLIC BLOOD PRESSURE: 108 MMHG | HEIGHT: 63 IN | OXYGEN SATURATION: 98 %

## 2022-02-21 DIAGNOSIS — F41.9 ANXIETY: Primary | ICD-10-CM

## 2022-02-21 PROCEDURE — 99214 OFFICE O/P EST MOD 30 MIN: CPT | Mod: S$GLB,,, | Performed by: FAMILY MEDICINE

## 2022-02-21 PROCEDURE — 99214 PR OFFICE/OUTPT VISIT, EST, LEVL IV, 30-39 MIN: ICD-10-PCS | Mod: S$GLB,,, | Performed by: FAMILY MEDICINE

## 2022-02-21 PROCEDURE — 3078F DIAST BP <80 MM HG: CPT | Mod: S$GLB,,, | Performed by: FAMILY MEDICINE

## 2022-02-21 PROCEDURE — 3074F PR MOST RECENT SYSTOLIC BLOOD PRESSURE < 130 MM HG: ICD-10-PCS | Mod: S$GLB,,, | Performed by: FAMILY MEDICINE

## 2022-02-21 PROCEDURE — 3008F PR BODY MASS INDEX (BMI) DOCUMENTED: ICD-10-PCS | Mod: S$GLB,,, | Performed by: FAMILY MEDICINE

## 2022-02-21 PROCEDURE — 3008F BODY MASS INDEX DOCD: CPT | Mod: S$GLB,,, | Performed by: FAMILY MEDICINE

## 2022-02-21 PROCEDURE — 3074F SYST BP LT 130 MM HG: CPT | Mod: S$GLB,,, | Performed by: FAMILY MEDICINE

## 2022-02-21 PROCEDURE — 3078F PR MOST RECENT DIASTOLIC BLOOD PRESSURE < 80 MM HG: ICD-10-PCS | Mod: S$GLB,,, | Performed by: FAMILY MEDICINE

## 2022-02-21 RX ORDER — BUPROPION HYDROCHLORIDE 150 MG/1
150 TABLET ORAL DAILY
Qty: 30 TABLET | Refills: 11 | Status: SHIPPED | OUTPATIENT
Start: 2022-02-21 | End: 2022-03-22 | Stop reason: SDUPTHER

## 2022-02-21 NOTE — PROGRESS NOTES
"  Chief Complaint   Patient presents with    Medication Problem     Patient presents for medication change, feels current medication is not working.    Anxiety        HPI:  Healthy 26 female here for follow up of anxiety.  Has had some version of anxiety disorder since childhood.  Was on celexa in high school, which wasn't effective.  On prozac now, but finds this isn't effective as well.    Tends to experience anger as outlet for her stress - is quick tempered and tends to lash out when frustrated.    Has some component of depression as well with recent loss of uncle.  Motivation is poor, and physical activity is poor.    A large contributing factor of anxiety is issues with fiance regarding his own lack of self-care for mental health issues    ROS: +stress and anxiety    Vitals:    02/21/22 1110   BP: 108/66   BP Location: Left arm   Patient Position: Sitting   BP Method: Large (Manual)   Pulse: 80   Temp: 98 °F (36.7 °C)   TempSrc: Temporal   SpO2: 98%   Weight: 98.3 kg (216 lb 11.2 oz)   Height: 5' 3" (1.6 m)      Body mass index is 38.39 kg/m².      General:  AOx3, well nourished and developed in no acute distress  Eyes:  PERRLA, EOMI, vision intact grossly  ENT:  normal hearing, moist oral mucosa  Neck:  trachea midline with no masses or thyromegaly  Heart:  RRR, no murmurs.  No edema noted, extremities warm and well perfused  Lungs:  clear to auscultation bilaterally with symmetric chest movement  Abdomen:  Soft, nontender, nondistended.  Normal bowel sounds  Musculoskeletal:  Normal gait.  Normal posture.  Normal muscular development with no joint swelling.  Neurological:  CN II-XII grossly intact. Symmetric strength and sensation  Psych:  Normal mood and affect.  Able to demonstrate good judgement and personal insight.      Assessment/Plan:    Anxiety       Change to wellbutrin today to improve mood and better improve weight loss.  Made strong recs to start daily exercise to also help improve mood.  " Follow up 1 month

## 2022-02-21 NOTE — PATIENT INSTRUCTIONS
Make changes today regarding medication - start wellbutrin and stop prozac  Now is not best time to have a child - I would focus right now on marriage and mental health  EXERCISE is key - you need to have healthy physical outlet for your anxiety daily (yoga, hernesto chi, cardio, weight lifting, etc)    Follow up 1 month

## 2022-03-22 ENCOUNTER — OFFICE VISIT (OUTPATIENT)
Dept: FAMILY MEDICINE | Facility: CLINIC | Age: 27
End: 2022-03-22
Payer: COMMERCIAL

## 2022-03-22 VITALS
RESPIRATION RATE: 18 BRPM | OXYGEN SATURATION: 97 % | TEMPERATURE: 98 F | HEIGHT: 63 IN | HEART RATE: 92 BPM | SYSTOLIC BLOOD PRESSURE: 124 MMHG | DIASTOLIC BLOOD PRESSURE: 80 MMHG | WEIGHT: 276 LBS | BODY MASS INDEX: 48.9 KG/M2

## 2022-03-22 DIAGNOSIS — F41.9 ANXIETY: Primary | ICD-10-CM

## 2022-03-22 PROCEDURE — 99214 OFFICE O/P EST MOD 30 MIN: CPT | Mod: S$GLB,,, | Performed by: FAMILY MEDICINE

## 2022-03-22 PROCEDURE — 3074F SYST BP LT 130 MM HG: CPT | Mod: S$GLB,,, | Performed by: FAMILY MEDICINE

## 2022-03-22 PROCEDURE — 3074F PR MOST RECENT SYSTOLIC BLOOD PRESSURE < 130 MM HG: ICD-10-PCS | Mod: S$GLB,,, | Performed by: FAMILY MEDICINE

## 2022-03-22 PROCEDURE — 3079F PR MOST RECENT DIASTOLIC BLOOD PRESSURE 80-89 MM HG: ICD-10-PCS | Mod: S$GLB,,, | Performed by: FAMILY MEDICINE

## 2022-03-22 PROCEDURE — 3008F BODY MASS INDEX DOCD: CPT | Mod: S$GLB,,, | Performed by: FAMILY MEDICINE

## 2022-03-22 PROCEDURE — 99214 PR OFFICE/OUTPT VISIT, EST, LEVL IV, 30-39 MIN: ICD-10-PCS | Mod: S$GLB,,, | Performed by: FAMILY MEDICINE

## 2022-03-22 PROCEDURE — 3079F DIAST BP 80-89 MM HG: CPT | Mod: S$GLB,,, | Performed by: FAMILY MEDICINE

## 2022-03-22 PROCEDURE — 3008F PR BODY MASS INDEX (BMI) DOCUMENTED: ICD-10-PCS | Mod: S$GLB,,, | Performed by: FAMILY MEDICINE

## 2022-03-22 RX ORDER — TRIAMCINOLONE ACETONIDE 1 MG/G
OINTMENT TOPICAL 3 TIMES DAILY
COMMUNITY
Start: 2022-01-08 | End: 2022-07-26

## 2022-03-22 RX ORDER — MUPIROCIN 20 MG/G
OINTMENT TOPICAL 3 TIMES DAILY
COMMUNITY
Start: 2022-01-07 | End: 2022-07-26

## 2022-03-22 RX ORDER — BUPROPION HYDROCHLORIDE 150 MG/1
150 TABLET ORAL DAILY
Qty: 5 TABLET | Refills: 0 | Status: SHIPPED | OUTPATIENT
Start: 2022-03-22 | End: 2022-03-30

## 2022-03-22 RX ORDER — DULOXETIN HYDROCHLORIDE 20 MG/1
20 CAPSULE, DELAYED RELEASE ORAL DAILY
Qty: 30 CAPSULE | Refills: 11 | Status: SHIPPED | OUTPATIENT
Start: 2022-03-22 | End: 2023-03-20

## 2022-03-22 NOTE — PROGRESS NOTES
"  Family Medicine Note  [facility] [address]    Chief Complaint   Patient presents with    Anxiety        HPI:  Med follow up  - wellbutrin causes increased energy, but outbursts/anger is still notable, and has some associated headache as well.  Didn't tolerate celexa and prozac previously - weren't effective    ROS: +scant ankle pain, poor mood    Vitals:    03/22/22 0910   BP: 124/80   BP Location: Right arm   Patient Position: Sitting   BP Method: Medium (Manual)   Pulse: 92   Resp: 18   Temp: 97.5 °F (36.4 °C)   SpO2: 97%   Weight: 125.2 kg (276 lb)   Height: 5' 3" (1.6 m)      Body mass index is 48.89 kg/m².      General:  AOx3, well nourished and developed in no acute distress  Eyes:  PERRLA, EOMI, vision intact grossly  ENT:  normal hearing, moist oral mucosa  Neck:  trachea midline with no masses or thyromegaly  Heart:  RRR, no murmurs.  No edema noted, extremities warm and well perfused  Lungs:  clear to auscultation bilaterally with symmetric chest movement  Abdomen:  Soft, nontender, nondistended.  Normal bowel sounds  Musculoskeletal:  Normal gait.  Normal posture.  Normal muscular development with no joint swelling.  Neurological:  CN II-XII grossly intact. Symmetric strength and sensation  Psych:  Normal mood and affect.  Able to demonstrate good judgement and personal insight.      Assessment/Plan:    Anxiety       Cross taper today from wellbutrin onto cymbalta.  Follow up 1-2 months    Needs referral for OBGYN (routine exam)      "

## 2022-03-22 NOTE — PATIENT INSTRUCTIONS
Use ice and voltaren gel aggressively for ankle issues    Start cymbalta today, take wellbutrin every other day for only 5 pills    Follow up 1 month to reassess

## 2022-04-25 ENCOUNTER — OFFICE VISIT (OUTPATIENT)
Dept: FAMILY MEDICINE | Facility: CLINIC | Age: 27
End: 2022-04-25
Payer: COMMERCIAL

## 2022-04-25 VITALS
HEART RATE: 101 BPM | HEIGHT: 63 IN | DIASTOLIC BLOOD PRESSURE: 88 MMHG | TEMPERATURE: 98 F | BODY MASS INDEX: 38.01 KG/M2 | OXYGEN SATURATION: 98 % | WEIGHT: 214.5 LBS | SYSTOLIC BLOOD PRESSURE: 130 MMHG

## 2022-04-25 DIAGNOSIS — R06.2 WHEEZING: ICD-10-CM

## 2022-04-25 DIAGNOSIS — F41.9 ANXIETY: Primary | ICD-10-CM

## 2022-04-25 DIAGNOSIS — F43.22 ADJUSTMENT DISORDER WITH ANXIOUS MOOD: ICD-10-CM

## 2022-04-25 PROCEDURE — 3075F SYST BP GE 130 - 139MM HG: CPT | Mod: S$GLB,,, | Performed by: FAMILY MEDICINE

## 2022-04-25 PROCEDURE — 1159F MED LIST DOCD IN RCRD: CPT | Mod: S$GLB,,, | Performed by: FAMILY MEDICINE

## 2022-04-25 PROCEDURE — 3079F PR MOST RECENT DIASTOLIC BLOOD PRESSURE 80-89 MM HG: ICD-10-PCS | Mod: S$GLB,,, | Performed by: FAMILY MEDICINE

## 2022-04-25 PROCEDURE — 99214 PR OFFICE/OUTPT VISIT, EST, LEVL IV, 30-39 MIN: ICD-10-PCS | Mod: S$GLB,,, | Performed by: FAMILY MEDICINE

## 2022-04-25 PROCEDURE — 3008F BODY MASS INDEX DOCD: CPT | Mod: S$GLB,,, | Performed by: FAMILY MEDICINE

## 2022-04-25 PROCEDURE — 3075F PR MOST RECENT SYSTOLIC BLOOD PRESS GE 130-139MM HG: ICD-10-PCS | Mod: S$GLB,,, | Performed by: FAMILY MEDICINE

## 2022-04-25 PROCEDURE — 1159F PR MEDICATION LIST DOCUMENTED IN MEDICAL RECORD: ICD-10-PCS | Mod: S$GLB,,, | Performed by: FAMILY MEDICINE

## 2022-04-25 PROCEDURE — 3008F PR BODY MASS INDEX (BMI) DOCUMENTED: ICD-10-PCS | Mod: S$GLB,,, | Performed by: FAMILY MEDICINE

## 2022-04-25 PROCEDURE — 3079F DIAST BP 80-89 MM HG: CPT | Mod: S$GLB,,, | Performed by: FAMILY MEDICINE

## 2022-04-25 PROCEDURE — 99214 OFFICE O/P EST MOD 30 MIN: CPT | Mod: S$GLB,,, | Performed by: FAMILY MEDICINE

## 2022-04-25 RX ORDER — HYDROXYZINE HYDROCHLORIDE 25 MG/1
25 TABLET, FILM COATED ORAL NIGHTLY PRN
Qty: 30 TABLET | Refills: 6 | Status: SHIPPED | OUTPATIENT
Start: 2022-04-25

## 2022-04-25 RX ORDER — HYDROXYZINE PAMOATE 25 MG/1
25 CAPSULE ORAL NIGHTLY PRN
Qty: 30 CAPSULE | Refills: 6 | Status: SHIPPED | OUTPATIENT
Start: 2022-04-25 | End: 2022-07-26

## 2022-04-25 RX ORDER — HYDROXYZINE PAMOATE 25 MG/1
25 CAPSULE ORAL NIGHTLY PRN
Qty: 30 CAPSULE | Refills: 6 | Status: SHIPPED | OUTPATIENT
Start: 2022-04-25 | End: 2022-04-25

## 2022-04-25 RX ORDER — ALBUTEROL SULFATE 90 UG/1
2 AEROSOL, METERED RESPIRATORY (INHALATION) EVERY 4 HOURS PRN
Qty: 18 G | Refills: 3 | Status: SHIPPED | OUTPATIENT
Start: 2022-04-25 | End: 2023-01-05 | Stop reason: SDUPTHER

## 2022-04-25 NOTE — PATIENT INSTRUCTIONS
Use inhaler as needed  Start vistaril at night to help with sleep    Ok to follow up as needed  - can definitely use virtual visit if needed.

## 2022-04-25 NOTE — PROGRESS NOTES
"  Family Medicine Note  [facility]    Chief Complaint   Patient presents with    Follow-up     1 month   Med management        HPI:  Made change at last visit from wellbutrin to cymbalta to help increase/modulate serotonin to better control mood/anger fluctuations.    Today, is reporting that cymbalta is actually helping anxiety quite a bit - mood is better, fluctuations are much better.    However, since last visit she has split from her fiance given his severe untreated mental health issues.  Sleep is poor in this setting.  This increased stress is contributing to weight loss.  Vistaril has worked quite well in past    ROS: poor sleep, but good adjustment    Vitals:    04/25/22 1008   BP: 130/88   BP Location: Right arm   Patient Position: Sitting   Pulse: 101   Temp: 98.2 °F (36.8 °C)   SpO2: 98%   Weight: 97.3 kg (214 lb 8 oz)   Height: 5' 3" (1.6 m)      Body mass index is 38 kg/m².      General:  AOx3, well nourished and developed in no acute distress  Eyes:  PERRLA, EOMI, vision intact grossly  ENT:  normal hearing, moist oral mucosa  Neck:  trachea midline with no masses or thyromegaly  Heart:  RRR, no murmurs.  No edema noted, extremities warm and well perfused  Lungs:  clear to auscultation bilaterally with symmetric chest movement  Abdomen:  Soft, nontender, nondistended.  Normal bowel sounds  Musculoskeletal:  Normal gait.  Normal posture.  Normal muscular development with no joint swelling.  Neurological:  CN II-XII grossly intact. Symmetric strength and sensation  Psych:  Normal mood and affect.  Able to demonstrate good judgement and personal insight.      Assessment/Plan:    Anxiety    Adjustment disorder with anxious mood     Patient actually dealing with this adjustment quite well, but would benefit from vistaril to help with sleep as well.  Follow up as needed for further medication management.        "

## 2022-06-09 ENCOUNTER — PATIENT MESSAGE (OUTPATIENT)
Dept: FAMILY MEDICINE | Facility: CLINIC | Age: 27
End: 2022-06-09
Payer: COMMERCIAL

## 2022-06-17 ENCOUNTER — OFFICE VISIT (OUTPATIENT)
Dept: OBSTETRICS AND GYNECOLOGY | Facility: CLINIC | Age: 27
End: 2022-06-17
Payer: COMMERCIAL

## 2022-06-17 VITALS
HEIGHT: 63 IN | SYSTOLIC BLOOD PRESSURE: 118 MMHG | BODY MASS INDEX: 38.67 KG/M2 | WEIGHT: 218.25 LBS | DIASTOLIC BLOOD PRESSURE: 82 MMHG

## 2022-06-17 DIAGNOSIS — Z12.4 CERVICAL CANCER SCREENING: ICD-10-CM

## 2022-06-17 DIAGNOSIS — R59.0 AXILLARY ADENOPATHY: Primary | ICD-10-CM

## 2022-06-17 PROCEDURE — 1159F MED LIST DOCD IN RCRD: CPT | Mod: CPTII,S$GLB,, | Performed by: OBSTETRICS & GYNECOLOGY

## 2022-06-17 PROCEDURE — 3074F SYST BP LT 130 MM HG: CPT | Mod: CPTII,S$GLB,, | Performed by: OBSTETRICS & GYNECOLOGY

## 2022-06-17 PROCEDURE — 3008F PR BODY MASS INDEX (BMI) DOCUMENTED: ICD-10-PCS | Mod: CPTII,S$GLB,, | Performed by: OBSTETRICS & GYNECOLOGY

## 2022-06-17 PROCEDURE — 99999 PR PBB SHADOW E&M-EST. PATIENT-LVL IV: CPT | Mod: PBBFAC,,, | Performed by: OBSTETRICS & GYNECOLOGY

## 2022-06-17 PROCEDURE — 3008F BODY MASS INDEX DOCD: CPT | Mod: CPTII,S$GLB,, | Performed by: OBSTETRICS & GYNECOLOGY

## 2022-06-17 PROCEDURE — 3079F DIAST BP 80-89 MM HG: CPT | Mod: CPTII,S$GLB,, | Performed by: OBSTETRICS & GYNECOLOGY

## 2022-06-17 PROCEDURE — 99395 PR PREVENTIVE VISIT,EST,18-39: ICD-10-PCS | Mod: S$GLB,,, | Performed by: OBSTETRICS & GYNECOLOGY

## 2022-06-17 PROCEDURE — 3074F PR MOST RECENT SYSTOLIC BLOOD PRESSURE < 130 MM HG: ICD-10-PCS | Mod: CPTII,S$GLB,, | Performed by: OBSTETRICS & GYNECOLOGY

## 2022-06-17 PROCEDURE — 99999 PR PBB SHADOW E&M-EST. PATIENT-LVL IV: ICD-10-PCS | Mod: PBBFAC,,, | Performed by: OBSTETRICS & GYNECOLOGY

## 2022-06-17 PROCEDURE — 88175 CYTOPATH C/V AUTO FLUID REDO: CPT | Performed by: OBSTETRICS & GYNECOLOGY

## 2022-06-17 PROCEDURE — 87624 HPV HI-RISK TYP POOLED RSLT: CPT | Performed by: OBSTETRICS & GYNECOLOGY

## 2022-06-17 PROCEDURE — 3079F PR MOST RECENT DIASTOLIC BLOOD PRESSURE 80-89 MM HG: ICD-10-PCS | Mod: CPTII,S$GLB,, | Performed by: OBSTETRICS & GYNECOLOGY

## 2022-06-17 PROCEDURE — 99395 PREV VISIT EST AGE 18-39: CPT | Mod: S$GLB,,, | Performed by: OBSTETRICS & GYNECOLOGY

## 2022-06-17 PROCEDURE — 1159F PR MEDICATION LIST DOCUMENTED IN MEDICAL RECORD: ICD-10-PCS | Mod: CPTII,S$GLB,, | Performed by: OBSTETRICS & GYNECOLOGY

## 2022-06-17 RX ORDER — NORGESTIMATE AND ETHINYL ESTRADIOL 7DAYSX3 LO
1 KIT ORAL DAILY
Qty: 84 TABLET | Refills: 3 | Status: SHIPPED | OUTPATIENT
Start: 2022-06-17 | End: 2023-05-18

## 2022-06-17 NOTE — PROGRESS NOTES
Chief Complaint   Patient presents with    Well Woman    Contraception     Refill         History and Physical:  Patient's last menstrual period was 2022 (exact date).       Maureen Barrera is a 26 y.o.   female who presents today for her routine annual GYN exam. The patient has no Gynecology complaints today. Lymph nodes tender under each breast- counseled.       Allergies:   Review of patient's allergies indicates:   Allergen Reactions    Reglan [metoclopramide]        Past Medical History:   Diagnosis Date    Anxiety     Eczema     Fracture of left foot     GERD (gastroesophageal reflux disease)     Headache(784.0)     Lactose intolerance     Polyp of nasal cavity     PONV (postoperative nausea and vomiting)     Recurrent upper respiratory infection (URI)     Sprain of right ankle 10/26/11    Varicella     2nd grade       Past Surgical History:   Procedure Laterality Date    ARTHROSCOPY OF ANKLE WITH DEBRIDEMENT Left 2018    Procedure: ARTHROSCOPY, ANKLE, WITH DEBRIDEMENT;  Surgeon: Jimmie Red MD;  Location: [facility] OR;  Service: Orthopedics;  Laterality: Left;    LAPAROSCOPIC CHOLECYSTECTOMY N/A 7/10/2019    Procedure: CHOLECYSTECTOMY, LAPAROSCOPIC;  Surgeon: Carlos Alberto Baker MD;  Location: [facility] OR;  Service: General;  Laterality: N/A;    PLANTAR'S WART EXCISION  2015    POLYPECTOMY      Nasal    SPHENOIDECTOMY         MEDS:   Current Outpatient Medications on File Prior to Visit   Medication Sig Dispense Refill    albuterol (VENTOLIN HFA) 90 mcg/actuation inhaler Inhale 2 puffs into the lungs every 4 (four) hours as needed for Wheezing. Rescue 18 g 3    cholecalciferol, vitamin D3, 25 mcg (1,000 unit) Chew Take by mouth.      DULoxetine (CYMBALTA) 20 MG capsule Take 1 capsule (20 mg total) by mouth once daily. 30 capsule 11    hydrOXYzine HCL (ATARAX) 25 MG tablet Take 1 tablet (25 mg total) by mouth nightly as needed for Itching. 30 tablet  6    melatonin (MELATIN) 3 mg tablet       multivitamin (THERAGRAN) per tablet Take 1 tablet by mouth once daily.      ondansetron (ZOFRAN-ODT) 8 MG TbDL Take 1 tablet (8 mg total) by mouth every 12 (twelve) hours as needed (nausea). 20 tablet 6    prenat75-iron fum-folic ac-om3 (ONE A DAY WOMEN'S PRENATAL DHA) 28 mg iron- 800 mcg Cmpk       [DISCONTINUED] norgestimate-ethinyl estradioL (TRI-LO-SPRINTEC) 0.18/0.215/0.25 mg-25 mcg tablet Take 1 tablet by mouth once daily. 84 tablet 0    hydrOXYzine pamoate (VISTARIL) 25 MG Cap Take 1 capsule (25 mg total) by mouth nightly as needed (sleep). 30 capsule 6    mupirocin (BACTROBAN) 2 % ointment Apply topically 3 (three) times daily.      triamcinolone acetonide 0.1% (KENALOG) 0.1 % ointment Apply topically 3 (three) times daily.       No current facility-administered medications on file prior to visit.       OB History        0    Para   0    Term   0       0    AB   0    Living   0       SAB   0    IAB   0    Ectopic   0    Multiple   0    Live Births   0                 Social History     Socioeconomic History    Marital status:    Occupational History    Occupation: vet tech   Tobacco Use    Smoking status: Former Smoker     Types: Vaping w/o nicotine    Smokeless tobacco: Never Used    Tobacco comment: Vapes CBD oil. Not as frequent. SL PRN per pt   Substance and Sexual Activity    Alcohol use: Not Currently     Comment: once a month    Drug use: No    Sexual activity: Yes     Partners: Male   Social History Narrative    Born in [place].  Moved from [place] in .       Family History   Problem Relation Age of Onset    Allergies Father     Eczema Father     Immunodeficiency Father     Breast cancer Mother     Allergies Paternal Aunt     Eczema Paternal Aunt     Allergies Paternal Uncle     Eczema Paternal Uncle     Immunodeficiency Maternal Grandmother     Breast cancer Maternal Grandmother     Ovarian cancer Maternal  "Grandmother     Allergies Paternal Grandmother     Angioedema Paternal Grandmother     Eczema Paternal Grandmother     Allergies Paternal Grandfather     Eczema Paternal Grandfather     Allergic rhinitis Neg Hx     Asthma Neg Hx     Atopy Neg Hx     Rhinitis Neg Hx     Urticaria Neg Hx          Past medical and surgical history reviewed.   I have reviewed the patient's medical history in detail and updated the computerized patient record.        Review of System:   General: no chills, fever, night sweats, weight gain or weight loss  Psychological: no depression or suicidal ideation  Breasts: no new or changing breast lumps, nipple discharge or masses.  Respiratory: no cough, shortness of breath, or wheezing  Cardiovascular: no chest pain or dyspnea on exertion  Gastrointestinal: no abdominal pain, change in bowel habits, or black or bloody stools  Genito-Urinary: no incontinence, urinary frequency/urgency or vulvar/vaginal symptoms, pelvic pain or abnormal vaginal bleeding.  Musculoskeletal: no gait disturbance or muscular weakness        Physical Exam:   /82   Ht 5' 3" (1.6 m)   Wt 99 kg (218 lb 4.1 oz)   LMP 06/07/2022 (Exact Date)   BMI 38.66 kg/m²   Constitutional: She appears alert and responsive. She appears well-developed, well-groomed, and well-nourished. No distress. OverWeight   HENT:   Head: Normocephalic and atraumatic.   Eyes: Conjunctivae and EOM are normal. No scleral icterus.   Neck: Symmetrical. Normal range of motion. Neck supple. No tracheal deviation present.   Cardiovascular: Normal rate, no rhythm abnormality noted. Extremities without swelling or edema, warm.    Pulmonary/Chest: Normal respiratory Effort. No distress or retractions. She exhibits no tenderness.  Breasts: are symmetrical. Shotty adenopathy right > left axilla   Right breast exhibits no inverted nipple, no mass, no nipple discharge, no skin change and no tenderness.   Left breast exhibits no inverted nipple, " no mass, no nipple discharge, no skin change and no tenderness.  Abdominal: Soft. She exhibits no distension, hernias or masses. There is no tenderness. No enlargement of liver edge or spleen.  There is no rebound and no guarding.   Genitourinary:    External rectal exam shows no thrombosed external hemorrhoids, no lesions.     Pelvic exam was performed with patient supine.   No labial fusion, and symmetrical.    There is no rash, lesion or injury on the right labia.   There is no rash, lesion or injury on the left labia.   No bleeding and no signs of injury around the vaginal introitus, urethral meatus is normal size and without prolapse or lesions, urethra well supported. The cervix is visualized with no discharge, lesions or friability.   No vaginal discharge found.    No significant Cystocele, Enterocele or rectocele, and cervix and uterus well supported.   Bimanual exam:   The urethra is normal to palpation and there are no palpable vaginal wall masses.   Uterus is not deviated, not enlarged, not fixed, normal shape and not tender.   Cervix exhibits no motion tenderness.    Right adnexum displays no mass or nodularity and no tenderness.   Left adnexum displays no mass or nodularity and no tenderness.  Musculoskeletal: Normal range of motion.   Neurological: She is alert and oriented to person, place, and time. Coordination normal.   Skin: Skin is warm and dry. She is not diaphoretic. No rashes, lesions or ulcers.   Psychiatric: She has a normal mood and affect, oriented to person, place, and time.      Assessment:   Normal annual GYN exam  1. Axillary adenopathy  US Breast Bilateral Limited    US Breast Bilateral Limited       Plan:   PAP  Breast u/s- lymph nodes  Follow up in 1 year.  Patient informed will be contacted with results within 2 weeks. Encouraged to please call back or email if she has not heard from us by then.

## 2022-06-24 LAB
FINAL PATHOLOGIC DIAGNOSIS: NORMAL
Lab: NORMAL

## 2022-06-27 LAB
HPV HR 12 DNA SPEC QL NAA+PROBE: NEGATIVE
HPV16 AG SPEC QL: NEGATIVE
HPV18 DNA SPEC QL NAA+PROBE: NEGATIVE

## 2022-07-05 ENCOUNTER — PATIENT MESSAGE (OUTPATIENT)
Dept: FAMILY MEDICINE | Facility: CLINIC | Age: 27
End: 2022-07-05
Payer: COMMERCIAL

## 2022-07-10 ENCOUNTER — PATIENT MESSAGE (OUTPATIENT)
Dept: FAMILY MEDICINE | Facility: CLINIC | Age: 27
End: 2022-07-10
Payer: COMMERCIAL

## 2022-07-11 ENCOUNTER — PATIENT MESSAGE (OUTPATIENT)
Dept: FAMILY MEDICINE | Facility: CLINIC | Age: 27
End: 2022-07-11
Payer: COMMERCIAL

## 2022-07-11 RX ORDER — SCOLOPAMINE TRANSDERMAL SYSTEM 1 MG/1
1 PATCH, EXTENDED RELEASE TRANSDERMAL
Qty: 4 PATCH | Refills: 1 | Status: SHIPPED | OUTPATIENT
Start: 2022-07-11

## 2022-07-25 ENCOUNTER — TELEPHONE (OUTPATIENT)
Dept: OPHTHALMOLOGY | Facility: CLINIC | Age: 27
End: 2022-07-25
Payer: COMMERCIAL

## 2022-07-25 ENCOUNTER — PATIENT MESSAGE (OUTPATIENT)
Dept: FAMILY MEDICINE | Facility: CLINIC | Age: 27
End: 2022-07-25
Payer: COMMERCIAL

## 2022-07-25 NOTE — TELEPHONE ENCOUNTER
Spoke with pt. And r/s appt. Pt. Agreed. CW    ----- Message from Nadja Cobian sent at 7/25/2022 12:16 PM CDT -----  .Type:  Patient Call Back    Who Called: PT       Does the patient know what this is regarding?: RESCHEDULING PER OFFICE     Would the patient rather a call back YES     Best Call Back Number: [phone]    Additional Information: THE FIRST AVAILABLE I SEE IS 8/31 SHE WANTS TO BE SEEN SOONER. SHE'S ALSO UPSET SHE HAS WAITED AND NOW THE APPOINTMENT NEEDS TO BE BEFORE 8/7/2022 THE VISION INSURANCE WILL RUN OUT   Thank You

## 2022-07-26 ENCOUNTER — OFFICE VISIT (OUTPATIENT)
Dept: FAMILY MEDICINE | Facility: CLINIC | Age: 27
End: 2022-07-26
Payer: COMMERCIAL

## 2022-07-26 DIAGNOSIS — U07.1 COVID: ICD-10-CM

## 2022-07-26 DIAGNOSIS — F41.9 ANXIETY: Primary | ICD-10-CM

## 2022-07-26 PROBLEM — K52.9 GASTROENTERITIS: Status: RESOLVED | Noted: 2021-12-16 | Resolved: 2022-07-26

## 2022-07-26 PROBLEM — J32.4 CHRONIC PANSINUSITIS: Status: RESOLVED | Noted: 2018-01-22 | Resolved: 2022-07-26

## 2022-07-26 PROBLEM — B35.4 TINEA CORPORIS: Status: RESOLVED | Noted: 2017-12-20 | Resolved: 2022-07-26

## 2022-07-26 PROBLEM — N92.0 MENORRHAGIA WITH REGULAR CYCLE: Status: RESOLVED | Noted: 2018-04-18 | Resolved: 2022-07-26

## 2022-07-26 PROCEDURE — 99213 OFFICE O/P EST LOW 20 MIN: CPT | Mod: 95,,, | Performed by: FAMILY MEDICINE

## 2022-07-26 PROCEDURE — 99213 PR OFFICE/OUTPT VISIT, EST, LEVL III, 20-29 MIN: ICD-10-PCS | Mod: 95,,, | Performed by: FAMILY MEDICINE

## 2022-07-26 NOTE — PROGRESS NOTES
Chief Complaint   Patient presents with    COVID-19 Concerns        HPI: telemed visit - developed rash after blood donation.  Went to urgent care for possible steroid injection, and was subsequently tested for COVID.      She is asymptomatic, but was given paxlovid.    Anxiety continues to be stable on cymbalta.  Actively seeing therapist now!    ROS: rash      General:  AOx3, well nourished and developed in no acute distress  Eyes:  PERRLA, EOMI, vision intact grossly  Neck:  trachea midline with no masses or thyromegaly  Musculoskeletal:  Normal posture.  Normal muscular development.  Neurological:  CN II-XII grossly intact based off of video interaction  Psych:  Normal mood and affect.  Able to demonstrate good judgement and personal insight.      Assessment/Plan:    Anxiety    COVID       Stable on cymbalta - continue therapy  Advised on clinical course    The patient location is: home  The chief complaint leading to consultation is: covid concerns and anxiety management    Visit type: audiovisual    Face to Face time with patient: 15  15 minutes of total time spent on the encounter, which includes face to face time and non-face to face time preparing to see the patient (eg, review of tests), Obtaining and/or reviewing separately obtained history, Documenting clinical information in the electronic or other health record, Independently interpreting results (not separately reported) and communicating results to the patient/family/caregiver, or Care coordination (not separately reported).         Each patient to whom he or she provides medical services by telemedicine is:  (1) informed of the relationship between the physician and patient and the respective role of any other health care provider with respect to management of the patient; and (2) notified that he or she may decline to receive medical services by telemedicine and may withdraw from such care at any time.    Notes:

## 2022-08-22 ENCOUNTER — PATIENT MESSAGE (OUTPATIENT)
Dept: FAMILY MEDICINE | Facility: CLINIC | Age: 27
End: 2022-08-22
Payer: COMMERCIAL

## 2022-08-22 ENCOUNTER — TELEPHONE (OUTPATIENT)
Dept: PRIMARY CARE CLINIC | Facility: CLINIC | Age: 27
End: 2022-08-22
Payer: COMMERCIAL

## 2022-08-22 NOTE — TELEPHONE ENCOUNTER
PA request received from [employer]  For Ozempic  Pt ID    PA initiated via [employer]  Key [id number]    Awaiting response

## 2022-08-26 ENCOUNTER — PATIENT MESSAGE (OUTPATIENT)
Dept: FAMILY MEDICINE | Facility: CLINIC | Age: 27
End: 2022-08-26
Payer: COMMERCIAL

## 2022-08-29 NOTE — TELEPHONE ENCOUNTER
Patient would like her ozempic to be sent to the [facility] on [address]. Medication and pharmacy to reflect changes pt requested.

## 2022-09-19 ENCOUNTER — OFFICE VISIT (OUTPATIENT)
Dept: OPTOMETRY | Facility: CLINIC | Age: 27
End: 2022-09-19
Payer: COMMERCIAL

## 2022-09-19 DIAGNOSIS — H52.223 REGULAR ASTIGMATISM OF BOTH EYES: ICD-10-CM

## 2022-09-19 DIAGNOSIS — H53.143 ASTHENOPIA, BILATERAL: ICD-10-CM

## 2022-09-19 DIAGNOSIS — G43.001 MIGRAINE WITHOUT AURA AND WITH STATUS MIGRAINOSUS, NOT INTRACTABLE: Primary | ICD-10-CM

## 2022-09-19 PROCEDURE — 99999 PR PBB SHADOW E&M-EST. PATIENT-LVL III: CPT | Mod: PBBFAC,,, | Performed by: OPTOMETRIST

## 2022-09-19 PROCEDURE — 92014 COMPRE OPH EXAM EST PT 1/>: CPT | Mod: S$GLB,,, | Performed by: OPTOMETRIST

## 2022-09-19 PROCEDURE — 1159F MED LIST DOCD IN RCRD: CPT | Mod: CPTII,S$GLB,, | Performed by: OPTOMETRIST

## 2022-09-19 PROCEDURE — 92014 PR EYE EXAM, EST PATIENT,COMPREHESV: ICD-10-PCS | Mod: S$GLB,,, | Performed by: OPTOMETRIST

## 2022-09-19 PROCEDURE — 1159F PR MEDICATION LIST DOCUMENTED IN MEDICAL RECORD: ICD-10-PCS | Mod: CPTII,S$GLB,, | Performed by: OPTOMETRIST

## 2022-09-19 PROCEDURE — 99999 PR PBB SHADOW E&M-EST. PATIENT-LVL III: ICD-10-PCS | Mod: PBBFAC,,, | Performed by: OPTOMETRIST

## 2022-09-19 PROCEDURE — 92015 DETERMINE REFRACTIVE STATE: CPT | Mod: S$GLB,,, | Performed by: OPTOMETRIST

## 2022-09-19 PROCEDURE — 92015 PR REFRACTION: ICD-10-PCS | Mod: S$GLB,,, | Performed by: OPTOMETRIST

## 2022-09-19 NOTE — PROGRESS NOTES
HPI    Routine eye exam-dle-1 year outside [facility]    Pt complains of squinting to see distance. Complains of migraines. No   flashes or floaters.  Last edited by Bernadette Strickland on 9/19/2022  3:19 PM.        ROS    Positive for: Eyes  Negative for: Constitutional, Gastrointestinal, Neurological, Skin,   Genitourinary, Musculoskeletal, HENT, Endocrine, Cardiovascular,   Respiratory, Psychiatric, Allergic/Imm, Heme/Lymph  Last edited by LETICIA Londono, OD on 9/19/2022  3:32 PM.        Assessment /Plan     For exam results, see Encounter Report.    Migraine without aura and with status migrainosus, not intractable    Asthenopia, bilateral    Regular astigmatism of both eyes      HAs not as frequent as in past, does not appear to be ocular in origin   Some eyestrain with long work hours on computer / some night driving glare issues  Low Rx---will continue with current    Had Crx in 2020 which showed slight hyperopia, near Rx given  Last doc gave slight myopic Rx, which pt notes does help w/ distance tasks vs uncorrected   Did not recheck CRx today---advised continue with prn wear of current low myopic specs    Discouraged new cl's fit w/ low Rx and astigmatism     Discussed and educated patient on current findings /plan.  RTC 1 year, prn if any changes / issues

## 2022-09-19 NOTE — PATIENT INSTRUCTIONS
"DRY EYES -- BURNING OR SHAHAB SYMPTOMS:  Use Over The Counter artificial tears as needed for dry eye symptoms.   Some common brands include:  Systane, Optive, Refresh, and Thera-Tears.  These drops can be used as frequently as desired, but may be most helpful use during long periods of concentrated work.  For example, reading / working at the computer. Start with 3-4x per day.     Nighttime Ophthalmic gel or ointments are available: Refresh PM, Genteal, and Lacrilube.    Avoid drops that "get redness out" (Visine, Murine, Clear Eyes), as these may contain medication that could further irritate the eyes, especially with chronic use.    ALLERGY EYES -- ITCHING SYMPTOMS:  Over the counter medications include--Pataday, Zaditor, and Alaway.  Use as directed 1-2 drops daily for symptoms of itching / watering eyes.  These drops will not help for dry eye or exposure symptoms.    REDNESS RELIEF:  Lumify---is a good redness reliever that will not cause irritation if used chronically.        FLASHES / FLOATERS / POSTERIOR VITREOUS DETACHMENT    Call the clinic if you have any further changes in symptoms.  Including:  Increased numbers of floaters or flashing lights, dimness or darkness that moves through or stays constant in your vision, or any pain in the eye (s).    You may sometimes see small specks or clouds moving in your field of vision.  They are called FLOATERS.  You can often see them when looking at a plain background, like a blank wall or blue niru.  Floaters are actually tiny clumps of gel or cells inside the VITREOUS, the clear jelly-like fluid that fills the inside of your eye.    While these objects look like they are in front of your eye, they are actually floating inside.  What you see are the shadows they cast on the RETINA, the nerve layer at the back of the eye that senses light and allows you to see.      POSTERIOR VITREOUS DETACHMENT    The appearance of new floaters may be alarming.  If you suddenly " develop new floaters, you should contact your eye care professional  right away.    The retina can tear if the shrinking vitreous pulls away from the wall of the eye.  This sometimes causes a small amount of bleeding in the eye that may appear as new floaters.    A torn retina is always a serious problem, since it can lead to a retinal detachment.  You should see your eye care professional as soon as possible if:    even one new floater appears suddenly;  you see sudden flashes of light;  you notice other symptoms, like the loss of side vision, or a curtain closes down in your vision        POSTERIOR VITREOUS DETACHMENT is more common for people who:    are nearsighted;  have had cataract surgery;  have had YAG laser surgery of the eye;  have had inflammation inside the eye;  are over age 60.      While some floaters may remain visible, many of them will fade over time and become less noticeable.  Even if you've had some floaters for years, you should have your eyes checked as soon as possible if you notice new ones.    FLASHING LIGHTS    When the vitreous gel rubs or pulls on the retina, you may see what look like flashing lights or lightning streaks.  These flashes can appear off and on for several weeks or months.      Some people experience flashes of light that appear as jagged lines or heat waves in both eyes, lasting 10-20 minutes.  These flashes are caused by a spasm of blood vessels in the brain, which is called a migraine.    If a headache follows these flashes, it's called a migraine headache.  If   no headache occurs, these flashes are called Ophthalmic or Ocular Migraine.

## 2023-01-05 ENCOUNTER — PATIENT MESSAGE (OUTPATIENT)
Dept: FAMILY MEDICINE | Facility: CLINIC | Age: 28
End: 2023-01-05
Payer: COMMERCIAL

## 2023-01-06 RX ORDER — ONDANSETRON 8 MG/1
8 TABLET, ORALLY DISINTEGRATING ORAL EVERY 12 HOURS PRN
Qty: 20 TABLET | Refills: 6 | Status: SHIPPED | OUTPATIENT
Start: 2023-01-06

## 2023-02-13 ENCOUNTER — PATIENT MESSAGE (OUTPATIENT)
Dept: FAMILY MEDICINE | Facility: CLINIC | Age: 28
End: 2023-02-13
Payer: COMMERCIAL

## 2023-05-02 ENCOUNTER — OFFICE VISIT (OUTPATIENT)
Dept: FAMILY MEDICINE | Facility: CLINIC | Age: 28
End: 2023-05-02
Payer: COMMERCIAL

## 2023-05-02 VITALS
WEIGHT: 232.63 LBS | DIASTOLIC BLOOD PRESSURE: 80 MMHG | OXYGEN SATURATION: 98 % | SYSTOLIC BLOOD PRESSURE: 120 MMHG | BODY MASS INDEX: 41.22 KG/M2 | HEART RATE: 94 BPM | HEIGHT: 63 IN

## 2023-05-02 DIAGNOSIS — J01.10 ACUTE NON-RECURRENT FRONTAL SINUSITIS: ICD-10-CM

## 2023-05-02 DIAGNOSIS — E66.9 OBESITY, UNSPECIFIED CLASSIFICATION, UNSPECIFIED OBESITY TYPE, UNSPECIFIED WHETHER SERIOUS COMORBIDITY PRESENT: ICD-10-CM

## 2023-05-02 DIAGNOSIS — F41.9 ANXIETY: Primary | ICD-10-CM

## 2023-05-02 PROCEDURE — 1159F MED LIST DOCD IN RCRD: CPT | Mod: S$GLB,,, | Performed by: FAMILY MEDICINE

## 2023-05-02 PROCEDURE — 3079F PR MOST RECENT DIASTOLIC BLOOD PRESSURE 80-89 MM HG: ICD-10-PCS | Mod: S$GLB,,, | Performed by: FAMILY MEDICINE

## 2023-05-02 PROCEDURE — 3074F SYST BP LT 130 MM HG: CPT | Mod: S$GLB,,, | Performed by: FAMILY MEDICINE

## 2023-05-02 PROCEDURE — 99214 OFFICE O/P EST MOD 30 MIN: CPT | Mod: S$GLB,,, | Performed by: FAMILY MEDICINE

## 2023-05-02 PROCEDURE — 1159F PR MEDICATION LIST DOCUMENTED IN MEDICAL RECORD: ICD-10-PCS | Mod: S$GLB,,, | Performed by: FAMILY MEDICINE

## 2023-05-02 PROCEDURE — 3079F DIAST BP 80-89 MM HG: CPT | Mod: S$GLB,,, | Performed by: FAMILY MEDICINE

## 2023-05-02 PROCEDURE — 3008F PR BODY MASS INDEX (BMI) DOCUMENTED: ICD-10-PCS | Mod: S$GLB,,, | Performed by: FAMILY MEDICINE

## 2023-05-02 PROCEDURE — 3008F BODY MASS INDEX DOCD: CPT | Mod: S$GLB,,, | Performed by: FAMILY MEDICINE

## 2023-05-02 PROCEDURE — 99214 PR OFFICE/OUTPT VISIT, EST, LEVL IV, 30-39 MIN: ICD-10-PCS | Mod: S$GLB,,, | Performed by: FAMILY MEDICINE

## 2023-05-02 PROCEDURE — 3074F PR MOST RECENT SYSTOLIC BLOOD PRESSURE < 130 MM HG: ICD-10-PCS | Mod: S$GLB,,, | Performed by: FAMILY MEDICINE

## 2023-05-02 RX ORDER — PROPRANOLOL HYDROCHLORIDE 10 MG/1
10 TABLET ORAL 3 TIMES DAILY PRN
Qty: 90 TABLET | Refills: 11 | Status: SHIPPED | OUTPATIENT
Start: 2023-05-02 | End: 2024-05-01

## 2023-05-02 RX ORDER — METFORMIN HYDROCHLORIDE 500 MG/1
500 TABLET ORAL 2 TIMES DAILY WITH MEALS
Qty: 180 TABLET | Refills: 3 | Status: SHIPPED | OUTPATIENT
Start: 2023-05-02 | End: 2023-08-23 | Stop reason: ALTCHOICE

## 2023-05-02 RX ORDER — AMOXICILLIN AND CLAVULANATE POTASSIUM 400; 57 MG/5ML; MG/5ML
10 POWDER, FOR SUSPENSION ORAL EVERY 12 HOURS
Qty: 140 ML | Refills: 0 | Status: SHIPPED | OUTPATIENT
Start: 2023-05-02 | End: 2023-05-09

## 2023-05-02 RX ORDER — IPRATROPIUM BROMIDE 42 UG/1
2 SPRAY, METERED NASAL 2 TIMES DAILY
Qty: 15 ML | Refills: 3 | Status: SHIPPED | OUTPATIENT
Start: 2023-05-02 | End: 2023-06-01

## 2023-05-02 RX ORDER — DULOXETIN HYDROCHLORIDE 20 MG/1
20 CAPSULE, DELAYED RELEASE ORAL DAILY
Qty: 30 CAPSULE | Refills: 11 | Status: SHIPPED | OUTPATIENT
Start: 2023-05-02

## 2023-05-02 NOTE — PROGRESS NOTES
"    [facility]  Primary Care Clinics - [place], [place]    Family Medicine Office Visit    Chief Complaint   Patient presents with    Nasal Congestion     Medication Refills         HPI:  follow up chronic conditions    Reporting nasal congestion today - does have some baseline nasal turbinate hypertrophy.  Has been having purulent rhinorrhea and worsening symptoms for the past 4 days.      Anxiety stable on cymbalta - needs refills    ROS: as above    Vitals:    05/02/23 1153   BP: 120/80   BP Location: Left arm   Patient Position: Sitting   BP Method: Large (Manual)   Pulse: 94   SpO2: 98%   Weight: 105.5 kg (232 lb 9.6 oz)   Height: 5' 3" (1.6 m)      Body mass index is 41.2 kg/m².      General:  AOx3, well nourished and developed in no acute distress  Eyes:  PERRLA, EOMI, vision intact grossly  ENT:  normal hearing, moist oral mucosa  Neck:  trachea midline with no masses or thyromegaly  Heart:  RRR, no murmurs.  No edema noted, extremities warm and well perfused  Lungs:  clear to auscultation bilaterally with symmetric chest movement  Abdomen:  Soft, nontender, nondistended.  Normal bowel sounds  Musculoskeletal:  Normal gait.  Normal posture.  Normal muscular development with no joint swelling.  Neurological:  CN II-XII grossly intact. Symmetric strength and sensation  Psych:  Normal mood and affect.  Able to demonstrate good judgement and personal insight.      Assessment/Plan:    1. Anxiety    2. Acute non-recurrent frontal sinusitis    3. Obesity, unspecified classification, unspecified obesity type, unspecified whether serious comorbidity present        Stable, continue medication, and add propranolol  Start nasal spray, fill antibiotic if needed  Start metformin today      "

## 2023-05-02 NOTE — PATIENT INSTRUCTIONS
Use propranolol as needed for stress  Filled cymbalta    Start nasal spray, if symptoms don't get better, fill antibiotic

## 2023-05-02 NOTE — LETTER
May 2, 2023      [place] [id number] [facility] [place] [address]  Phone: [phone]  Fax: [phone]       Patient: Maureen Barrera   YOB: 1995  Date of Visit: 05/02/2023    To Whom It May Concern:    Diallo Barrera  was at [facility] on 05/02/2023. The patient may return to work/school on 5/4/2023 with no restrictions. If you have any questions or concerns, or if I can be of further assistance, please do not hesitate to contact me.    Sincerely,    Yogesh Johnson MD

## 2023-05-18 RX ORDER — NORGESTIMATE AND ETHINYL ESTRADIOL 7DAYSX3 LO
KIT ORAL
Qty: 84 TABLET | Refills: 0 | Status: SHIPPED | OUTPATIENT
Start: 2023-05-18

## 2023-06-14 NOTE — TELEPHONE ENCOUNTER
Work excuse can be written    Adbry Counseling: I discussed with the patient the risks of tralokinumab including but not limited to eye infection and irritation, cold sores, injection site reactions, worsening of asthma, allergic reactions and increased risk of parasitic infection.  Live vaccines should be avoided while taking tralokinumab. The patient understands that monitoring is required and they must alert us or the primary physician if symptoms of infection or other concerning signs are noted.

## 2023-08-07 PROBLEM — J01.10 ACUTE NON-RECURRENT FRONTAL SINUSITIS: Status: RESOLVED | Noted: 2023-05-02 | Resolved: 2023-08-07

## 2023-08-23 ENCOUNTER — OFFICE VISIT (OUTPATIENT)
Dept: OBSTETRICS AND GYNECOLOGY | Facility: CLINIC | Age: 28
End: 2023-08-23
Payer: COMMERCIAL

## 2023-08-23 ENCOUNTER — TELEPHONE (OUTPATIENT)
Dept: OBSTETRICS AND GYNECOLOGY | Facility: CLINIC | Age: 28
End: 2023-08-23
Payer: COMMERCIAL

## 2023-08-23 VITALS
WEIGHT: 235.25 LBS | BODY MASS INDEX: 41.68 KG/M2 | HEIGHT: 63 IN | DIASTOLIC BLOOD PRESSURE: 90 MMHG | SYSTOLIC BLOOD PRESSURE: 138 MMHG

## 2023-08-23 DIAGNOSIS — Z01.419 GYNECOLOGIC EXAM NORMAL: Primary | ICD-10-CM

## 2023-08-23 PROCEDURE — 99999 PR PBB SHADOW E&M-EST. PATIENT-LVL III: CPT | Mod: PBBFAC,,, | Performed by: OBSTETRICS & GYNECOLOGY

## 2023-08-23 PROCEDURE — 99395 PR PREVENTIVE VISIT,EST,18-39: ICD-10-PCS | Mod: S$GLB,,, | Performed by: OBSTETRICS & GYNECOLOGY

## 2023-08-23 PROCEDURE — 1159F PR MEDICATION LIST DOCUMENTED IN MEDICAL RECORD: ICD-10-PCS | Mod: CPTII,S$GLB,, | Performed by: OBSTETRICS & GYNECOLOGY

## 2023-08-23 PROCEDURE — 3075F SYST BP GE 130 - 139MM HG: CPT | Mod: CPTII,S$GLB,, | Performed by: OBSTETRICS & GYNECOLOGY

## 2023-08-23 PROCEDURE — 99999 PR PBB SHADOW E&M-EST. PATIENT-LVL III: ICD-10-PCS | Mod: PBBFAC,,, | Performed by: OBSTETRICS & GYNECOLOGY

## 2023-08-23 PROCEDURE — 3075F PR MOST RECENT SYSTOLIC BLOOD PRESS GE 130-139MM HG: ICD-10-PCS | Mod: CPTII,S$GLB,, | Performed by: OBSTETRICS & GYNECOLOGY

## 2023-08-23 PROCEDURE — 99395 PREV VISIT EST AGE 18-39: CPT | Mod: S$GLB,,, | Performed by: OBSTETRICS & GYNECOLOGY

## 2023-08-23 PROCEDURE — 3008F BODY MASS INDEX DOCD: CPT | Mod: CPTII,S$GLB,, | Performed by: OBSTETRICS & GYNECOLOGY

## 2023-08-23 PROCEDURE — 88175 CYTOPATH C/V AUTO FLUID REDO: CPT | Performed by: OBSTETRICS & GYNECOLOGY

## 2023-08-23 PROCEDURE — 3080F DIAST BP >= 90 MM HG: CPT | Mod: CPTII,S$GLB,, | Performed by: OBSTETRICS & GYNECOLOGY

## 2023-08-23 PROCEDURE — 3008F PR BODY MASS INDEX (BMI) DOCUMENTED: ICD-10-PCS | Mod: CPTII,S$GLB,, | Performed by: OBSTETRICS & GYNECOLOGY

## 2023-08-23 PROCEDURE — 3080F PR MOST RECENT DIASTOLIC BLOOD PRESSURE >= 90 MM HG: ICD-10-PCS | Mod: CPTII,S$GLB,, | Performed by: OBSTETRICS & GYNECOLOGY

## 2023-08-23 PROCEDURE — 1159F MED LIST DOCD IN RCRD: CPT | Mod: CPTII,S$GLB,, | Performed by: OBSTETRICS & GYNECOLOGY

## 2023-08-23 NOTE — TELEPHONE ENCOUNTER
Spoke with patient regarding appt on today. Appt rescheduled to 3 pm today. Patient verb understanding.
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
## 2023-08-23 NOTE — PROGRESS NOTES
Chief Complaint   Patient presents with    Well Woman       History and Physical:  Patient's last menstrual period was 2023 (exact date).       Maureen Barrera is a 28 y.o.   female who presents today for her routine annual GYN exam. The patient has no Gynecology complaints today. Trying to conceive, off of oral contraceptive pills  -menses q 28 days, counseled.    Start PNV      Allergies:   Review of patient's allergies indicates:   Allergen Reactions    Chloraprep clear [chlorhexidin-isopropyl alcohol]     Reglan [metoclopramide]        Past Medical History:   Diagnosis Date    Anxiety     Eczema     Fracture of left foot     GERD (gastroesophageal reflux disease)     Headache(784.0)     Lactose intolerance     Polyp of nasal cavity     PONV (postoperative nausea and vomiting)     Recurrent upper respiratory infection (URI)     Sprain of right ankle 10/26/11    Varicella     2nd grade       Past Surgical History:   Procedure Laterality Date    ARTHROSCOPY OF ANKLE WITH DEBRIDEMENT Left 2018    Procedure: ARTHROSCOPY, ANKLE, WITH DEBRIDEMENT;  Surgeon: Jimmie Red MD;  Location: [facility] OR;  Service: Orthopedics;  Laterality: Left;    CHOLECYSTECTOMY  2018    ENDOSCOPIC CARPAL TUNNEL RELEASE Right 2023    FRACTURE SURGERY      LAPAROSCOPIC CHOLECYSTECTOMY N/A 07/10/2019    Procedure: CHOLECYSTECTOMY, LAPAROSCOPIC;  Surgeon: Carlos Alberto Baker MD;  Location: [facility] OR;  Service: General;  Laterality: N/A;    PLANTAR'S WART EXCISION  2015    POLYPECTOMY      Nasal    SPHENOIDECTOMY         MEDS:   Current Outpatient Medications on File Prior to Visit   Medication Sig Dispense Refill    DULoxetine (CYMBALTA) 20 MG capsule Take 1 capsule (20 mg total) by mouth once daily. 30 capsule 11    melatonin (MELATIN) 3 mg tablet       norgestimate-ethinyl estradioL (TRI-LO-SPRINTEC) 0.18/0.215/0.25 mg-25 mcg tablet TAKE 1 TABLET BY MOUTH EVERY DAY 84 tablet 0     albuterol (VENTOLIN HFA) 90 mcg/actuation inhaler Inhale 2 puffs into the lungs every 4 (four) hours as needed for Wheezing. Rescue (Patient not taking: Reported on 2023) 18 g 3    hydrOXYzine HCL (ATARAX) 25 MG tablet Take 1 tablet (25 mg total) by mouth nightly as needed for Itching. (Patient not taking: Reported on 2023) 30 tablet 6    ondansetron (ZOFRAN-ODT) 8 MG TbDL Take 1 tablet (8 mg total) by mouth every 12 (twelve) hours as needed (nausea). (Patient not taking: Reported on 2023) 20 tablet 6    propranoloL (INDERAL) 10 MG tablet Take 1 tablet (10 mg total) by mouth 3 (three) times daily as needed (stress). (Patient not taking: Reported on 2023) 90 tablet 11    scopolamine (TRANSDERM-SCOP) 1.3-1.5 mg (1 mg over 3 days) Place 1 patch onto the skin every 72 hours. (Patient not taking: Reported on 2023) 4 patch 1    [DISCONTINUED] metFORMIN (GLUCOPHAGE) 500 MG tablet Take 1 tablet (500 mg total) by mouth 2 (two) times daily with meals. (Patient not taking: Reported on 2023) 180 tablet 3     No current facility-administered medications on file prior to visit.       OB History          0    Para   0    Term   0       0    AB   0    Living   0         SAB   0    IAB   0    Ectopic   0    Multiple   0    Live Births   0                 Social History     Socioeconomic History    Marital status: Significant Other   Occupational History    Occupation:    Tobacco Use    Smoking status: Never     Passive exposure: Yes    Smokeless tobacco: Never    Tobacco comments:     Vapes CBD oil. Not as frequent. SL PRN per pt   Substance and Sexual Activity    Alcohol use: Not Currently     Comment: once a month    Drug use: Never    Sexual activity: Yes     Partners: Male     Comment: Oral birth cintrol   Social History Narrative    Born in [place].  Moved from [place] in .       Family History   Problem Relation Age of Onset    Allergies Father     Eczema Father      "Immunodeficiency Father     Heart disease Father     Hypertension Father     Kidney disease Father     Vision loss Father     Breast cancer Mother     Arthritis Mother     Cancer Mother         Breast    Miscarriages / Stillbirths Mother     Vision loss Mother     Allergies Paternal Aunt     Eczema Paternal Aunt     Allergies Paternal Uncle     Eczema Paternal Uncle     Immunodeficiency Maternal Grandmother     Breast cancer Maternal Grandmother     Ovarian cancer Maternal Grandmother     Asthma Maternal Grandmother     Cancer Maternal Grandmother         Lung, breast    Allergies Paternal Grandmother     Angioedema Paternal Grandmother     Eczema Paternal Grandmother     Cancer Paternal Grandmother         Breast    Allergies Paternal Grandfather     Eczema Paternal Grandfather     Depression Paternal Grandfather     Hearing loss Paternal Grandfather     Hearing loss Paternal Aunt     Allergic rhinitis Neg Hx     Atopy Neg Hx     Rhinitis Neg Hx     Urticaria Neg Hx          Past medical and surgical history reviewed.   I have reviewed the patient's medical history in detail and updated the computerized patient record.    Review of System:   General: no chills, fever, night sweats, weight gain or weight loss  Psychological: no depression or suicidal ideation  Breasts: no new or changing breast lumps, nipple discharge or masses.  Respiratory: no cough, shortness of breath, or wheezing  Cardiovascular: no chest pain or dyspnea on exertion  Gastrointestinal: no abdominal pain, change in bowel habits, or black or bloody stools  Genito-Urinary: no incontinence, urinary frequency/urgency or vulvar/vaginal symptoms, pelvic pain or abnormal vaginal bleeding.  Musculoskeletal: no gait disturbance or muscular weakness      Physical Exam:   BP (!) 138/90   Ht 5' 3" (1.6 m)   Wt 106.7 kg (235 lb 3.7 oz)   LMP 08/09/2023 (Exact Date)   BMI 41.67 kg/m²   Constitutional: She appears alert and responsive. She appears " well-developed, well-groomed, and well-nourished. No distress. OverWeight   HENT:   Head: Normocephalic and atraumatic.   Eyes: Conjunctivae and EOM are normal. No scleral icterus.   Neck: Symmetrical. Normal range of motion. Neck supple. No tracheal deviation present.   Cardiovascular: Normal rate, no rhythm abnormality noted. Extremities without swelling or edema, warm.    Pulmonary/Chest: Normal respiratory Effort. No distress or retractions. She exhibits no tenderness.  Breasts: are symmetrical.   Right breast exhibits no inverted nipple, no mass, no nipple discharge, no skin change and no tenderness.   Left breast exhibits no inverted nipple, no mass, no nipple discharge, no skin change and no tenderness.  Abdominal: Soft. She exhibits no distension, hernias or masses. There is no tenderness. No enlargement of liver edge or spleen.  There is no rebound and no guarding.   Genitourinary:    External rectal exam shows no thrombosed external hemorrhoids, no lesions.     Pelvic exam was performed with patient supine.   No labial fusion, and symmetrical.    There is no rash, lesion or injury on the right labia.   There is no rash, lesion or injury on the left labia.   No bleeding and no signs of injury around the vaginal introitus, urethral meatus is normal size and without prolapse or lesions, urethra well supported. The cervix is visualized with no discharge, lesions or friability.   No vaginal discharge found.    No significant Cystocele, Enterocele or rectocele, and cervix and uterus well supported.   Bimanual exam:   The urethra is normal to palpation and there are no palpable vaginal wall masses.   Uterus is not deviated, not enlarged, not fixed, normal shape and not tender.   Cervix exhibits no motion tenderness.    Right adnexum displays no mass or nodularity and no tenderness.   Left adnexum displays no mass or nodularity and no tenderness.  Musculoskeletal: Normal range of motion.   Neurological: She is  alert and oriented to person, place, and time. Coordination normal.   Skin: Skin is warm and dry. She is not diaphoretic. No rashes, lesions or ulcers.   Psychiatric: She has a normal mood and affect, oriented to person, place, and time.      Assessment:   Normal annual GYN exam  1. Gynecologic exam normal  Liquid-Based Pap Smear, Screening      Trying to conceive, counseled.     Plan:   PAP  Follow up 1/2024 if no luck with pregnancy by then.   Follow up in 1 year.  Patient informed will be contacted with results within 2 weeks. Encouraged to please call back or email if she has not heard from us by then.

## 2023-08-28 ENCOUNTER — PATIENT MESSAGE (OUTPATIENT)
Dept: OBSTETRICS AND GYNECOLOGY | Facility: CLINIC | Age: 28
End: 2023-08-28
Payer: COMMERCIAL

## 2023-08-28 LAB
FINAL PATHOLOGIC DIAGNOSIS: NORMAL
Lab: NORMAL

## 2023-08-29 ENCOUNTER — PATIENT MESSAGE (OUTPATIENT)
Dept: OBSTETRICS AND GYNECOLOGY | Facility: CLINIC | Age: 28
End: 2023-08-29
Payer: COMMERCIAL

## 2023-10-19 ENCOUNTER — PATIENT MESSAGE (OUTPATIENT)
Dept: FAMILY MEDICINE | Facility: CLINIC | Age: 28
End: 2023-10-19

## 2023-11-06 ENCOUNTER — PATIENT MESSAGE (OUTPATIENT)
Dept: FAMILY MEDICINE | Facility: CLINIC | Age: 28
End: 2023-11-06
Payer: COMMERCIAL

## 2023-12-23 NOTE — TELEPHONE ENCOUNTER
Reason for Disposition   Mild eye allergy (all triage questions negative)    Protocols used:  EYE - ALLERGY-A-AH  pt states she is not sure if she has pink eye or not/ c/o itching above and below 1 eye only/ skin very tender now/ denies pinkness to sclera/ + tearing/ denies visual changes/   Has appt w/ pcp tomorrow    Advised to keep appt--  Interim care p/protocol    Evon Francis RN   Patient requests all Lab, Cardiology, and Radiology Results on their Discharge Instructions

## 2024-01-16 ENCOUNTER — OFFICE VISIT (OUTPATIENT)
Dept: OTOLARYNGOLOGY | Facility: CLINIC | Age: 29
End: 2024-01-16
Payer: COMMERCIAL

## 2024-01-16 VITALS — WEIGHT: 237.13 LBS | BODY MASS INDEX: 42.02 KG/M2 | HEIGHT: 63 IN

## 2024-01-16 DIAGNOSIS — H91.90 HEARING LOSS, UNSPECIFIED HEARING LOSS TYPE, UNSPECIFIED LATERALITY: Primary | ICD-10-CM

## 2024-01-16 PROCEDURE — 99999 PR PBB SHADOW E&M-EST. PATIENT-LVL III: CPT | Mod: PBBFAC,,, | Performed by: OTOLARYNGOLOGY

## 2024-01-16 PROCEDURE — 1159F MED LIST DOCD IN RCRD: CPT | Mod: S$GLB,,, | Performed by: OTOLARYNGOLOGY

## 2024-01-16 PROCEDURE — 99203 OFFICE O/P NEW LOW 30 MIN: CPT | Mod: S$GLB,,, | Performed by: OTOLARYNGOLOGY

## 2024-01-16 PROCEDURE — 3008F BODY MASS INDEX DOCD: CPT | Mod: S$GLB,,, | Performed by: OTOLARYNGOLOGY

## 2024-01-16 PROCEDURE — 1160F RVW MEDS BY RX/DR IN RCRD: CPT | Mod: S$GLB,,, | Performed by: OTOLARYNGOLOGY

## 2024-01-16 NOTE — PROGRESS NOTES
Subjective:       Patient ID: Maureen Barrera is a 28 y.o. female.    Chief Complaint: Hearing Loss (Pt c/o hearing loss and ear pain for a couple of months )      This generally healthy 28-year-old tells me that for years she is felt like her hearing was week but in the past few months it seemed to change significantly and she is having trouble communicating with her family even when it should be of a volume that is easy to hear.  She does have some family history of hearing loss for example her father struggles with hearing although I do not think he has a hearing aid as we discuss this.                Objective:      ENT Physical Exam    Her tympanic membranes and ear canals look fine that has no evidence of effusion or abnormality     Her nasal exam shows a slight nasal septal deviation but not prominent in her oropharynx is normal and easy to examine.        Assessment:       1. Hearing loss, unspecified hearing loss type, unspecified laterality         Plan:          So her exam does not explain her problems hearing and I have scheduled her for an audiogram tomorrow and we will discuss the findings.

## 2024-01-23 ENCOUNTER — PATIENT MESSAGE (OUTPATIENT)
Dept: FAMILY MEDICINE | Facility: CLINIC | Age: 29
End: 2024-01-23
Payer: COMMERCIAL

## 2024-01-24 ENCOUNTER — CLINICAL SUPPORT (OUTPATIENT)
Dept: AUDIOLOGY | Facility: CLINIC | Age: 29
End: 2024-01-24
Payer: COMMERCIAL

## 2024-01-24 ENCOUNTER — PATIENT MESSAGE (OUTPATIENT)
Dept: OTOLARYNGOLOGY | Facility: CLINIC | Age: 29
End: 2024-01-24
Payer: COMMERCIAL

## 2024-01-24 DIAGNOSIS — Z01.10 NORMAL HEARING EXAM: Primary | ICD-10-CM

## 2024-01-24 DIAGNOSIS — H93.13 TINNITUS AURIUM, BILATERAL: ICD-10-CM

## 2024-01-24 PROCEDURE — 92567 TYMPANOMETRY: CPT | Mod: S$GLB,,, | Performed by: AUDIOLOGIST

## 2024-01-24 PROCEDURE — 92557 COMPREHENSIVE HEARING TEST: CPT | Mod: S$GLB,,, | Performed by: AUDIOLOGIST

## 2024-01-24 NOTE — PROGRESS NOTES
Maureen Barrera was seen 01/24/2024 for an audiological evaluation. Pt was alone during today's visit. Pertinent complains today include difficulty understanding certain voices in noise and occasional tinnitus. Pt confirms history of loud noise exposure and confirms early onset of genetic family history of hearing loss. Otoscopy revealed no cerumen in both ears. The tympanic membrane was visualized AU prior to proceeding with the hearing testing.      Results reveal normal hearing from 250-8000Hz bilaterally.    Speech Reception Thresholds were  10 dBHL for the right ear and 10 dBHL for the left ear.    Word recognition scores were excellent bilaterally.   Tympanograms were Type A for the right ear and Type A for the left ear.    Audiogram results were reviewed in detail with patient and all questions were answered. Results will be reviewed by the referring provider at the completion of this note. Recommend repeat hearing testing if problems arise and bilateral hearing protection with either muffs or in-ear protection in loud noises. All complaints were addressed during this visit to the patient's satisfaction. Plan of care was discussed in detail with the patient, who agreed with the plan as above.

## 2024-04-12 ENCOUNTER — PATIENT MESSAGE (OUTPATIENT)
Dept: FAMILY MEDICINE | Facility: CLINIC | Age: 29
End: 2024-04-12
Payer: COMMERCIAL

## 2024-04-19 ENCOUNTER — OFFICE VISIT (OUTPATIENT)
Dept: FAMILY MEDICINE | Facility: CLINIC | Age: 29
End: 2024-04-19
Payer: COMMERCIAL

## 2024-04-19 ENCOUNTER — LAB VISIT (OUTPATIENT)
Dept: LAB | Facility: CLINIC | Age: 29
End: 2024-04-19
Payer: COMMERCIAL

## 2024-04-19 VITALS
DIASTOLIC BLOOD PRESSURE: 80 MMHG | BODY MASS INDEX: 41.18 KG/M2 | HEART RATE: 95 BPM | HEIGHT: 63 IN | SYSTOLIC BLOOD PRESSURE: 126 MMHG | WEIGHT: 232.38 LBS | OXYGEN SATURATION: 100 %

## 2024-04-19 DIAGNOSIS — Z00.00 ANNUAL PHYSICAL EXAM: Primary | ICD-10-CM

## 2024-04-19 DIAGNOSIS — Z00.00 ANNUAL PHYSICAL EXAM: ICD-10-CM

## 2024-04-19 LAB
25(OH)D3+25(OH)D2 SERPL-MCNC: 23 NG/ML (ref 30–96)
ALBUMIN SERPL BCP-MCNC: 4.2 G/DL (ref 3.5–5.2)
ALP SERPL-CCNC: 100 U/L (ref 55–135)
ALT SERPL W/O P-5'-P-CCNC: 37 U/L (ref 10–44)
ANION GAP SERPL CALC-SCNC: 10 MMOL/L (ref 8–16)
AST SERPL-CCNC: 23 U/L (ref 10–40)
BASOPHILS # BLD AUTO: 0.08 K/UL (ref 0–0.2)
BASOPHILS NFR BLD: 0.8 % (ref 0–1.9)
BILIRUB SERPL-MCNC: 0.4 MG/DL (ref 0.1–1)
BUN SERPL-MCNC: 14 MG/DL (ref 6–20)
CALCIUM SERPL-MCNC: 9.5 MG/DL (ref 8.7–10.5)
CHLORIDE SERPL-SCNC: 106 MMOL/L (ref 95–110)
CHOLEST SERPL-MCNC: 176 MG/DL (ref 120–199)
CHOLEST/HDLC SERPL: 4.9 {RATIO} (ref 2–5)
CO2 SERPL-SCNC: 22 MMOL/L (ref 23–29)
CREAT SERPL-MCNC: 0.8 MG/DL (ref 0.5–1.4)
DIFFERENTIAL METHOD BLD: ABNORMAL
EOSINOPHIL # BLD AUTO: 0.2 K/UL (ref 0–0.5)
EOSINOPHIL NFR BLD: 1.9 % (ref 0–8)
ERYTHROCYTE [DISTWIDTH] IN BLOOD BY AUTOMATED COUNT: 14.1 % (ref 11.5–14.5)
EST. GFR  (NO RACE VARIABLE): >60 ML/MIN/1.73 M^2
ESTIMATED AVG GLUCOSE: 108 MG/DL (ref 68–131)
GLUCOSE SERPL-MCNC: 92 MG/DL (ref 70–110)
HBA1C MFR BLD: 5.4 % (ref 4–5.6)
HCG INTACT+B SERPL-ACNC: <1.2 MIU/ML
HCT VFR BLD AUTO: 37.9 % (ref 37–48.5)
HCV AB SERPL QL IA: NORMAL
HDLC SERPL-MCNC: 36 MG/DL (ref 40–75)
HDLC SERPL: 20.5 % (ref 20–50)
HGB BLD-MCNC: 12.1 G/DL (ref 12–16)
HIV 1+2 AB+HIV1 P24 AG SERPL QL IA: NORMAL
IMM GRANULOCYTES # BLD AUTO: 0.03 K/UL (ref 0–0.04)
IMM GRANULOCYTES NFR BLD AUTO: 0.3 % (ref 0–0.5)
LDLC SERPL CALC-MCNC: 115.6 MG/DL (ref 63–159)
LYMPHOCYTES # BLD AUTO: 3.3 K/UL (ref 1–4.8)
LYMPHOCYTES NFR BLD: 31.7 % (ref 18–48)
MCH RBC QN AUTO: 24.4 PG (ref 27–31)
MCHC RBC AUTO-ENTMCNC: 31.9 G/DL (ref 32–36)
MCV RBC AUTO: 76 FL (ref 82–98)
MONOCYTES # BLD AUTO: 0.6 K/UL (ref 0.3–1)
MONOCYTES NFR BLD: 5.3 % (ref 4–15)
NEUTROPHILS # BLD AUTO: 6.2 K/UL (ref 1.8–7.7)
NEUTROPHILS NFR BLD: 60 % (ref 38–73)
NONHDLC SERPL-MCNC: 140 MG/DL
NRBC BLD-RTO: 0 /100 WBC
PLATELET # BLD AUTO: 442 K/UL (ref 150–450)
PMV BLD AUTO: 9.4 FL (ref 9.2–12.9)
POTASSIUM SERPL-SCNC: 4.5 MMOL/L (ref 3.5–5.1)
PROT SERPL-MCNC: 7.9 G/DL (ref 6–8.4)
RBC # BLD AUTO: 4.96 M/UL (ref 4–5.4)
SODIUM SERPL-SCNC: 138 MMOL/L (ref 136–145)
TRIGL SERPL-MCNC: 122 MG/DL (ref 30–150)
TSH SERPL DL<=0.005 MIU/L-ACNC: 1.5 UIU/ML (ref 0.4–4)
WBC # BLD AUTO: 10.38 K/UL (ref 3.9–12.7)

## 2024-04-19 PROCEDURE — 86803 HEPATITIS C AB TEST: CPT | Performed by: FAMILY MEDICINE

## 2024-04-19 PROCEDURE — 3079F DIAST BP 80-89 MM HG: CPT | Mod: S$GLB,,, | Performed by: FAMILY MEDICINE

## 2024-04-19 PROCEDURE — 99395 PREV VISIT EST AGE 18-39: CPT | Mod: S$GLB,,, | Performed by: FAMILY MEDICINE

## 2024-04-19 PROCEDURE — 84702 CHORIONIC GONADOTROPIN TEST: CPT | Performed by: FAMILY MEDICINE

## 2024-04-19 PROCEDURE — 3008F BODY MASS INDEX DOCD: CPT | Mod: S$GLB,,, | Performed by: FAMILY MEDICINE

## 2024-04-19 PROCEDURE — 36415 COLL VENOUS BLD VENIPUNCTURE: CPT | Mod: ,,, | Performed by: FAMILY MEDICINE

## 2024-04-19 PROCEDURE — 87389 HIV-1 AG W/HIV-1&-2 AB AG IA: CPT | Performed by: FAMILY MEDICINE

## 2024-04-19 PROCEDURE — 82306 VITAMIN D 25 HYDROXY: CPT | Performed by: FAMILY MEDICINE

## 2024-04-19 PROCEDURE — 3074F SYST BP LT 130 MM HG: CPT | Mod: S$GLB,,, | Performed by: FAMILY MEDICINE

## 2024-04-19 PROCEDURE — 83036 HEMOGLOBIN GLYCOSYLATED A1C: CPT | Performed by: FAMILY MEDICINE

## 2024-04-19 PROCEDURE — 80053 COMPREHEN METABOLIC PANEL: CPT | Performed by: FAMILY MEDICINE

## 2024-04-19 PROCEDURE — 80061 LIPID PANEL: CPT | Performed by: FAMILY MEDICINE

## 2024-04-19 PROCEDURE — 1159F MED LIST DOCD IN RCRD: CPT | Mod: S$GLB,,, | Performed by: FAMILY MEDICINE

## 2024-04-19 PROCEDURE — 84443 ASSAY THYROID STIM HORMONE: CPT | Performed by: FAMILY MEDICINE

## 2024-04-19 PROCEDURE — 85025 COMPLETE CBC W/AUTO DIFF WBC: CPT | Performed by: FAMILY MEDICINE

## 2024-04-19 NOTE — PROGRESS NOTES
"    [facility]  Primary Care Clinics - [place], [place]    Family Medicine Office Visit    Chief Complaint   Patient presents with    Follow-up     F/u on route blood work.         HPI:  28 female here for routine lab concerns.  Is getting , and wants to start pregnancy planning.  Wants to have bloodwork for general health.  Wants to check HCG, so she can better plan her routine for wedding, honeymoon, etc.    ROS: as above    Vitals:    04/19/24 0820   BP: 126/80   BP Location: Right arm   Patient Position: Sitting   BP Method: Large (Manual)   Pulse: 95   SpO2: 100%   Weight: 105.4 kg (232 lb 6.4 oz)   Height: 5' 3" (1.6 m)      Body mass index is 41.17 kg/m².      General:  AOx3, well nourished and developed in no acute distress  Eyes:  PERRLA, EOMI, vision intact grossly  ENT:  normal hearing, moist oral mucosa  Neck:  trachea midline with no masses or thyromegaly  Heart:  RRR, no murmurs.  No edema noted, extremities warm and well perfused  Lungs:  clear to auscultation bilaterally with symmetric chest movement  Abdomen:  Soft, nontender, nondistended.  Normal bowel sounds  Musculoskeletal:  Normal gait.  Normal posture.  Normal muscular development with no joint swelling.  Neurological:  CN II-XII grossly intact. Symmetric strength and sensation  Psych:  Normal mood and affect.  Able to demonstrate good judgement and personal insight.      Assessment/Plan:    1. Annual physical exam  -     Hepatitis C antibody; Future; Expected date: 04/19/2024  -     CBC Auto Differential; Future; Expected date: 04/19/2024  -     Comprehensive Metabolic Panel; Future; Expected date: 04/19/2024  -     Hemoglobin A1C; Future; Expected date: 04/19/2024  -     Lipid Panel; Future; Expected date: 04/19/2024  -     TSH; Future; Expected date: 04/19/2024  -     Vitamin D; Future; Expected date: 04/19/2024  -     HIV 1/2 Ag/Ab (4th Gen); Future; Expected date: 04/19/2024       Get annual labs today to assess overall " health and pregnancy status.  In good health otherwise.

## 2024-04-21 ENCOUNTER — PATIENT MESSAGE (OUTPATIENT)
Dept: FAMILY MEDICINE | Facility: CLINIC | Age: 29
End: 2024-04-21
Payer: COMMERCIAL

## 2024-04-22 RX ORDER — ONDANSETRON 8 MG/1
8 TABLET, ORALLY DISINTEGRATING ORAL EVERY 12 HOURS PRN
Qty: 20 TABLET | Refills: 6 | Status: SHIPPED | OUTPATIENT
Start: 2024-04-22

## 2024-04-22 NOTE — TELEPHONE ENCOUNTER
No care due was identified.  [facility] Embedded Care Due Messages. Reference number: [id number].   4/22/2024 7:50:40 AM CDT

## 2024-05-20 ENCOUNTER — PATIENT MESSAGE (OUTPATIENT)
Dept: OBSTETRICS AND GYNECOLOGY | Facility: CLINIC | Age: 29
End: 2024-05-20
Payer: COMMERCIAL

## 2024-06-08 RX ORDER — DULOXETIN HYDROCHLORIDE 20 MG/1
20 CAPSULE, DELAYED RELEASE ORAL
Qty: 90 CAPSULE | Refills: 3 | Status: SHIPPED | OUTPATIENT
Start: 2024-06-08

## 2024-06-09 NOTE — TELEPHONE ENCOUNTER
Refill Decision Note   Maureen Holly  is requesting a refill authorization.  Brief Assessment and Rationale for Refill:  Approve     Medication Therapy Plan:        Comments:     Note composed:7:55 PM 06/08/2024

## 2024-06-09 NOTE — TELEPHONE ENCOUNTER
No care due was identified.  [facility] Embedded Care Due Messages. Reference number: [id number].   6/08/2024 7:26:33 PM CDT

## 2024-07-12 ENCOUNTER — PATIENT MESSAGE (OUTPATIENT)
Dept: OBSTETRICS AND GYNECOLOGY | Facility: CLINIC | Age: 29
End: 2024-07-12
Payer: COMMERCIAL

## 2024-07-22 PROBLEM — Z00.00 ANNUAL PHYSICAL EXAM: Status: RESOLVED | Noted: 2024-04-19 | Resolved: 2024-07-22

## 2024-07-30 ENCOUNTER — PATIENT MESSAGE (OUTPATIENT)
Dept: FAMILY MEDICINE | Facility: CLINIC | Age: 29
End: 2024-07-30
Payer: COMMERCIAL

## 2024-07-31 NOTE — TELEPHONE ENCOUNTER
Alex Johnson,  Please review this message below from this patient. Has questions concerning Weight loss injectables.  I have instructed patient to contact insurance company to see which medications for weight loss is on her formulary.   This patient has question will she be able to take injections if she is trying to get pregnant?  Last office visit: 4/19/2024  Upcoming Appointment: 10/21/2024  Please review and advise.  Thank you.  Signed:  Tree Clarke LPN

## 2024-08-20 ENCOUNTER — PATIENT MESSAGE (OUTPATIENT)
Dept: OBSTETRICS AND GYNECOLOGY | Facility: CLINIC | Age: 29
End: 2024-08-20
Payer: COMMERCIAL

## 2024-09-03 ENCOUNTER — PATIENT MESSAGE (OUTPATIENT)
Dept: OBSTETRICS AND GYNECOLOGY | Facility: CLINIC | Age: 29
End: 2024-09-03
Payer: COMMERCIAL

## 2024-09-05 ENCOUNTER — PATIENT MESSAGE (OUTPATIENT)
Dept: FAMILY MEDICINE | Facility: CLINIC | Age: 29
End: 2024-09-05
Payer: COMMERCIAL

## 2024-09-10 ENCOUNTER — TELEPHONE (OUTPATIENT)
Dept: OBSTETRICS AND GYNECOLOGY | Facility: CLINIC | Age: 29
End: 2024-09-10
Payer: COMMERCIAL

## 2024-09-10 ENCOUNTER — PATIENT MESSAGE (OUTPATIENT)
Dept: OBSTETRICS AND GYNECOLOGY | Facility: CLINIC | Age: 29
End: 2024-09-10
Payer: COMMERCIAL

## 2024-09-10 NOTE — TELEPHONE ENCOUNTER
----- Message from Nic Bah sent at 9/10/2024  3:05 PM CDT -----  Contact: Self  Type:  Sooner Appointment Request    Caller is requesting a sooner appointment.  Caller declined first available appointment listed below.  Caller will not accept being placed on the waitlist and is requesting a message be sent to doctor.    Name of Caller:  Patient    Would the patient rather a call back or a response via [facility]? Call Back  Best Call Back Number:  [phone]  Additional Information:  Patient is requesting a call back, to see if her appt tomorrow could be moved to around 8am

## 2024-09-18 ENCOUNTER — OFFICE VISIT (OUTPATIENT)
Dept: OBSTETRICS AND GYNECOLOGY | Facility: CLINIC | Age: 29
End: 2024-09-18
Payer: COMMERCIAL

## 2024-09-18 ENCOUNTER — PATIENT MESSAGE (OUTPATIENT)
Dept: OBSTETRICS AND GYNECOLOGY | Facility: CLINIC | Age: 29
End: 2024-09-18

## 2024-09-18 VITALS
DIASTOLIC BLOOD PRESSURE: 72 MMHG | BODY MASS INDEX: 41.64 KG/M2 | SYSTOLIC BLOOD PRESSURE: 128 MMHG | HEIGHT: 63 IN | WEIGHT: 235 LBS

## 2024-09-18 DIAGNOSIS — Z32.00 POSSIBLE PREGNANCY: Primary | ICD-10-CM

## 2024-09-18 LAB
B-HCG UR QL: POSITIVE
CTP QC/QA: YES

## 2024-09-18 PROCEDURE — 87086 URINE CULTURE/COLONY COUNT: CPT | Performed by: OBSTETRICS & GYNECOLOGY

## 2024-09-18 PROCEDURE — 87491 CHLMYD TRACH DNA AMP PROBE: CPT | Performed by: OBSTETRICS & GYNECOLOGY

## 2024-09-18 PROCEDURE — 99999 PR PBB SHADOW E&M-EST. PATIENT-LVL III: CPT | Mod: PBBFAC,,, | Performed by: OBSTETRICS & GYNECOLOGY

## 2024-09-18 NOTE — PROGRESS NOTES
History of Present Illness   29 y.o.  female patient presents today for missed menses, nause   15 minutes spent with patient with > 1/2 time in counseling.  If basing this visit on time, please document that in your note.    Past medical and surgical history reviewed.   I have reviewed the patient's medical history in detail and updated the computerized patient record.      Please let me know if you have any questions.    Review of patient's allergies indicates:   Allergen Reactions    Chloraprep clear [chlorhexidin-isopropyl alcohol]     Reglan [metoclopramide]          Past Medical History:   Diagnosis Date    Anxiety     Eczema     Fracture of left foot 2010    GERD (gastroesophageal reflux disease)     Headache(784.0)     Lactose intolerance     Polyp of nasal cavity     PONV (postoperative nausea and vomiting)     Recurrent upper respiratory infection (URI)     Sprain of right ankle 10/26/11    Varicella     2nd grade       Past Surgical History:   Procedure Laterality Date    ARTHROSCOPY OF ANKLE WITH DEBRIDEMENT Left 07/11/2018    Procedure: ARTHROSCOPY, ANKLE, WITH DEBRIDEMENT;  Surgeon: Jimmie Red MD;  Location: [facility] OR;  Service: Orthopedics;  Laterality: Left;    CHOLECYSTECTOMY  July 8, 2018    ENDOSCOPIC CARPAL TUNNEL RELEASE Right 06/23/2023    FRACTURE SURGERY  July 11,2019    LAPAROSCOPIC CHOLECYSTECTOMY N/A 07/10/2019    Procedure: CHOLECYSTECTOMY, LAPAROSCOPIC;  Surgeon: Carlos Alberto Baker MD;  Location: [facility] OR;  Service: General;  Laterality: N/A;    PLANTAR'S WART EXCISION  01/01/2015    POLYPECTOMY      Nasal    SPHENOIDECTOMY         MEDS:   Current Outpatient Medications on File Prior to Visit   Medication Sig Dispense Refill    DULoxetine (CYMBALTA) 20 MG capsule TAKE 1 CAPSULE(20 MG) BY MOUTH EVERY DAY 90 capsule 3    melatonin (MELATIN) 3 mg tablet       ondansetron (ZOFRAN-ODT) 8 MG TbDL Take 1 tablet (8 mg total) by mouth every 12 (twelve) hours as needed (nausea). 20  tablet 6    prenatal no115/iron/folic acid (PRENATAL 19 ORAL) Take by mouth.      albuterol (VENTOLIN HFA) 90 mcg/actuation inhaler Inhale 2 puffs into the lungs every 4 (four) hours as needed for Wheezing. Rescue (Patient not taking: Reported on 2024) 18 g 3    hydrOXYzine HCL (ATARAX) 25 MG tablet Take 1 tablet (25 mg total) by mouth nightly as needed for Itching. (Patient not taking: Reported on 2024) 30 tablet 6     No current facility-administered medications on file prior to visit.       OB History          1    Para   0    Term   0       0    AB   0    Living   0         SAB   0    IAB   0    Ectopic   0    Multiple   0    Live Births   0                 Social History     Socioeconomic History    Marital status:    Occupational History    Occupation: vet tech   Tobacco Use    Smoking status: Never     Passive exposure: Yes    Smokeless tobacco: Never    Tobacco comments:     Vapes CBD oil. Not as frequent. SL PRN per pt   Substance and Sexual Activity    Alcohol use: Not Currently     Comment: once a month    Drug use: Never    Sexual activity: Yes     Partners: Male     Comment: Oral birth cintrol   Social History Narrative    Born in [place].  Moved from [place] in .       Family History   Problem Relation Name Age of Onset    Allergies Father Terry     Eczema Father Terry     Immunodeficiency Father Terry     Heart disease Father Terry     Hypertension Father Terry     Kidney disease Father Terry     Vision loss Father Terry     Breast cancer Mother Sade     Arthritis Mother Sade     Cancer Mother Sade         Breast    Miscarriages / Stillbirths Mother Sade     Vision loss Mother Sade     Allergies Paternal Aunt      Eczema Paternal Aunt      Allergies Paternal Uncle      Eczema Paternal Uncle      Immunodeficiency Maternal Grandmother Amy     Breast cancer Maternal Grandmother Amy     Ovarian cancer Maternal Grandmother Amy     Asthma Maternal Grandmother Amy     Cancer  "Maternal Grandmother Amy         Lung, breast    Allergies Paternal Grandmother Daphney     Angioedema Paternal Grandmother Daphney     Eczema Paternal Grandmother Daphney     Cancer Paternal Grandmother Daphney         Breast    Allergies Paternal Grandfather Adryan Ahmadier     Eczema Paternal Grandfather Adryan Ahmadier     Depression Paternal Grandfather Adryan Barrera     Hearing loss Paternal Grandfather Adryan Barrera     Hearing loss Paternal Aunt Ketty Stratton     Allergic rhinitis Neg Hx      Atopy Neg Hx      Rhinitis Neg Hx      Urticaria Neg Hx           Review of Systems -   GEN ROS: negative for - chills or fever  Breast ROS: negative for breast lumps  Genito-Urinary ROS: no dysuria, trouble voiding, or hematuria     Urine pregnancy test in office: POSITIVE    Physical Examination:  /72   Ht 5' 3" (1.6 m)   Wt 106.6 kg (235 lb 0.2 oz)   BMI 41.63 kg/m²    deferred      Assessment:  Early IUP  1. Possible pregnancy  POCT Urine Pregnancy    C. trachomatis/N. gonorrhoeae by AMP [facility]; Urine    Urine culture          Plan:  Bleeding/pain precautions   ultrasound w MD todayPrenatal vitamins  Zofran as needed.   requesting or summarizing old records (information regarding previous ob history)  Patient informed will be contacted with results within 2 weeks. Encouraged to please call back or email if she has not heard from us by then.        "

## 2024-09-18 NOTE — PROCEDURES
Procedures    ULTRASOUND:   Bedside Ultrasound Findings    EXAMINATION:  US PELVIS COMP WITH TRANSVAG OB    CLINICAL HISTORY:  8w6d gestation    TECHNIQUE:  Transvaginal sonography was performed to better evaluate the uterus and ovaries.    COMPARISON:  None.    FINDINGS:  1. Uterus:    Appearance: Viable armijo intrauterine pregnancy was seen, yolk sac was seen. Crown-rump length = 22mm with flicker, consistent with 8w6d and EDC 4/24/2025.    Size: Normal    Masses: None    Endometrium: Normal    2. Right ovary: Not seen    3. Left ovary: Not seen    Free Fluid:none    Adnexal pathology: None seen        Impression      1. Single viable intrauterine pregnancy   2. Crown rump length consistent with 8w6d, and estimated due date4/24/2025

## 2024-09-19 ENCOUNTER — PATIENT MESSAGE (OUTPATIENT)
Dept: FAMILY MEDICINE | Facility: CLINIC | Age: 29
End: 2024-09-19
Payer: COMMERCIAL

## 2024-09-19 LAB
BACTERIA UR CULT: NORMAL
BACTERIA UR CULT: NORMAL

## 2024-09-20 ENCOUNTER — TELEPHONE (OUTPATIENT)
Dept: OBSTETRICS AND GYNECOLOGY | Facility: CLINIC | Age: 29
End: 2024-09-20
Payer: COMMERCIAL

## 2024-09-20 LAB
C TRACH DNA SPEC QL NAA+PROBE: NOT DETECTED
N GONORRHOEA DNA SPEC QL NAA+PROBE: NOT DETECTED

## 2024-09-20 NOTE — TELEPHONE ENCOUNTER
----- Message from Raheem Calle MD sent at 9/20/2024  8:14 AM CDT -----  Regarding: urine  Urine negative for infection, contaminate with skin jessica, if symptoms needed repeat culture

## 2024-09-23 ENCOUNTER — PATIENT MESSAGE (OUTPATIENT)
Dept: OBSTETRICS AND GYNECOLOGY | Facility: CLINIC | Age: 29
End: 2024-09-23
Payer: COMMERCIAL

## 2024-10-02 ENCOUNTER — ROUTINE PRENATAL (OUTPATIENT)
Dept: OBSTETRICS AND GYNECOLOGY | Facility: CLINIC | Age: 29
End: 2024-10-02
Payer: COMMERCIAL

## 2024-10-02 ENCOUNTER — LAB VISIT (OUTPATIENT)
Dept: LAB | Facility: HOSPITAL | Age: 29
End: 2024-10-02
Attending: OBSTETRICS & GYNECOLOGY
Payer: COMMERCIAL

## 2024-10-02 VITALS — SYSTOLIC BLOOD PRESSURE: 126 MMHG | DIASTOLIC BLOOD PRESSURE: 62 MMHG

## 2024-10-02 DIAGNOSIS — Z3A.10 10 WEEKS GESTATION OF PREGNANCY: ICD-10-CM

## 2024-10-02 DIAGNOSIS — Z3A.10 10 WEEKS GESTATION OF PREGNANCY: Primary | ICD-10-CM

## 2024-10-02 LAB
ABO + RH BLD: NORMAL
ANION GAP SERPL CALC-SCNC: 9 MMOL/L (ref 8–16)
BASOPHILS # BLD AUTO: 0.05 K/UL (ref 0–0.2)
BASOPHILS NFR BLD: 0.4 % (ref 0–1.9)
BLD GP AB SCN CELLS X3 SERPL QL: NORMAL
BUN SERPL-MCNC: 11 MG/DL (ref 6–20)
CALCIUM SERPL-MCNC: 8.9 MG/DL (ref 8.7–10.5)
CHLORIDE SERPL-SCNC: 105 MMOL/L (ref 95–110)
CO2 SERPL-SCNC: 23 MMOL/L (ref 23–29)
CREAT SERPL-MCNC: 0.8 MG/DL (ref 0.5–1.4)
DIFFERENTIAL METHOD BLD: ABNORMAL
EOSINOPHIL # BLD AUTO: 0.1 K/UL (ref 0–0.5)
EOSINOPHIL NFR BLD: 1.1 % (ref 0–8)
ERYTHROCYTE [DISTWIDTH] IN BLOOD BY AUTOMATED COUNT: 14.6 % (ref 11.5–14.5)
EST. GFR  (NO RACE VARIABLE): >60 ML/MIN/1.73 M^2
GLUCOSE SERPL-MCNC: 95 MG/DL (ref 70–110)
HBV SURFACE AG SERPL QL IA: NORMAL
HCT VFR BLD AUTO: 36.5 % (ref 37–48.5)
HCV AB SERPL QL IA: NORMAL
HGB BLD-MCNC: 11.6 G/DL (ref 12–16)
HIV 1+2 AB+HIV1 P24 AG SERPL QL IA: NORMAL
IMM GRANULOCYTES # BLD AUTO: 0.03 K/UL (ref 0–0.04)
IMM GRANULOCYTES NFR BLD AUTO: 0.2 % (ref 0–0.5)
LYMPHOCYTES # BLD AUTO: 3.7 K/UL (ref 1–4.8)
LYMPHOCYTES NFR BLD: 30.6 % (ref 18–48)
MCH RBC QN AUTO: 24.6 PG (ref 27–31)
MCHC RBC AUTO-ENTMCNC: 31.8 G/DL (ref 32–36)
MCV RBC AUTO: 77 FL (ref 82–98)
MISCELLANEOUS GENETIC TEST NAME: NORMAL
MONOCYTES # BLD AUTO: 0.6 K/UL (ref 0.3–1)
MONOCYTES NFR BLD: 4.8 % (ref 4–15)
NEUTROPHILS # BLD AUTO: 7.6 K/UL (ref 1.8–7.7)
NEUTROPHILS NFR BLD: 62.9 % (ref 38–73)
NRBC BLD-RTO: 0 /100 WBC
PLATELET # BLD AUTO: 436 K/UL (ref 150–450)
PMV BLD AUTO: 9.7 FL (ref 9.2–12.9)
POTASSIUM SERPL-SCNC: 3.4 MMOL/L (ref 3.5–5.1)
RBC # BLD AUTO: 4.72 M/UL (ref 4–5.4)
SODIUM SERPL-SCNC: 137 MMOL/L (ref 136–145)
SPECIMEN OUTDATE: NORMAL
T4 FREE SERPL-MCNC: 0.88 NG/DL (ref 0.71–1.51)
TREPONEMA PALLIDUM IGG+IGM AB [PRESENCE] IN SERUM OR PLASMA BY IMMUNOASSAY: NONREACTIVE
WBC # BLD AUTO: 12.13 K/UL (ref 3.9–12.7)

## 2024-10-02 PROCEDURE — 87340 HEPATITIS B SURFACE AG IA: CPT | Performed by: OBSTETRICS & GYNECOLOGY

## 2024-10-02 PROCEDURE — 86803 HEPATITIS C AB TEST: CPT | Performed by: OBSTETRICS & GYNECOLOGY

## 2024-10-02 PROCEDURE — 86762 RUBELLA ANTIBODY: CPT | Performed by: OBSTETRICS & GYNECOLOGY

## 2024-10-02 PROCEDURE — 84439 ASSAY OF FREE THYROXINE: CPT | Performed by: OBSTETRICS & GYNECOLOGY

## 2024-10-02 PROCEDURE — 85025 COMPLETE CBC W/AUTO DIFF WBC: CPT | Performed by: OBSTETRICS & GYNECOLOGY

## 2024-10-02 PROCEDURE — 99999 PR PBB SHADOW E&M-EST. PATIENT-LVL II: CPT | Mod: PBBFAC,,, | Performed by: OBSTETRICS & GYNECOLOGY

## 2024-10-02 PROCEDURE — 87389 HIV-1 AG W/HIV-1&-2 AB AG IA: CPT | Performed by: OBSTETRICS & GYNECOLOGY

## 2024-10-02 PROCEDURE — 0502F SUBSEQUENT PRENATAL CARE: CPT | Mod: CPTII,S$GLB,, | Performed by: OBSTETRICS & GYNECOLOGY

## 2024-10-02 PROCEDURE — 86900 BLOOD TYPING SEROLOGIC ABO: CPT | Performed by: OBSTETRICS & GYNECOLOGY

## 2024-10-02 PROCEDURE — 86901 BLOOD TYPING SEROLOGIC RH(D): CPT | Performed by: OBSTETRICS & GYNECOLOGY

## 2024-10-02 PROCEDURE — 80048 BASIC METABOLIC PNL TOTAL CA: CPT | Performed by: OBSTETRICS & GYNECOLOGY

## 2024-10-02 PROCEDURE — 36415 COLL VENOUS BLD VENIPUNCTURE: CPT | Mod: PO | Performed by: OBSTETRICS & GYNECOLOGY

## 2024-10-02 PROCEDURE — 86593 SYPHILIS TEST NON-TREP QUANT: CPT | Performed by: OBSTETRICS & GYNECOLOGY

## 2024-10-02 PROCEDURE — 86850 RBC ANTIBODY SCREEN: CPT | Performed by: OBSTETRICS & GYNECOLOGY

## 2024-10-02 NOTE — PROGRESS NOTES
The patient presents with complaints of nausea.   Reports: No Bleeding or pains    10/2/2024  29 y.o. 10w6d Estimated Date of Delivery: 25, dating reviewed.   OB History    Para Term  AB Living   1 0 0 0 0 0   SAB IAB Ectopic Multiple Live Births   0 0 0 0 0      # Outcome Date GA Lbr Luis Fernando/2nd Weight Sex Type Anes PTL Lv   1 Current                Prenatal labs reviewed and updated today    Review of Systems:  General ROS: negative for headache or visual changes  Breast ROS: negative for breast lumps  Gastrointestinal ROS: negative for  constipation, diarrhea or nausea/vomiting  Musculoskeletal ROS: negative for pain in joints or swelling in face or hands.   Neurological ROS: negative for - headaches, numbness/tingling or visual changes      Physical Exam:  /62   Urine Dip: Pending  Fundal Height:10cm  Fetal Heart Tones: 184bpm  Constitutional: She is oriented to person, place, and time. She appears well-developed and well-nourished. No distress. Overweight   Cardiovascular: Normal rate.    Pulmonary/Chest: Effort normal. No respiratory distress  Abdominal: Soft, gravid, nontender. No rebound and no guarding.     Genitourinary: Deferred     Musculoskeletal: Normal range of motion, Minimal peripheral edema.   Neurological: She is alert and oriented to person, place, and time. Coordination normal.   Skin: Skin is warm and dry. She is not diaphoretic.  Psychiatric: She has a normal mood and affect.        Assessment:  29 y.o., at 10w6d Gestation   Patient Active Problem List   Diagnosis    Lactose intolerance    Obesity    R sphenoid nasal polyp    Anxiety    GERD (gastroesophageal reflux disease)    Hypertrophy, nasal, turbinate    Dysmenorrhea    Ankle instability, left    Adjustment disorder with anxious mood    COVID     Current Outpatient Medications on File Prior to Visit   Medication Sig Dispense Refill    DULoxetine (CYMBALTA) 20 MG capsule TAKE 1 CAPSULE(20 MG) BY MOUTH EVERY DAY 90  capsule 3    melatonin (MELATIN) 3 mg tablet       ondansetron (ZOFRAN-ODT) 8 MG TbDL Take 1 tablet (8 mg total) by mouth every 12 (twelve) hours as needed (nausea). 20 tablet 6    prenatal no115/iron/folic acid (PRENATAL 19 ORAL) Take by mouth.      albuterol (VENTOLIN HFA) 90 mcg/actuation inhaler Inhale 2 puffs into the lungs every 4 (four) hours as needed for Wheezing. Rescue (Patient not taking: Reported on 10/2/2024) 18 g 3    hydrOXYzine HCL (ATARAX) 25 MG tablet Take 1 tablet (25 mg total) by mouth nightly as needed for Itching. (Patient not taking: Reported on 10/2/2024) 30 tablet 6     No current facility-administered medications on file prior to visit.         Plan:   Labs today:pnl, unity  Orders today: none  MEds today: none  Procedures Today: u/s for FHT  Follow up 3Weeks, bleeding/pain precautions

## 2024-10-03 ENCOUNTER — PATIENT MESSAGE (OUTPATIENT)
Dept: OBSTETRICS AND GYNECOLOGY | Facility: CLINIC | Age: 29
End: 2024-10-03
Payer: COMMERCIAL

## 2024-10-03 LAB
RUBV IGG SER-ACNC: 27.6 IU/ML
RUBV IGG SER-IMP: REACTIVE

## 2024-10-17 ENCOUNTER — PATIENT MESSAGE (OUTPATIENT)
Dept: OBSTETRICS AND GYNECOLOGY | Facility: CLINIC | Age: 29
End: 2024-10-17
Payer: COMMERCIAL

## 2024-10-18 ENCOUNTER — ROUTINE PRENATAL (OUTPATIENT)
Dept: OBSTETRICS AND GYNECOLOGY | Facility: CLINIC | Age: 29
End: 2024-10-18
Payer: COMMERCIAL

## 2024-10-18 VITALS
SYSTOLIC BLOOD PRESSURE: 116 MMHG | BODY MASS INDEX: 40.89 KG/M2 | WEIGHT: 230.81 LBS | DIASTOLIC BLOOD PRESSURE: 82 MMHG

## 2024-10-18 DIAGNOSIS — Z3A.13 13 WEEKS GESTATION OF PREGNANCY: Primary | ICD-10-CM

## 2024-10-18 LAB
BILIRUBIN, UA POC OHS: NEGATIVE
BLOOD, UA POC OHS: NEGATIVE
CLARITY, UA POC OHS: CLEAR
COLOR, UA POC OHS: YELLOW
GLUCOSE, UA POC OHS: NEGATIVE
KETONES, UA POC OHS: 15
LEUKOCYTES, UA POC OHS: NEGATIVE
NITRITE, UA POC OHS: NEGATIVE
PH, UA POC OHS: 7
PROTEIN, UA POC OHS: NEGATIVE
SPECIFIC GRAVITY, UA POC OHS: 1.02
UROBILINOGEN, UA POC OHS: 0.2

## 2024-10-18 PROCEDURE — 99999 PR PBB SHADOW E&M-EST. PATIENT-LVL III: CPT | Mod: PBBFAC,,, | Performed by: OBSTETRICS & GYNECOLOGY

## 2024-10-18 RX ORDER — DIPHENHYDRAMINE HCL 25 MG
25 CAPSULE ORAL EVERY 6 HOURS PRN
COMMUNITY

## 2024-10-18 NOTE — PROGRESS NOTES
The patient presents with pubic strain after moving 65#dog, no Vaginal Bleeding    Reports: No Bleeding or pains    10/18/2024  29 y.o. 13w1d Estimated Date of Delivery: 25, dating reviewed.   OB History    Para Term  AB Living   1 0 0 0 0 0   SAB IAB Ectopic Multiple Live Births   0 0 0 0 0      # Outcome Date GA Lbr Luis Fernando/2nd Weight Sex Type Anes PTL Lv   1 Current                Prenatal labs reviewed and updated today    Review of Systems:  General ROS: negative for headache or visual changes  Breast ROS: negative for breast lumps  Gastrointestinal ROS: negative for  constipation, diarrhea or nausea/vomiting  Musculoskeletal ROS: negative for pain in joints or swelling in face or hands.   Neurological ROS: negative for - headaches, numbness/tingling or visual changes      Physical Exam:  /82   Wt 104.7 kg (230 lb 13.2 oz)   BMI 40.89 kg/m²   Urine Dip: Pending  Fundal Height: cm  Fetal Heart Tones: 176bpm  Constitutional: She is oriented to person, place, and time. She appears well-developed and well-nourished. No distress. Normal weight   Cardiovascular: Normal rate.    Pulmonary/Chest: Effort normal. No respiratory distress  Abdominal: Soft, gravid, nontender. No rebound and no guarding.     Genitourinary: Deferred     Musculoskeletal: Normal range of motion, Minimal peripheral edema.   Neurological: She is alert and oriented to person, place, and time. Coordination normal.   Skin: Skin is warm and dry. She is not diaphoretic.  Psychiatric: She has a normal mood and affect.        Assessment:  29 y.o., at 13w1d Gestation   Patient Active Problem List   Diagnosis    Lactose intolerance    Obesity    R sphenoid nasal polyp    Anxiety    GERD (gastroesophageal reflux disease)    Hypertrophy, nasal, turbinate    Dysmenorrhea    Ankle instability, left    Adjustment disorder with anxious mood    COVID     Current Outpatient Medications on File Prior to Visit   Medication Sig Dispense  Refill    diphenhydrAMINE (BENADRYL) 25 mg capsule Take 25 mg by mouth every 6 (six) hours as needed for Itching.      DULoxetine (CYMBALTA) 20 MG capsule TAKE 1 CAPSULE(20 MG) BY MOUTH EVERY DAY 90 capsule 3    melatonin (MELATIN) 3 mg tablet       ondansetron (ZOFRAN-ODT) 8 MG TbDL Take 1 tablet (8 mg total) by mouth every 12 (twelve) hours as needed (nausea). 20 tablet 6    prenatal no115/iron/folic acid (PRENATAL 19 ORAL) Take by mouth.      albuterol (VENTOLIN HFA) 90 mcg/actuation inhaler Inhale 2 puffs into the lungs every 4 (four) hours as needed for Wheezing. Rescue (Patient not taking: Reported on 9/18/2024) 18 g 3    hydrOXYzine HCL (ATARAX) 25 MG tablet Take 1 tablet (25 mg total) by mouth nightly as needed for Itching. (Patient not taking: Reported on 9/18/2024) 30 tablet 6     No current facility-administered medications on file prior to visit.       Plan:   Labs today: none  Orders today: none  MEds today: none  Procedures Today: none  Follow up 1 Weeks, bleeding/pain precautions

## 2024-10-23 ENCOUNTER — ROUTINE PRENATAL (OUTPATIENT)
Dept: OBSTETRICS AND GYNECOLOGY | Facility: CLINIC | Age: 29
End: 2024-10-23
Payer: COMMERCIAL

## 2024-10-23 VITALS
WEIGHT: 233.44 LBS | SYSTOLIC BLOOD PRESSURE: 116 MMHG | BODY MASS INDEX: 41.36 KG/M2 | DIASTOLIC BLOOD PRESSURE: 84 MMHG

## 2024-10-23 DIAGNOSIS — Z3A.13 13 WEEKS GESTATION OF PREGNANCY: Primary | ICD-10-CM

## 2024-10-23 LAB
BILIRUBIN, UA POC OHS: NEGATIVE
BLOOD, UA POC OHS: NEGATIVE
CLARITY, UA POC OHS: ABNORMAL
COLOR, UA POC OHS: YELLOW
GLUCOSE, UA POC OHS: NEGATIVE
KETONES, UA POC OHS: NEGATIVE
LEUKOCYTES, UA POC OHS: NEGATIVE
NITRITE, UA POC OHS: NEGATIVE
PH, UA POC OHS: 5.5
PROTEIN, UA POC OHS: NEGATIVE
SPECIFIC GRAVITY, UA POC OHS: >=1.03
UROBILINOGEN, UA POC OHS: 0.2

## 2024-10-23 PROCEDURE — 99999 PR PBB SHADOW E&M-EST. PATIENT-LVL III: CPT | Mod: PBBFAC,,, | Performed by: OBSTETRICS & GYNECOLOGY

## 2024-10-23 PROCEDURE — 0502F SUBSEQUENT PRENATAL CARE: CPT | Mod: CPTII,S$GLB,, | Performed by: OBSTETRICS & GYNECOLOGY

## 2024-10-23 PROCEDURE — 87086 URINE CULTURE/COLONY COUNT: CPT | Performed by: OBSTETRICS & GYNECOLOGY

## 2024-10-23 NOTE — PROGRESS NOTES
The patient presents with No complaints.   Reports: Good fetal movements reported, No Bleeding or pains    10/23/2024  29 y.o. 13w6d Estimated Date of Delivery: 25, dating reviewed.   OB History    Para Term  AB Living   1 0 0 0 0 0   SAB IAB Ectopic Multiple Live Births   0 0 0 0 0      # Outcome Date GA Lbr Luis Fernando/2nd Weight Sex Type Anes PTL Lv   1 Current                Prenatal labs reviewed and updated today    Review of Systems:  General ROS: negative for headache or visual changes  Breast ROS: negative for breast lumps  Gastrointestinal ROS: negative for constipation, diarrhea or nausea/vomiting  Musculoskeletal ROS: negative for pain in joints or swelling in face or hands.   Neurological ROS: negative for - headaches, numbness/tingling or visual changes      Physical Exam:  /84   Wt 105.9 kg (233 lb 7.5 oz)   BMI 41.36 kg/m²   Urine Dip: Pending  Fundal Height:14cm  Fetal Heart Tones: 164bpm  Constitutional: She is oriented to person, place, and time. She appears well-developed and well-nourished. No distress. Normal weight   Cardiovascular: Normal rate.    Pulmonary/Chest: Effort normal. No respiratory distress  Abdominal: Soft, gravid, nontender. No rebound and no guarding.     Genitourinary: Deferred    Musculoskeletal: Normal range of motion, Minimal peripheral edema.   Neurological: She is alert and oriented to person, place, and time. Coordination normal.   Skin: Skin is warm and dry. She is not diaphoretic.  Psychiatric: She has a normal mood and affect.        Assessment:  29 y.o., at 13w6d Gestation   Patient Active Problem List   Diagnosis    Lactose intolerance    Obesity    R sphenoid nasal polyp    Anxiety    GERD (gastroesophageal reflux disease)    Hypertrophy, nasal, turbinate    Dysmenorrhea    Ankle instability, left    Adjustment disorder with anxious mood    COVID     Current Outpatient Medications on File Prior to Visit   Medication Sig Dispense Refill     DULoxetine (CYMBALTA) 20 MG capsule TAKE 1 CAPSULE(20 MG) BY MOUTH EVERY DAY 90 capsule 3    melatonin (MELATIN) 3 mg tablet       ondansetron (ZOFRAN-ODT) 8 MG TbDL Take 1 tablet (8 mg total) by mouth every 12 (twelve) hours as needed (nausea). 20 tablet 6    prenatal no115/iron/folic acid (PRENATAL 19 ORAL) Take by mouth.      albuterol (VENTOLIN HFA) 90 mcg/actuation inhaler Inhale 2 puffs into the lungs every 4 (four) hours as needed for Wheezing. Rescue (Patient not taking: Reported on 9/18/2024) 18 g 3    diphenhydrAMINE (BENADRYL) 25 mg capsule Take 25 mg by mouth every 6 (six) hours as needed for Itching. (Patient not taking: Reported on 10/23/2024)      hydrOXYzine HCL (ATARAX) 25 MG tablet Take 1 tablet (25 mg total) by mouth nightly as needed for Itching. (Patient not taking: Reported on 10/23/2024) 30 tablet 6     No current facility-administered medications on file prior to visit.         Plan:   Labs today: none  Orders today: none  MEds today: none  Procedures Today: none  Follow up 3-4 Weeks, bleeding/pain precautions ,  kick counts, labor precautions

## 2024-10-25 LAB
BACTERIA UR CULT: NORMAL
BACTERIA UR CULT: NORMAL

## 2024-11-07 ENCOUNTER — PATIENT MESSAGE (OUTPATIENT)
Dept: OTHER | Facility: OTHER | Age: 29
End: 2024-11-07
Payer: COMMERCIAL

## 2024-11-12 ENCOUNTER — PATIENT MESSAGE (OUTPATIENT)
Dept: OBSTETRICS AND GYNECOLOGY | Facility: CLINIC | Age: 29
End: 2024-11-12
Payer: COMMERCIAL

## 2024-11-13 ENCOUNTER — ROUTINE PRENATAL (OUTPATIENT)
Dept: OBSTETRICS AND GYNECOLOGY | Facility: CLINIC | Age: 29
End: 2024-11-13
Payer: COMMERCIAL

## 2024-11-13 ENCOUNTER — PATIENT MESSAGE (OUTPATIENT)
Dept: FAMILY MEDICINE | Facility: CLINIC | Age: 29
End: 2024-11-13
Payer: COMMERCIAL

## 2024-11-13 VITALS
DIASTOLIC BLOOD PRESSURE: 78 MMHG | WEIGHT: 233.44 LBS | SYSTOLIC BLOOD PRESSURE: 122 MMHG | BODY MASS INDEX: 41.36 KG/M2

## 2024-11-13 DIAGNOSIS — Z3A.16 16 WEEKS GESTATION OF PREGNANCY: Primary | ICD-10-CM

## 2024-11-13 LAB
BILIRUBIN, UA POC OHS: NEGATIVE
BLOOD, UA POC OHS: NEGATIVE
CLARITY, UA POC OHS: CLEAR
COLOR, UA POC OHS: YELLOW
GLUCOSE, UA POC OHS: NEGATIVE
KETONES, UA POC OHS: ABNORMAL
LEUKOCYTES, UA POC OHS: NEGATIVE
NITRITE, UA POC OHS: NEGATIVE
PH, UA POC OHS: 6
PROTEIN, UA POC OHS: NEGATIVE
SPECIFIC GRAVITY, UA POC OHS: >=1.03
UROBILINOGEN, UA POC OHS: 1

## 2024-11-13 PROCEDURE — 99999 PR PBB SHADOW E&M-EST. PATIENT-LVL III: CPT | Mod: PBBFAC,,, | Performed by: OBSTETRICS & GYNECOLOGY

## 2024-11-13 NOTE — PROGRESS NOTES
The patient presents with No complaints.   Reports: No Bleeding or pains    2024  29 y.o. 16w6d Estimated Date of Delivery: 25, dating reviewed.   OB History    Para Term  AB Living   1 0 0 0 0 0   SAB IAB Ectopic Multiple Live Births   0 0 0 0 0      # Outcome Date GA Lbr Luis Fernando/2nd Weight Sex Type Anes PTL Lv   1 Current                Prenatal labs reviewed and updated today    Review of Systems:  General ROS: negative for headache or visual changes  Breast ROS: negative for breast lumps  Gastrointestinal ROS: negative for  constipation, diarrhea or nausea/vomiting  Musculoskeletal ROS: negative for pain in joints or swelling in face or hands.   Neurological ROS: negative for - headaches, numbness/tingling or visual changes      Physical Exam:  /78   Wt 105.9 kg (233 lb 7.5 oz)   BMI 41.36 kg/m²   Urine Dip: Pending  Fundal Height:18cm  Fetal Heart Tones: 164bpm  Constitutional: She is oriented to person, place, and time. She appears well-developed and well-nourished. No distress. Overweight   Cardiovascular: Normal rate.    Pulmonary/Chest: Effort normal. No respiratory distress  Abdominal: Soft, gravid, nontender. No rebound and no guarding.     Genitourinary: Deferred    Musculoskeletal: Normal range of motion, Minimal peripheral edema.   Neurological: She is alert and oriented to person, place, and time. Coordination normal.   Skin: Skin is warm and dry. She is not diaphoretic.  Psychiatric: She has a normal mood and affect.        Assessment:  29 y.o., at 16w6d Gestation   Patient Active Problem List   Diagnosis    Lactose intolerance    Obesity    R sphenoid nasal polyp    Anxiety    GERD (gastroesophageal reflux disease)    Hypertrophy, nasal, turbinate    Dysmenorrhea    Ankle instability, left    Adjustment disorder with anxious mood    COVID     Current Outpatient Medications on File Prior to Visit   Medication Sig Dispense Refill    doxylamine succinate (UNISOM,  DOXYLAMINE,) 25 mg tablet Take 25 mg by mouth nightly as needed for Insomnia. Taking 1/2 of 25mg      DULoxetine (CYMBALTA) 20 MG capsule TAKE 1 CAPSULE(20 MG) BY MOUTH EVERY DAY 90 capsule 3    melatonin (MELATIN) 3 mg tablet       ondansetron (ZOFRAN-ODT) 8 MG TbDL Take 1 tablet (8 mg total) by mouth every 12 (twelve) hours as needed (nausea). 20 tablet 6    prenatal no115/iron/folic acid (PRENATAL 19 ORAL) Take by mouth.      albuterol (VENTOLIN HFA) 90 mcg/actuation inhaler Inhale 2 puffs into the lungs every 4 (four) hours as needed for Wheezing. Rescue (Patient not taking: Reported on 11/13/2024) 18 g 3    diphenhydrAMINE (BENADRYL) 25 mg capsule Take 25 mg by mouth every 6 (six) hours as needed for Itching. (Patient not taking: Reported on 11/13/2024)      hydrOXYzine HCL (ATARAX) 25 MG tablet Take 1 tablet (25 mg total) by mouth nightly as needed for Itching. (Patient not taking: Reported on 9/18/2024) 30 tablet 6     No current facility-administered medications on file prior to visit.       Plan:   Labs today: none  Orders today: ultrasound 3-4 weeks  MEds today: none  Procedures Today: none  Follow up 3-4 Weeks, bleeding/pain precautions

## 2024-11-14 ENCOUNTER — PATIENT MESSAGE (OUTPATIENT)
Dept: OTHER | Facility: OTHER | Age: 29
End: 2024-11-14
Payer: COMMERCIAL

## 2024-12-05 ENCOUNTER — PATIENT MESSAGE (OUTPATIENT)
Dept: OTHER | Facility: OTHER | Age: 29
End: 2024-12-05
Payer: COMMERCIAL

## 2024-12-11 ENCOUNTER — HOSPITAL ENCOUNTER (OUTPATIENT)
Dept: RADIOLOGY | Facility: HOSPITAL | Age: 29
Discharge: HOME | End: 2024-12-11
Attending: OBSTETRICS & GYNECOLOGY
Payer: COMMERCIAL

## 2024-12-11 ENCOUNTER — ROUTINE PRENATAL (OUTPATIENT)
Dept: OBSTETRICS AND GYNECOLOGY | Facility: CLINIC | Age: 29
End: 2024-12-11
Payer: COMMERCIAL

## 2024-12-11 VITALS
DIASTOLIC BLOOD PRESSURE: 76 MMHG | SYSTOLIC BLOOD PRESSURE: 108 MMHG | WEIGHT: 235.25 LBS | BODY MASS INDEX: 41.67 KG/M2

## 2024-12-11 DIAGNOSIS — Z3A.20 20 WEEKS GESTATION OF PREGNANCY: Primary | ICD-10-CM

## 2024-12-11 DIAGNOSIS — Z3A.16 16 WEEKS GESTATION OF PREGNANCY: ICD-10-CM

## 2024-12-11 LAB
BILIRUBIN, UA POC OHS: NEGATIVE
BLOOD, UA POC OHS: NEGATIVE
CLARITY, UA POC OHS: CLEAR
COLOR, UA POC OHS: YELLOW
GLUCOSE, UA POC OHS: NEGATIVE
KETONES, UA POC OHS: ABNORMAL
LEUKOCYTES, UA POC OHS: NEGATIVE
NITRITE, UA POC OHS: NEGATIVE
PH, UA POC OHS: 5.5
PROTEIN, UA POC OHS: NEGATIVE
SPECIFIC GRAVITY, UA POC OHS: >=1.03
UROBILINOGEN, UA POC OHS: 0.2

## 2024-12-11 PROCEDURE — 99999 PR PBB SHADOW E&M-EST. PATIENT-LVL III: CPT | Mod: PBBFAC,,, | Performed by: OBSTETRICS & GYNECOLOGY

## 2024-12-11 PROCEDURE — 76805 OB US >/= 14 WKS SNGL FETUS: CPT | Mod: TC,PN

## 2024-12-11 NOTE — PROGRESS NOTES
The patient presents with Normal complaints .   Reports: Good fetal movements, No Bleeding or pains    2024  29 y.o. 20w6d Estimated Date of Delivery: 25, dating reviewed.   OB History    Para Term  AB Living   1 0 0 0 0 0   SAB IAB Ectopic Multiple Live Births   0 0 0 0 0      # Outcome Date GA Lbr Luis Fernando/2nd Weight Sex Type Anes PTL Lv   1 Current                Prenatal labs reviewed and updated today    Review of Systems:  General ROS: negative for headache or visual changes  Breast ROS: negative for breast lumps  Gastrointestinal ROS: negative for  constipation, diarrhea or nausea/vomiting  Musculoskeletal ROS: negative for pain in joints or swelling in face or hands.   Neurological ROS: negative for - headaches, numbness/tingling or visual changes      Physical Exam:  Wt 106.7 kg (235 lb 3.7 oz)   BMI 41.67 kg/m²   Urine Dip: Pending  Fundal Height:21cm  Fetal Heart Tones: 161bpm  Constitutional: She is oriented to person, place, and time. She appears well-developed and well-nourished. No distress. Overweight   Cardiovascular: Normal rate.    Pulmonary/Chest: Effort normal. No respiratory distress  Abdominal: Soft, gravid, nontender. No rebound and no guarding.     Genitourinary: Deferred    Musculoskeletal: Normal range of motion, Minimal peripheral edema.   Neurological: She is alert and oriented to person, place, and time. Coordination normal.   Skin: Skin is warm and dry. She is not diaphoretic.  Psychiatric: She has a normal mood and affect.        Assessment:  29 y.o., at 20w6d Gestation   Patient Active Problem List   Diagnosis    Lactose intolerance    Obesity    R sphenoid nasal polyp    Anxiety    GERD (gastroesophageal reflux disease)    Hypertrophy, nasal, turbinate    Dysmenorrhea    Ankle instability, left    Adjustment disorder with anxious mood    COVID     Current Outpatient Medications on File Prior to Visit   Medication Sig Dispense Refill    doxylamine succinate  (UNISOM, DOXYLAMINE,) 25 mg tablet Take 25 mg by mouth nightly as needed for Insomnia. Taking 1/2 of 25mg      DULoxetine (CYMBALTA) 20 MG capsule TAKE 1 CAPSULE(20 MG) BY MOUTH EVERY DAY 90 capsule 3    melatonin (MELATIN) 3 mg tablet       prenatal no115/iron/folic acid (PRENATAL 19 ORAL) Take by mouth.      albuterol (VENTOLIN HFA) 90 mcg/actuation inhaler Inhale 2 puffs into the lungs every 4 (four) hours as needed for Wheezing. Rescue (Patient not taking: Reported on 9/18/2024) 18 g 3    diphenhydrAMINE (BENADRYL) 25 mg capsule Take 25 mg by mouth every 6 (six) hours as needed for Itching. (Patient not taking: Reported on 10/23/2024)      hydrOXYzine HCL (ATARAX) 25 MG tablet Take 1 tablet (25 mg total) by mouth nightly as needed for Itching. (Patient not taking: Reported on 12/11/2024) 30 tablet 6    ondansetron (ZOFRAN-ODT) 8 MG TbDL Take 1 tablet (8 mg total) by mouth every 12 (twelve) hours as needed (nausea). (Patient not taking: Reported on 12/11/2024) 20 tablet 6     No current facility-administered medications on file prior to visit.         Plan:   Labs today: none  Orders today: none  MEds today: none  Procedures Today: none  Follow up 3 Weeks, bleeding/pain precautions ,

## 2024-12-13 ENCOUNTER — PATIENT MESSAGE (OUTPATIENT)
Dept: OBSTETRICS AND GYNECOLOGY | Facility: CLINIC | Age: 29
End: 2024-12-13
Payer: COMMERCIAL

## 2024-12-15 ENCOUNTER — PATIENT MESSAGE (OUTPATIENT)
Dept: OBSTETRICS AND GYNECOLOGY | Facility: CLINIC | Age: 29
End: 2024-12-15
Payer: COMMERCIAL

## 2024-12-16 ENCOUNTER — ROUTINE PRENATAL (OUTPATIENT)
Dept: OBSTETRICS AND GYNECOLOGY | Facility: CLINIC | Age: 29
End: 2024-12-16
Payer: COMMERCIAL

## 2024-12-16 ENCOUNTER — OFFICE VISIT (OUTPATIENT)
Dept: OBSTETRICS AND GYNECOLOGY | Facility: CLINIC | Age: 29
End: 2024-12-16
Payer: COMMERCIAL

## 2024-12-16 VITALS
SYSTOLIC BLOOD PRESSURE: 126 MMHG | DIASTOLIC BLOOD PRESSURE: 86 MMHG | DIASTOLIC BLOOD PRESSURE: 86 MMHG | BODY MASS INDEX: 41.33 KG/M2 | HEIGHT: 63 IN | WEIGHT: 233.25 LBS | SYSTOLIC BLOOD PRESSURE: 126 MMHG | BODY MASS INDEX: 41.32 KG/M2 | WEIGHT: 233.25 LBS

## 2024-12-16 DIAGNOSIS — Z3A.21 21 WEEKS GESTATION OF PREGNANCY: Primary | ICD-10-CM

## 2024-12-16 LAB
BILIRUBIN, UA POC OHS: NEGATIVE
BLOOD, UA POC OHS: NEGATIVE
CLARITY, UA POC OHS: ABNORMAL
COLOR, UA POC OHS: YELLOW
GLUCOSE, UA POC OHS: NEGATIVE
KETONES, UA POC OHS: 15
LEUKOCYTES, UA POC OHS: NEGATIVE
NITRITE, UA POC OHS: NEGATIVE
PH, UA POC OHS: 5.5
PROTEIN, UA POC OHS: NEGATIVE
SPECIFIC GRAVITY, UA POC OHS: >=1.03
UROBILINOGEN, UA POC OHS: 0.2

## 2024-12-16 PROCEDURE — 87086 URINE CULTURE/COLONY COUNT: CPT | Performed by: OBSTETRICS & GYNECOLOGY

## 2024-12-16 PROCEDURE — 1159F MED LIST DOCD IN RCRD: CPT | Mod: CPTII,S$GLB,, | Performed by: OBSTETRICS & GYNECOLOGY

## 2024-12-16 PROCEDURE — 3079F DIAST BP 80-89 MM HG: CPT | Mod: CPTII,S$GLB,, | Performed by: OBSTETRICS & GYNECOLOGY

## 2024-12-16 PROCEDURE — 0502F SUBSEQUENT PRENATAL CARE: CPT | Mod: CPTII,S$GLB,, | Performed by: OBSTETRICS & GYNECOLOGY

## 2024-12-16 PROCEDURE — 3074F SYST BP LT 130 MM HG: CPT | Mod: CPTII,S$GLB,, | Performed by: OBSTETRICS & GYNECOLOGY

## 2024-12-16 PROCEDURE — 3044F HG A1C LEVEL LT 7.0%: CPT | Mod: CPTII,S$GLB,, | Performed by: OBSTETRICS & GYNECOLOGY

## 2024-12-16 PROCEDURE — 99999 PR PBB SHADOW E&M-EST. PATIENT-LVL II: CPT | Mod: PBBFAC,,, | Performed by: OBSTETRICS & GYNECOLOGY

## 2024-12-16 NOTE — PROGRESS NOTES
Spotting 2 days ago, cramping afterward midline over bladder, now resolved. No h/o stones.       2024  29 y.o. 21w4d Estimated Date of Delivery: 25, dating reviewed.   OB History    Para Term  AB Living   1 0 0 0 0 0   SAB IAB Ectopic Multiple Live Births   0 0 0 0 0      # Outcome Date GA Lbr Luis Fernando/2nd Weight Sex Type Anes PTL Lv   1 Current                Prenatal labs reviewed and updated today    Review of Systems:  General ROS: negative for headache or visual changes  Breast ROS: negative for breast lumps  Gastrointestinal ROS: negative for constipation, diarrhea or nausea/vomiting  Musculoskeletal ROS: negative for pain in joints or swelling in face or hands.   Neurological ROS: negative for - headaches, numbness/tingling or visual changes      Physical Exam:  /86   Wt 105.8 kg (233 lb 4 oz)   BMI 41.32 kg/m²   Urine Dip: Pending  Fundal Height:22cm  Fetal Heart Tones: 158bpm  Constitutional: She is oriented to person, place, and time. She appears well-developed and well-nourished. No distress. Overweight   Cardiovascular: Normal rate.    Pulmonary/Chest: Effort normal. No respiratory distress  Abdominal: Soft, gravid, nontender. No rebound and no guarding.     Genitourinary: Deferred    Musculoskeletal: Normal range of motion, Minimal peripheral edema.   Neurological: She is alert and oriented to person, place, and time. Coordination normal.   Skin: Skin is warm and dry. She is not diaphoretic.  Psychiatric: She has a normal mood and affect.        Assessment:  29 y.o., at 21w4d Gestation   Patient Active Problem List   Diagnosis    Lactose intolerance    Obesity    R sphenoid nasal polyp    Anxiety    GERD (gastroesophageal reflux disease)    Hypertrophy, nasal, turbinate    Dysmenorrhea    Ankle instability, left    Adjustment disorder with anxious mood    COVID     Current Outpatient Medications on File Prior to Visit   Medication Sig Dispense Refill    doxylamine succinate  (UNISOM, DOXYLAMINE,) 25 mg tablet Take 25 mg by mouth nightly as needed for Insomnia. Taking 1/2 of 25mg      DULoxetine (CYMBALTA) 20 MG capsule TAKE 1 CAPSULE(20 MG) BY MOUTH EVERY DAY 90 capsule 3    melatonin (MELATIN) 3 mg tablet       prenatal no115/iron/folic acid (PRENATAL 19 ORAL) Take by mouth.      albuterol (VENTOLIN HFA) 90 mcg/actuation inhaler Inhale 2 puffs into the lungs every 4 (four) hours as needed for Wheezing. Rescue (Patient not taking: Reported on 9/18/2024) 18 g 3    diphenhydrAMINE (BENADRYL) 25 mg capsule Take 25 mg by mouth every 6 (six) hours as needed for Itching. (Patient not taking: Reported on 10/23/2024)      hydrOXYzine HCL (ATARAX) 25 MG tablet Take 1 tablet (25 mg total) by mouth nightly as needed for Itching. (Patient not taking: Reported on 12/16/2024) 30 tablet 6    ondansetron (ZOFRAN-ODT) 8 MG TbDL Take 1 tablet (8 mg total) by mouth every 12 (twelve) hours as needed (nausea). (Patient not taking: Reported on 12/11/2024) 20 tablet 6     No current facility-administered medications on file prior to visit.       Plan:   Labs today: none  Orders today: none  MEds today: none  Procedures Today: none  Follow up 1-2 days if recurrent problems  Reassured / watchful waiting

## 2024-12-18 LAB
BACTERIA UR CULT: NORMAL
BACTERIA UR CULT: NORMAL

## 2025-01-02 ENCOUNTER — PATIENT MESSAGE (OUTPATIENT)
Dept: OTHER | Facility: OTHER | Age: 30
End: 2025-01-02
Payer: COMMERCIAL

## 2025-01-03 ENCOUNTER — ROUTINE PRENATAL (OUTPATIENT)
Dept: OBSTETRICS AND GYNECOLOGY | Facility: CLINIC | Age: 30
End: 2025-01-03
Payer: COMMERCIAL

## 2025-01-03 VITALS
BODY MASS INDEX: 41.83 KG/M2 | SYSTOLIC BLOOD PRESSURE: 128 MMHG | WEIGHT: 236.13 LBS | DIASTOLIC BLOOD PRESSURE: 80 MMHG

## 2025-01-03 DIAGNOSIS — Z3A.24 24 WEEKS GESTATION OF PREGNANCY: Primary | ICD-10-CM

## 2025-01-03 LAB
BILIRUBIN, UA POC OHS: ABNORMAL
BLOOD, UA POC OHS: NEGATIVE
CLARITY, UA POC OHS: CLEAR
COLOR, UA POC OHS: ABNORMAL
GLUCOSE, UA POC OHS: NEGATIVE
KETONES, UA POC OHS: NEGATIVE
LEUKOCYTES, UA POC OHS: NEGATIVE
NITRITE, UA POC OHS: NEGATIVE
PH, UA POC OHS: 5.5
PROTEIN, UA POC OHS: 30
SPECIFIC GRAVITY, UA POC OHS: >=1.03
UROBILINOGEN, UA POC OHS: 1

## 2025-01-03 PROCEDURE — 99999 PR PBB SHADOW E&M-EST. PATIENT-LVL III: CPT | Mod: PBBFAC,,, | Performed by: OBSTETRICS & GYNECOLOGY

## 2025-01-03 NOTE — PROGRESS NOTES
The patient presents with No complaints.   Reports: Good fetal movements reported, No Bleeding or pains    1/3/2025  29 y.o. 24w1d Estimated Date of Delivery: 25, dating reviewed.   OB History    Para Term  AB Living   1 0 0 0 0 0   SAB IAB Ectopic Multiple Live Births   0 0 0 0 0      # Outcome Date GA Lbr Luis Frenando/2nd Weight Sex Type Anes PTL Lv   1 Current                Prenatal labs reviewed and updated today    Review of Systems:  General ROS: negative for headache or visual changes  Breast ROS: negative for breast lumps  Gastrointestinal ROS: negative for  constipation, diarrhea or nausea/vomiting  Musculoskeletal ROS: negative for pain in joints or swelling in face or hands.   Neurological ROS: negative for - headaches, numbness/tingling or visual changes      Physical Exam:  /80   Wt 107.1 kg (236 lb 1.8 oz)   BMI 41.83 kg/m²   Urine Dip: Pending  Fundal Height:24cm  Fetal Heart Tones: 160bpm  Constitutional: She is oriented to person, place, and time. She appears well-developed and well-nourished. No distress. Overweight   Cardiovascular: Normal rate.    Pulmonary/Chest: Effort normal. No respiratory distress  Abdominal: Soft, gravid, nontender. No rebound and no guarding.     Genitourinary: Deferred    Musculoskeletal: Normal range of motion, Minimal peripheral edema.   Neurological: She is alert and oriented to person, place, and time. Coordination normal.   Skin: Skin is warm and dry. She is not diaphoretic.  Psychiatric: She has a normal mood and affect.        Assessment:  29 y.o., at 24w1d Gestation   Patient Active Problem List   Diagnosis    Lactose intolerance    Obesity    R sphenoid nasal polyp    Anxiety    GERD (gastroesophageal reflux disease)    Hypertrophy, nasal, turbinate    Dysmenorrhea    Ankle instability, left    Adjustment disorder with anxious mood    COVID     Current Outpatient Medications on File Prior to Visit   Medication Sig Dispense Refill    doxylamine  succinate (UNISOM, DOXYLAMINE,) 25 mg tablet Take 25 mg by mouth nightly as needed for Insomnia. Taking 1/2 of 25mg      DULoxetine (CYMBALTA) 20 MG capsule TAKE 1 CAPSULE(20 MG) BY MOUTH EVERY DAY 90 capsule 3    melatonin (MELATIN) 3 mg tablet       prenatal no115/iron/folic acid (PRENATAL 19 ORAL) Take by mouth.      albuterol (VENTOLIN HFA) 90 mcg/actuation inhaler Inhale 2 puffs into the lungs every 4 (four) hours as needed for Wheezing. Rescue (Patient not taking: Reported on 1/3/2025) 18 g 3    diphenhydrAMINE (BENADRYL) 25 mg capsule Take 25 mg by mouth every 6 (six) hours as needed for Itching. (Patient not taking: Reported on 1/3/2025)      hydrOXYzine HCL (ATARAX) 25 MG tablet Take 1 tablet (25 mg total) by mouth nightly as needed for Itching. (Patient not taking: Reported on 9/18/2024) 30 tablet 6    ondansetron (ZOFRAN-ODT) 8 MG TbDL Take 1 tablet (8 mg total) by mouth every 12 (twelve) hours as needed (nausea). (Patient not taking: Reported on 1/3/2025) 20 tablet 6     No current facility-administered medications on file prior to visit.         Plan:   Labs today: none  Orders today: maria teresa;nicki 2 weeks  MEds today: none  Procedures Today: none  Follow up 2 Weeks, bleeding/pain precautions ,  kick counts, labor precautions

## 2025-01-04 ENCOUNTER — PATIENT MESSAGE (OUTPATIENT)
Dept: OBSTETRICS AND GYNECOLOGY | Facility: CLINIC | Age: 30
End: 2025-01-04
Payer: COMMERCIAL

## 2025-01-16 ENCOUNTER — PATIENT MESSAGE (OUTPATIENT)
Dept: OTHER | Facility: OTHER | Age: 30
End: 2025-01-16
Payer: COMMERCIAL

## 2025-01-20 ENCOUNTER — PATIENT MESSAGE (OUTPATIENT)
Dept: FAMILY MEDICINE | Facility: CLINIC | Age: 30
End: 2025-01-20
Payer: COMMERCIAL

## 2025-01-21 ENCOUNTER — PATIENT MESSAGE (OUTPATIENT)
Dept: OPTOMETRY | Facility: CLINIC | Age: 30
End: 2025-01-21
Payer: COMMERCIAL

## 2025-01-23 ENCOUNTER — PATIENT MESSAGE (OUTPATIENT)
Dept: OBSTETRICS AND GYNECOLOGY | Facility: CLINIC | Age: 30
End: 2025-01-23
Payer: COMMERCIAL

## 2025-01-29 ENCOUNTER — LAB VISIT (OUTPATIENT)
Dept: LAB | Facility: HOSPITAL | Age: 30
End: 2025-01-29
Attending: OBSTETRICS & GYNECOLOGY
Payer: COMMERCIAL

## 2025-01-29 ENCOUNTER — TELEPHONE (OUTPATIENT)
Dept: OBSTETRICS AND GYNECOLOGY | Facility: CLINIC | Age: 30
End: 2025-01-29

## 2025-01-29 ENCOUNTER — ROUTINE PRENATAL (OUTPATIENT)
Dept: OBSTETRICS AND GYNECOLOGY | Facility: CLINIC | Age: 30
End: 2025-01-29
Payer: COMMERCIAL

## 2025-01-29 VITALS
DIASTOLIC BLOOD PRESSURE: 78 MMHG | SYSTOLIC BLOOD PRESSURE: 122 MMHG | BODY MASS INDEX: 41.94 KG/M2 | WEIGHT: 236.75 LBS

## 2025-01-29 DIAGNOSIS — Z3A.24 24 WEEKS GESTATION OF PREGNANCY: ICD-10-CM

## 2025-01-29 DIAGNOSIS — Z3A.27 27 WEEKS GESTATION OF PREGNANCY: Primary | ICD-10-CM

## 2025-01-29 LAB
BILIRUBIN, UA POC OHS: NEGATIVE
BLOOD, UA POC OHS: NEGATIVE
CLARITY, UA POC OHS: CLEAR
COLOR, UA POC OHS: YELLOW
ERYTHROCYTE [DISTWIDTH] IN BLOOD BY AUTOMATED COUNT: 14.2 % (ref 11.5–14.5)
GLUCOSE SERPL-MCNC: 145 MG/DL (ref 70–140)
GLUCOSE, UA POC OHS: NEGATIVE
HCT VFR BLD AUTO: 31.7 % (ref 37–48.5)
HGB BLD-MCNC: 10.1 G/DL (ref 12–16)
KETONES, UA POC OHS: NEGATIVE
LEUKOCYTES, UA POC OHS: NEGATIVE
MCH RBC QN AUTO: 25.8 PG (ref 27–31)
MCHC RBC AUTO-ENTMCNC: 31.9 G/DL (ref 32–36)
MCV RBC AUTO: 81 FL (ref 82–98)
NITRITE, UA POC OHS: NEGATIVE
PH, UA POC OHS: 7
PLATELET # BLD AUTO: 386 K/UL (ref 150–450)
PMV BLD AUTO: 10.4 FL (ref 9.2–12.9)
PROTEIN, UA POC OHS: NEGATIVE
RBC # BLD AUTO: 3.92 M/UL (ref 4–5.4)
SPECIFIC GRAVITY, UA POC OHS: 1.02
TREPONEMA PALLIDUM IGG+IGM AB [PRESENCE] IN SERUM OR PLASMA BY IMMUNOASSAY: NONREACTIVE
UROBILINOGEN, UA POC OHS: 0.2
WBC # BLD AUTO: 12.19 K/UL (ref 3.9–12.7)

## 2025-01-29 PROCEDURE — 99999 PR PBB SHADOW E&M-EST. PATIENT-LVL III: CPT | Mod: PBBFAC,,, | Performed by: OBSTETRICS & GYNECOLOGY

## 2025-01-29 PROCEDURE — 82950 GLUCOSE TEST: CPT | Performed by: OBSTETRICS & GYNECOLOGY

## 2025-01-29 PROCEDURE — 85027 COMPLETE CBC AUTOMATED: CPT | Performed by: OBSTETRICS & GYNECOLOGY

## 2025-01-29 PROCEDURE — 86593 SYPHILIS TEST NON-TREP QUANT: CPT | Performed by: OBSTETRICS & GYNECOLOGY

## 2025-01-29 PROCEDURE — 36415 COLL VENOUS BLD VENIPUNCTURE: CPT | Mod: PN | Performed by: OBSTETRICS & GYNECOLOGY

## 2025-01-29 PROCEDURE — 0502F SUBSEQUENT PRENATAL CARE: CPT | Mod: CPTII,S$GLB,, | Performed by: OBSTETRICS & GYNECOLOGY

## 2025-01-29 NOTE — PROGRESS NOTES
The patient presents with routine OB complaints - double billed for OB u/s 20wks.   Reports: Good fetal movements reported, No Bleeding or pains    2025  29 y.o. 27w6d Estimated Date of Delivery: 25, dating reviewed.   OB History    Para Term  AB Living   1 0 0 0 0 0   SAB IAB Ectopic Multiple Live Births   0 0 0 0 0      # Outcome Date GA Lbr Luis Fernando/2nd Weight Sex Type Anes PTL Lv   1 Current                Prenatal labs reviewed and updated today    Review of Systems:  General ROS: negative for headache or visual changes  Breast ROS: negative for breast lumps  Gastrointestinal ROS: negative for  constipation, diarrhea or nausea/vomiting  Musculoskeletal ROS: negative for pain in joints or swelling in face or hands.   Neurological ROS: negative for - headaches, numbness/tingling or visual changes      Physical Exam:  /78   Wt 107.4 kg (236 lb 12.4 oz)   BMI 41.94 kg/m²   Urine Dip: Pending  Fundal Height:29cm  Fetal Heart Tones: 152bpm  Constitutional: She is oriented to person, place, and time. She appears well-developed and well-nourished. No distress. Overweight   Cardiovascular: Normal rate.    Pulmonary/Chest: Effort normal. No respiratory distress  Abdominal: Soft, gravid, nontender. No rebound and no guarding.     Genitourinary: Deferred    Musculoskeletal: Normal range of motion, Minimal peripheral edema.   Neurological: She is alert and oriented to person, place, and time. Coordination normal.   Skin: Skin is warm and dry. She is not diaphoretic.  Psychiatric: She has a normal mood and affect.        Assessment:  29 y.o., at 27w6d Gestation   Patient Active Problem List   Diagnosis    Lactose intolerance    Obesity    R sphenoid nasal polyp    Anxiety    GERD (gastroesophageal reflux disease)    Hypertrophy, nasal, turbinate    Dysmenorrhea    Ankle instability, left    Adjustment disorder with anxious mood    COVID     Current Outpatient Medications on File Prior to Visit    Medication Sig Dispense Refill    doxylamine succinate (UNISOM, DOXYLAMINE,) 25 mg tablet Take 25 mg by mouth nightly as needed for Insomnia. Taking 1/2 of 25mg      DULoxetine (CYMBALTA) 20 MG capsule TAKE 1 CAPSULE(20 MG) BY MOUTH EVERY DAY 90 capsule 3    melatonin (MELATIN) 3 mg tablet       prenatal no115/iron/folic acid (PRENATAL 19 ORAL) Take by mouth.      albuterol (VENTOLIN HFA) 90 mcg/actuation inhaler Inhale 2 puffs into the lungs every 4 (four) hours as needed for Wheezing. Rescue (Patient not taking: Reported on 1/3/2025) 18 g 3    diphenhydrAMINE (BENADRYL) 25 mg capsule Take 25 mg by mouth every 6 (six) hours as needed for Itching. (Patient not taking: Reported on 1/3/2025)      hydrOXYzine HCL (ATARAX) 25 MG tablet Take 1 tablet (25 mg total) by mouth nightly as needed for Itching. (Patient not taking: Reported on 9/18/2024) 30 tablet 6    ondansetron (ZOFRAN-ODT) 8 MG TbDL Take 1 tablet (8 mg total) by mouth every 12 (twelve) hours as needed (nausea). (Patient not taking: Reported on 12/11/2024) 20 tablet 6     No current facility-administered medications on file prior to visit.       Plan:   Labs today: glucola today  Orders today: none  MEds today: none  Procedures Today: none  Follow up 2 Weeks, bleeding/pain precautions ,  kick counts, labor precautions

## 2025-01-29 NOTE — TELEPHONE ENCOUNTER
----- Message from Raheem Calle MD sent at 1/29/2025  3:26 PM CST -----  Regarding: failed glucola  Please inform failed glucola  Need 3 hour GTT scheduled after fasting after midnight

## 2025-01-30 ENCOUNTER — PATIENT MESSAGE (OUTPATIENT)
Dept: OTHER | Facility: OTHER | Age: 30
End: 2025-01-30
Payer: COMMERCIAL

## 2025-01-31 ENCOUNTER — PATIENT MESSAGE (OUTPATIENT)
Dept: OBSTETRICS AND GYNECOLOGY | Facility: CLINIC | Age: 30
End: 2025-01-31
Payer: COMMERCIAL

## 2025-01-31 RX ORDER — FERROUS SULFATE 300 MG/5ML
300 LIQUID (ML) ORAL DAILY
Qty: 150 ML | Refills: 3 | Status: SHIPPED | OUTPATIENT
Start: 2025-01-31

## 2025-02-03 ENCOUNTER — LAB VISIT (OUTPATIENT)
Dept: LAB | Facility: HOSPITAL | Age: 30
End: 2025-02-03
Attending: OBSTETRICS & GYNECOLOGY
Payer: COMMERCIAL

## 2025-02-03 DIAGNOSIS — Z3A.27 27 WEEKS GESTATION OF PREGNANCY: ICD-10-CM

## 2025-02-03 PROCEDURE — 82951 GLUCOSE TOLERANCE TEST (GTT): CPT | Performed by: OBSTETRICS & GYNECOLOGY

## 2025-02-03 PROCEDURE — 36415 COLL VENOUS BLD VENIPUNCTURE: CPT | Mod: PN | Performed by: OBSTETRICS & GYNECOLOGY

## 2025-02-04 LAB
GLUCOSE SERPL-MCNC: 133 MG/DL
GLUCOSE SERPL-MCNC: 139 MG/DL
GLUCOSE SERPL-MCNC: 70 MG/DL (ref 70–110)
GLUCOSE SERPL-MCNC: 83 MG/DL

## 2025-02-12 ENCOUNTER — ROUTINE PRENATAL (OUTPATIENT)
Dept: OBSTETRICS AND GYNECOLOGY | Facility: CLINIC | Age: 30
End: 2025-02-12
Payer: COMMERCIAL

## 2025-02-12 VITALS
DIASTOLIC BLOOD PRESSURE: 82 MMHG | BODY MASS INDEX: 42.76 KG/M2 | SYSTOLIC BLOOD PRESSURE: 118 MMHG | WEIGHT: 241.38 LBS

## 2025-02-12 DIAGNOSIS — Z3A.29 29 WEEKS GESTATION OF PREGNANCY: Primary | ICD-10-CM

## 2025-02-12 LAB
BILIRUBIN, UA POC OHS: ABNORMAL
BLOOD, UA POC OHS: NEGATIVE
CLARITY, UA POC OHS: CLEAR
COLOR, UA POC OHS: YELLOW
GLUCOSE, UA POC OHS: NEGATIVE
KETONES, UA POC OHS: NEGATIVE
LEUKOCYTES, UA POC OHS: NEGATIVE
NITRITE, UA POC OHS: NEGATIVE
PH, UA POC OHS: 6
PROTEIN, UA POC OHS: 30
SPECIFIC GRAVITY, UA POC OHS: >=1.03
UROBILINOGEN, UA POC OHS: 1

## 2025-02-12 PROCEDURE — 99999 PR PBB SHADOW E&M-EST. PATIENT-LVL III: CPT | Mod: PBBFAC,,, | Performed by: OBSTETRICS & GYNECOLOGY

## 2025-02-12 PROCEDURE — 0502F SUBSEQUENT PRENATAL CARE: CPT | Mod: CPTII,S$GLB,, | Performed by: OBSTETRICS & GYNECOLOGY

## 2025-02-12 RX ORDER — LANOLIN ALCOHOL/MO/W.PET/CERES
1 CREAM (GRAM) TOPICAL
COMMUNITY

## 2025-02-12 RX ORDER — IBUPROFEN 100 MG/5ML
1000 SUSPENSION, ORAL (FINAL DOSE FORM) ORAL DAILY
COMMUNITY

## 2025-02-12 RX ORDER — POLYETHYLENE GLYCOL 3350 17 G/17G
17 POWDER, FOR SOLUTION ORAL DAILY
COMMUNITY

## 2025-02-12 NOTE — PROGRESS NOTES
The patient presents with routine OB complaints.   Reports: Good fetal movements reported, No Bleeding or pains    2025  29 y.o. 29w6d Estimated Date of Delivery: 25, dating reviewed.   OB History    Para Term  AB Living   1 0 0 0 0 0   SAB IAB Ectopic Multiple Live Births   0 0 0 0 0      # Outcome Date GA Lbr Luis Fernando/2nd Weight Sex Type Anes PTL Lv   1 Current                Prenatal labs reviewed and updated today    Review of Systems:  General ROS: negative for headache or visual changes  Breast ROS: negative for breast lumps  Gastrointestinal ROS: negative for constipation, diarrhea or nausea/vomiting  Musculoskeletal ROS: negative for pain in joints or swelling in face or hands.   Neurological ROS: negative for - headaches, numbness/tingling or visual changes      Physical Exam:  /82   Wt 109.5 kg (241 lb 6.5 oz)   BMI 42.76 kg/m²   Urine Dip: Pending  Fundal Height:30cm  Fetal Heart Tones: 140bpm  Constitutional: She is oriented to person, place, and time. She appears well-developed and well-nourished. No distress. Normal weight   Cardiovascular: Normal rate.    Pulmonary/Chest: Effort normal. No respiratory distress  Abdominal: Soft, gravid, nontender. No rebound and no guarding.     Genitourinary: Deferred    Musculoskeletal: Normal range of motion, Minimal peripheral edema.   Neurological: She is alert and oriented to person, place, and time. Coordination normal.   Skin: Skin is warm and dry. She is not diaphoretic.  Psychiatric: She has a normal mood and affect.        Assessment:  29 y.o., at 29w6d Gestation   Patient Active Problem List   Diagnosis    Lactose intolerance    Obesity    R sphenoid nasal polyp    Anxiety    GERD (gastroesophageal reflux disease)    Hypertrophy, nasal, turbinate    Dysmenorrhea    Ankle instability, left    Adjustment disorder with anxious mood    COVID     Current Outpatient Medications on File Prior to Visit   Medication Sig Dispense Refill     ascorbic acid, vitamin C, (VITAMIN C) 1000 MG tablet Take 1,000 mg by mouth once daily.      doxylamine succinate (UNISOM, DOXYLAMINE,) 25 mg tablet Take 25 mg by mouth nightly as needed for Insomnia. Taking 1/2 of 25mg      DULoxetine (CYMBALTA) 20 MG capsule TAKE 1 CAPSULE(20 MG) BY MOUTH EVERY DAY 90 capsule 3    ferrous sulfate (FEOSOL) Tab tablet Take 1 tablet by mouth daily with breakfast.      melatonin (MELATIN) 3 mg tablet       polyethylene glycol (GLYCOLAX) 17 gram/dose powder Take 17 g by mouth once daily. Every third day      prenatal no115/iron/folic acid (PRENATAL 19 ORAL) Take by mouth.      albuterol (VENTOLIN HFA) 90 mcg/actuation inhaler Inhale 2 puffs into the lungs every 4 (four) hours as needed for Wheezing. Rescue 18 g 3    diphenhydrAMINE (BENADRYL) 25 mg capsule Take 25 mg by mouth every 6 (six) hours as needed for Itching.      ferrous sulfate 300 mg (60 mg iron)/5 mL syrup Take 5 mLs (300 mg total) by mouth once daily. (Patient not taking: Reported on 2/12/2025) 150 mL 3    hydrOXYzine HCL (ATARAX) 25 MG tablet Take 1 tablet (25 mg total) by mouth nightly as needed for Itching. 30 tablet 6    ondansetron (ZOFRAN-ODT) 8 MG TbDL Take 1 tablet (8 mg total) by mouth every 12 (twelve) hours as needed (nausea). (Patient not taking: Reported on 2/12/2025) 20 tablet 6     No current facility-administered medications on file prior to visit.       Plan:   Labs today: none  Orders today: none  MEds today: none  Procedures Today: none  Follow up 2 Weeks, bleeding/pain precautions ,  kick counts, labor precautions

## 2025-02-13 ENCOUNTER — PATIENT MESSAGE (OUTPATIENT)
Dept: OTHER | Facility: OTHER | Age: 30
End: 2025-02-13
Payer: COMMERCIAL

## 2025-02-16 ENCOUNTER — PATIENT MESSAGE (OUTPATIENT)
Dept: OBSTETRICS AND GYNECOLOGY | Facility: CLINIC | Age: 30
End: 2025-02-16
Payer: COMMERCIAL

## 2025-02-19 ENCOUNTER — PATIENT MESSAGE (OUTPATIENT)
Dept: OBSTETRICS AND GYNECOLOGY | Facility: CLINIC | Age: 30
End: 2025-02-19
Payer: COMMERCIAL

## 2025-02-24 ENCOUNTER — PATIENT MESSAGE (OUTPATIENT)
Dept: OBSTETRICS AND GYNECOLOGY | Facility: CLINIC | Age: 30
End: 2025-02-24
Payer: COMMERCIAL

## 2025-02-26 ENCOUNTER — PATIENT MESSAGE (OUTPATIENT)
Dept: OBSTETRICS AND GYNECOLOGY | Facility: CLINIC | Age: 30
End: 2025-02-26
Payer: COMMERCIAL

## 2025-02-26 PROBLEM — D50.8 IRON DEFICIENCY ANEMIA SECONDARY TO INADEQUATE DIETARY IRON INTAKE: Status: ACTIVE | Noted: 2025-02-26

## 2025-02-26 PROBLEM — O16.3 ELEVATED BLOOD PRESSURE COMPLICATING PREGNANCY IN THIRD TRIMESTER, ANTEPARTUM: Status: ACTIVE | Noted: 2025-02-26

## 2025-02-26 PROBLEM — H66.90 OTITIS MEDIA: Status: ACTIVE | Noted: 2025-02-26

## 2025-02-26 PROBLEM — O14.13 SEVERE PREECLAMPSIA, THIRD TRIMESTER: Status: ACTIVE | Noted: 2025-02-26

## 2025-02-27 ENCOUNTER — PATIENT MESSAGE (OUTPATIENT)
Dept: OTHER | Facility: OTHER | Age: 30
End: 2025-02-27
Payer: COMMERCIAL

## 2025-02-27 ENCOUNTER — PATIENT MESSAGE (OUTPATIENT)
Dept: OBSTETRICS AND GYNECOLOGY | Facility: CLINIC | Age: 30
End: 2025-02-27
Payer: COMMERCIAL

## 2025-03-01 PROBLEM — O14.13 PREECLAMPSIA, SEVERE, THIRD TRIMESTER: Status: ACTIVE | Noted: 2025-03-01

## 2025-03-03 PROBLEM — O32.2XX0 TRANSVERSE LIE OF FETUS: Status: ACTIVE | Noted: 2025-03-03

## 2025-03-04 PROBLEM — D50.9 IRON DEFICIENCY ANEMIA: Status: ACTIVE | Noted: 2025-03-04

## 2025-03-10 ENCOUNTER — POSTPARTUM VISIT (OUTPATIENT)
Dept: OBSTETRICS AND GYNECOLOGY | Facility: CLINIC | Age: 30
End: 2025-03-10
Payer: COMMERCIAL

## 2025-03-10 VITALS
WEIGHT: 222.69 LBS | DIASTOLIC BLOOD PRESSURE: 74 MMHG | BODY MASS INDEX: 40.73 KG/M2 | SYSTOLIC BLOOD PRESSURE: 118 MMHG

## 2025-03-10 DIAGNOSIS — B35.4 TINEA CORPORIS: Primary | ICD-10-CM

## 2025-03-10 PROCEDURE — 0503F POSTPARTUM CARE VISIT: CPT | Mod: CPTII,S$GLB,, | Performed by: OBSTETRICS & GYNECOLOGY

## 2025-03-10 PROCEDURE — 99999 PR PBB SHADOW E&M-EST. PATIENT-LVL III: CPT | Mod: PBBFAC,,, | Performed by: OBSTETRICS & GYNECOLOGY

## 2025-03-10 RX ORDER — NYSTATIN 100000 [USP'U]/G
POWDER TOPICAL
Qty: 60 G | Refills: 1 | Status: SHIPPED | OUTPATIENT
Start: 2025-03-10 | End: 2026-03-10

## 2025-03-10 NOTE — PROGRESS NOTES
Patient here for 1 week postpartum exam without complaint. Breast/Bottle feeding.   She is s/p c/section, htn  She has no mood complaints.   Minimal lochia.   No pain.    PE:  /74 (Patient Position: Sitting)   Wt 101 kg (222 lb 10.6 oz)   Breastfeeding No   BMI 40.73 kg/m²   Constitutional: She is oriented to person, place, and time. She appears well-developed and well-nourished. No distress.   HENT:   Head: Normocephalic and atraumatic.   Eyes: Conjunctivae and EOM are normal. No scleral icterus.   Neck: Normal range of motion. Neck supple. No tracheal deviation present.   Cardiovascular: Normal rate.    Pulmonary/Chest: Effort normal. No respiratory distress. She exhibits no tenderness.  Breasts: def  Abdominal: Soft. She exhibits no distension and no mass. There is no tenderness. There is no rebound and no guarding. Dressing removed, looks fine  Genitourinary: deferred  Musculoskeletal: Normal range of motion.   Neurological: She is alert and oriented to person, place, and time. Coordination normal.   Skin: Skin is warm and dry. She is not diaphoretic.   Psychiatric: She has a normal mood and affect.      Assessment:  1 week interval PP exam, doing well      Plan:  Follow up1 week, bp and incision check  Increase activity,  hZang robles.

## 2025-03-12 ENCOUNTER — PATIENT MESSAGE (OUTPATIENT)
Dept: OBSTETRICS AND GYNECOLOGY | Facility: CLINIC | Age: 30
End: 2025-03-12
Payer: COMMERCIAL

## 2025-03-17 ENCOUNTER — POSTPARTUM VISIT (OUTPATIENT)
Dept: OBSTETRICS AND GYNECOLOGY | Facility: CLINIC | Age: 30
End: 2025-03-17
Payer: COMMERCIAL

## 2025-03-17 VITALS
BODY MASS INDEX: 40.48 KG/M2 | WEIGHT: 221.31 LBS | DIASTOLIC BLOOD PRESSURE: 74 MMHG | SYSTOLIC BLOOD PRESSURE: 106 MMHG

## 2025-03-17 PROCEDURE — 99999 PR PBB SHADOW E&M-EST. PATIENT-LVL III: CPT | Mod: PBBFAC,,, | Performed by: OBSTETRICS & GYNECOLOGY

## 2025-03-17 PROCEDURE — 0503F POSTPARTUM CARE VISIT: CPT | Mod: CPTII,S$GLB,, | Performed by: OBSTETRICS & GYNECOLOGY

## 2025-03-17 NOTE — PROGRESS NOTES
Patient here for 2 week postpartum exam without complaint. Breast feeding.   She is s/p c/sec- 33 wk  She has no mood complaints.   Minimal lochia.   No pain.    PE:  /74 (Patient Position: Sitting)   Wt 100.4 kg (221 lb 5.5 oz)   Breastfeeding No   BMI 40.48 kg/m²   Constitutional: She is oriented to person, place, and time. She appears well-developed and well-nourished. No distress.   HENT:   Head: Normocephalic and atraumatic.   Eyes: Conjunctivae and EOM are normal. No scleral icterus.   Neck: Normal range of motion. Neck supple. No tracheal deviation present.   Cardiovascular: Normal rate.    Pulmonary/Chest: Effort normal. No respiratory distress. She exhibits no tenderness.  Breasts: def  Abdominal: Soft. She exhibits no distension and no mass. There is no tenderness. There is no rebound and no guarding.  Genitourinary: deferred  Musculoskeletal: Normal range of motion.   Neurological: She is alert and oriented to person, place, and time. Coordination normal.   Skin: Skin is warm and dry. She is not diaphoretic.   Psychiatric: She has a normal mood and affect.      Assessment:  2 week interval PP exam, doing well  Htn improved on 300 labetalol bid    Plan:  Decrease labetalol to 100 bid  Follow up 4 weeks for pelvic and Birth control

## 2025-03-19 ENCOUNTER — PATIENT MESSAGE (OUTPATIENT)
Dept: OBSTETRICS AND GYNECOLOGY | Facility: CLINIC | Age: 30
End: 2025-03-19
Payer: COMMERCIAL

## 2025-03-22 ENCOUNTER — PATIENT MESSAGE (OUTPATIENT)
Dept: OBSTETRICS AND GYNECOLOGY | Facility: CLINIC | Age: 30
End: 2025-03-22
Payer: COMMERCIAL

## 2025-03-24 ENCOUNTER — POSTPARTUM VISIT (OUTPATIENT)
Dept: OBSTETRICS AND GYNECOLOGY | Facility: CLINIC | Age: 30
End: 2025-03-24
Payer: COMMERCIAL

## 2025-03-24 VITALS
SYSTOLIC BLOOD PRESSURE: 120 MMHG | WEIGHT: 223.75 LBS | BODY MASS INDEX: 40.93 KG/M2 | DIASTOLIC BLOOD PRESSURE: 80 MMHG

## 2025-03-24 PROCEDURE — 99999 PR PBB SHADOW E&M-EST. PATIENT-LVL III: CPT | Mod: PBBFAC,,, | Performed by: OBSTETRICS & GYNECOLOGY

## 2025-03-24 PROCEDURE — 0503F POSTPARTUM CARE VISIT: CPT | Mod: CPTII,S$GLB,, | Performed by: OBSTETRICS & GYNECOLOGY

## 2025-03-24 NOTE — PROGRESS NOTES
Patient here for 3 week postpartum exam without complaint. Breast feeding.   She is s/p c/sec - would leaking yesterday, clear fluid, resolved  She has no mood complaints.   Minimal lochia.   No pain.    PE:  /80 (Patient Position: Sitting)   Wt 101.5 kg (223 lb 12.3 oz)   Breastfeeding No   BMI 40.93 kg/m²   Constitutional: She is oriented to person, place, and time. She appears well-developed and well-nourished. No distress.   HENT:   Head: Normocephalic and atraumatic.   Eyes: Conjunctivae and EOM are normal. No scleral icterus.   Neck: Normal range of motion. Neck supple. No tracheal deviation present.   Cardiovascular: Normal rate.    Pulmonary/Chest: Effort normal. No respiratory distress. She exhibits no tenderness.  Breasts: def  Abdominal: Soft. She exhibits no distension and no mass. There is no tenderness. There is no rebound and no guarding. Incisoon healing well, clean and intact  Genitourinary: deferred  Musculoskeletal: Normal range of motion.   Neurological: She is alert and oriented to person, place, and time. Coordination normal.   Skin: Skin is warm and dry. She is not diaphoretic.   Psychiatric: She has a normal mood and affect.      Assessment:  3 week interval PP exam, doing well      Plan:  Routine pp care. D/c procardia  Follow up 3 weeks for pelvic and Birth control

## 2025-03-26 ENCOUNTER — PATIENT MESSAGE (OUTPATIENT)
Dept: OBSTETRICS AND GYNECOLOGY | Facility: CLINIC | Age: 30
End: 2025-03-26
Payer: COMMERCIAL

## 2025-03-27 ENCOUNTER — PATIENT MESSAGE (OUTPATIENT)
Dept: FAMILY MEDICINE | Facility: CLINIC | Age: 30
End: 2025-03-27
Payer: COMMERCIAL

## 2025-04-14 ENCOUNTER — POSTPARTUM VISIT (OUTPATIENT)
Dept: OBSTETRICS AND GYNECOLOGY | Facility: CLINIC | Age: 30
End: 2025-04-14
Payer: COMMERCIAL

## 2025-04-14 VITALS — DIASTOLIC BLOOD PRESSURE: 86 MMHG | SYSTOLIC BLOOD PRESSURE: 132 MMHG | BODY MASS INDEX: 39.8 KG/M2 | WEIGHT: 217.63 LBS

## 2025-04-14 DIAGNOSIS — Z12.4 SCREENING FOR CERVICAL CANCER: Primary | ICD-10-CM

## 2025-04-14 PROCEDURE — 0503F POSTPARTUM CARE VISIT: CPT | Mod: CPTII,S$GLB,, | Performed by: OBSTETRICS & GYNECOLOGY

## 2025-04-14 PROCEDURE — 99999 PR PBB SHADOW E&M-EST. PATIENT-LVL III: CPT | Mod: PBBFAC,,, | Performed by: OBSTETRICS & GYNECOLOGY

## 2025-04-14 NOTE — PROGRESS NOTES
History of Present Illness:   Pateint presents today  6 weeks postPartum, status post c/section- preE, with complaint of none.      Past medical and surgical history reviewed.   I have reviewed the patient's medical history in detail and updated the computerized patient record.    Physical exam:  Vital Signs: as above, reviewed.  Constitutional: She is oriented to person, place, and time. She appears well-developed and well-nourished. No distress.   HENT:   Head: Normocephalic and atraumatic.   Eyes: Conjunctivae and EOM are normal. No scleral icterus.   Neck: Normal range of motion. Neck supple. No tracheal deviation present.   Cardiovascular: Normal rate.    Pulmonary/Chest: Effort normal. No respiratory distress. She exhibits no tenderness.  Breasts: deferred, breast feeding  Abdominal: Soft. She exhibits no distension and no mass. The incision is healed well. There is no rebound and no guarding.   Genitourinary:    External rectal exam shows no thrombosed external hemorrhoids.    Pelvic exam was performed with patient supine.   No labial fusion.   There is no rash, lesion or injury on the right labia.   There is no rash, lesion or injury on the left labia.   No bleeding and no signs of injury around the vaginal introitus, healed well, urethra is without lesions and well supported. The cervix is visualized with no discharge, lesions or friability.   No vaginal discharge found.    No significant Cystocele, Enterocele or rectocele, and uterus well supported.   Bimanual exam:   The urethra is normal to palpation and there are no palpable vaginal wall masses.   Uterus is not deviated, not enlarged, not fixed, normal shape and not tender.   Cervix exhibits no motion tenderness.    Right adnexum displays no mass and no tenderness.   Left adnexum displays no mass and no tenderness.  Musculoskeletal: Normal range of motion.   Lymphadenopathy: No inguinal adenopathy present.   Neurological: She is alert and oriented to  person, place, and time. Coordination normal.   Skin: Skin is warm and dry. She is not diaphoretic.   Psychiatric: She has a normal mood and affect.    Assessment:  Normal pp exam    Plan:  Follow up  1 year  PAP done  Primary care for BP monitoring.   Resume normal activity.  year

## 2025-04-20 ENCOUNTER — PATIENT MESSAGE (OUTPATIENT)
Dept: OBSTETRICS AND GYNECOLOGY | Facility: CLINIC | Age: 30
End: 2025-04-20
Payer: COMMERCIAL

## 2025-04-23 ENCOUNTER — PATIENT MESSAGE (OUTPATIENT)
Dept: FAMILY MEDICINE | Facility: CLINIC | Age: 30
End: 2025-04-23
Payer: COMMERCIAL

## 2025-04-24 ENCOUNTER — PATIENT MESSAGE (OUTPATIENT)
Dept: OBSTETRICS AND GYNECOLOGY | Facility: CLINIC | Age: 30
End: 2025-04-24
Payer: COMMERCIAL

## 2025-04-25 ENCOUNTER — PATIENT MESSAGE (OUTPATIENT)
Dept: OBSTETRICS AND GYNECOLOGY | Facility: CLINIC | Age: 30
End: 2025-04-25
Payer: COMMERCIAL

## 2025-04-30 ENCOUNTER — OFFICE VISIT (OUTPATIENT)
Dept: OPTOMETRY | Facility: CLINIC | Age: 30
End: 2025-04-30
Payer: COMMERCIAL

## 2025-04-30 ENCOUNTER — PATIENT MESSAGE (OUTPATIENT)
Dept: FAMILY MEDICINE | Facility: CLINIC | Age: 30
End: 2025-04-30
Payer: COMMERCIAL

## 2025-04-30 ENCOUNTER — PATIENT MESSAGE (OUTPATIENT)
Dept: OBSTETRICS AND GYNECOLOGY | Facility: CLINIC | Age: 30
End: 2025-04-30
Payer: COMMERCIAL

## 2025-04-30 DIAGNOSIS — Z13.5 GLAUCOMA SCREENING: ICD-10-CM

## 2025-04-30 DIAGNOSIS — G43.101 MIGRAINE WITH AURA AND WITH STATUS MIGRAINOSUS, NOT INTRACTABLE: ICD-10-CM

## 2025-04-30 DIAGNOSIS — H52.203 HYPEROPIA OF BOTH EYES WITH ASTIGMATISM: ICD-10-CM

## 2025-04-30 DIAGNOSIS — H52.03 HYPEROPIA OF BOTH EYES WITH ASTIGMATISM: ICD-10-CM

## 2025-04-30 DIAGNOSIS — H43.393 VITREOUS FLOATERS, BILATERAL: ICD-10-CM

## 2025-04-30 DIAGNOSIS — Z01.00 EXAMINATION OF EYES AND VISION: Primary | ICD-10-CM

## 2025-04-30 PROCEDURE — 92014 COMPRE OPH EXAM EST PT 1/>: CPT | Mod: S$GLB,,, | Performed by: OPTOMETRIST

## 2025-04-30 PROCEDURE — 92015 DETERMINE REFRACTIVE STATE: CPT | Mod: S$GLB,,, | Performed by: OPTOMETRIST

## 2025-04-30 PROCEDURE — 99999 PR PBB SHADOW E&M-EST. PATIENT-LVL III: CPT | Mod: PBBFAC,,, | Performed by: OPTOMETRIST

## 2025-04-30 PROCEDURE — 1159F MED LIST DOCD IN RCRD: CPT | Mod: CPTII,S$GLB,, | Performed by: OPTOMETRIST

## 2025-04-30 PROCEDURE — 92133 CPTRZD OPH DX IMG PST SGM ON: CPT | Mod: S$GLB,,, | Performed by: OPTOMETRIST

## 2025-04-30 RX ORDER — DULOXETIN HYDROCHLORIDE 20 MG/1
20 CAPSULE, DELAYED RELEASE ORAL
Qty: 90 CAPSULE | Refills: 3 | OUTPATIENT
Start: 2025-04-30

## 2025-04-30 NOTE — PROGRESS NOTES
HPI    Pt presents today for a dfe.  Pt c/o decrease in VA since getting pregnant.  Pt wears gls.  Pt reports HA's have gotten worse. (Chronic)  Does not instill gtts.  Denies ocular pain/disc.  Pt c/o mpre floaters OS; denies flashes    Agree above   Does report definite visual aura preceding HA pain since last exam   Currently off HA meds due to breast feeding     Last edited by LETICIA Londono, OD on 4/30/2025 12:09 PM.            Assessment /Plan     For exam results, see Encounter Report.    Examination of eyes and vision    Migraine with aura and with status migrainosus, not intractable  -     Posterior Segment OCT Optic Nerve- Both eyes    Vitreous floaters, bilateral    Glaucoma screening    Hyperopia of both eyes with astigmatism      Ocular health exam OU   Longstanding migraine history --recent changes w/ classic aura OU   Currently off meds following child birth   OCT rnfl / onh 4/2025     G 102   G 99 wnl    Slightly tilted / elevated nasally disc OU     3.   RD precautions given and reviewed. Patient knows to call/ message if any further changes in symptoms occur.  4.   Not suspect   5.   Low latent hyperopia on CRx OD>OS, --gave copy --try for all tasks, [place] near and night driving     Discussed and educated patient on current findings /plan.  RTC 1 year, prn if any changes / issues

## 2025-04-30 NOTE — TELEPHONE ENCOUNTER
Pt would like refill on Cymbalta.  Pt just had a baby and has scheduled for June and is out of medication.  Please rx to last till appointment in June.

## 2025-04-30 NOTE — TELEPHONE ENCOUNTER
No care due was identified.  [facility] Embedded Care Due Messages. Reference number: [id number].   4/30/2025 1:20:53 PM CDT

## 2025-05-01 ENCOUNTER — OFFICE VISIT (OUTPATIENT)
Dept: FAMILY MEDICINE | Facility: CLINIC | Age: 30
End: 2025-05-01
Payer: COMMERCIAL

## 2025-05-01 VITALS
HEART RATE: 103 BPM | BODY MASS INDEX: 40.94 KG/M2 | HEIGHT: 62 IN | SYSTOLIC BLOOD PRESSURE: 118 MMHG | WEIGHT: 222.5 LBS | DIASTOLIC BLOOD PRESSURE: 70 MMHG | OXYGEN SATURATION: 98 %

## 2025-05-01 DIAGNOSIS — O16.3 ELEVATED BLOOD PRESSURE COMPLICATING PREGNANCY IN THIRD TRIMESTER, ANTEPARTUM: ICD-10-CM

## 2025-05-01 DIAGNOSIS — F41.9 ANXIETY: Primary | ICD-10-CM

## 2025-05-01 PROCEDURE — 99214 OFFICE O/P EST MOD 30 MIN: CPT | Mod: S$GLB,,, | Performed by: FAMILY MEDICINE

## 2025-05-01 PROCEDURE — 3008F BODY MASS INDEX DOCD: CPT | Mod: S$GLB,,, | Performed by: FAMILY MEDICINE

## 2025-05-01 PROCEDURE — 3074F SYST BP LT 130 MM HG: CPT | Mod: S$GLB,,, | Performed by: FAMILY MEDICINE

## 2025-05-01 PROCEDURE — 1159F MED LIST DOCD IN RCRD: CPT | Mod: S$GLB,,, | Performed by: FAMILY MEDICINE

## 2025-05-01 PROCEDURE — 3078F DIAST BP <80 MM HG: CPT | Mod: S$GLB,,, | Performed by: FAMILY MEDICINE

## 2025-05-01 PROCEDURE — G2211 COMPLEX E/M VISIT ADD ON: HCPCS | Mod: S$GLB,,, | Performed by: FAMILY MEDICINE

## 2025-05-01 RX ORDER — DULOXETIN HYDROCHLORIDE 20 MG/1
20 CAPSULE, DELAYED RELEASE ORAL DAILY
Qty: 90 CAPSULE | Refills: 3 | Status: SHIPPED | OUTPATIENT
Start: 2025-05-01

## 2025-05-01 NOTE — PROGRESS NOTES
"    [facility] - [place], [place]    Family Medicine Office Visit    Chief Complaint   Patient presents with    Medication Refill        HPI:  Routine exam for medication refill  Has since had baby, but had to deal with pre-eclampsia - delivered via C section .  Baby stayed in NICU x 30 days.  Now home with baby.    Anxiety stable    Worried slightly about milk supply/pumping    ROS: as above    Vitals:    25 0817   BP: 118/70   BP Location: Left arm   Patient Position: Sitting   Pulse: 103   SpO2: 98%   Weight: 100.9 kg (222 lb 8 oz)   Height: 5' 2" (1.575 m)      Body mass index is 40.7 kg/m².      General:  AOx3, well nourished and developed in no acute distress  Eyes:  PERRLA, EOMI, vision intact grossly  ENT:  normal hearing, moist oral mucosa  Neck:  trachea midline with no masses or thyromegaly  Heart:  RRR, no murmurs.  No edema noted, extremities warm and well perfused  Lungs:  clear to auscultation bilaterally with symmetric chest movement  Abdomen:  Soft, nontender, nondistended.  Normal bowel sounds  Musculoskeletal:  Normal gait.  Normal posture.  Normal muscular development with no joint swelling.  Neurological:  CN II-XII grossly intact. Symmetric strength and sensation  Psych:  Normal mood and affect.  Able to demonstrate good judgement and personal insight.      Assessment/Plan:    1. Anxiety    2. Elevated blood pressure complicating pregnancy in third trimester, antepartum    3.  delivery delivered    Other orders  -     DULoxetine (CYMBALTA) 20 MG capsule; Take 1 capsule (20 mg total) by mouth once daily.  Dispense: 90 capsule; Refill: 3       Stable, refilled medication and advised can increase dose if needed  Reviewed hospital course  Recovered well.  Advised on expected milk production, and reasonable addition of supplemented formula    Visit today included increased complexity associated with the care of the episodic problem anxiety, " preeclampsia addressed and managing the longitudinal care of the patient due to the serious and/or complex managed problem(s) anxiety, preeclampsia.

## 2025-05-21 ENCOUNTER — PATIENT MESSAGE (OUTPATIENT)
Dept: OPTOMETRY | Facility: CLINIC | Age: 30
End: 2025-05-21
Payer: COMMERCIAL

## 2025-05-23 ENCOUNTER — PATIENT MESSAGE (OUTPATIENT)
Dept: FAMILY MEDICINE | Facility: CLINIC | Age: 30
End: 2025-05-23
Payer: COMMERCIAL

## 2025-05-29 ENCOUNTER — PATIENT MESSAGE (OUTPATIENT)
Dept: FAMILY MEDICINE | Facility: CLINIC | Age: 30
End: 2025-05-29
Payer: COMMERCIAL

## 2025-05-29 RX ORDER — DULOXETINE 40 MG/1
40 CAPSULE, DELAYED RELEASE ORAL DAILY
Qty: 30 CAPSULE | Refills: 11 | Status: SHIPPED | OUTPATIENT
Start: 2025-05-29 | End: 2026-05-29

## 2025-06-03 RX ORDER — DULOXETIN HYDROCHLORIDE 20 MG/1
40 CAPSULE, DELAYED RELEASE ORAL DAILY
Qty: 180 CAPSULE | Refills: 3 | Status: SHIPPED | OUTPATIENT
Start: 2025-06-03 | End: 2026-06-03

## 2025-06-04 ENCOUNTER — OFFICE VISIT (OUTPATIENT)
Dept: OPTOMETRY | Facility: CLINIC | Age: 30
End: 2025-06-04
Payer: COMMERCIAL

## 2025-06-04 DIAGNOSIS — H52.203 HYPEROPIA OF BOTH EYES WITH ASTIGMATISM: Primary | ICD-10-CM

## 2025-06-04 DIAGNOSIS — H52.03 HYPEROPIA OF BOTH EYES WITH ASTIGMATISM: Primary | ICD-10-CM

## 2025-06-04 PROCEDURE — 99999 PR PBB SHADOW E&M-EST. PATIENT-LVL III: CPT | Mod: PBBFAC,,, | Performed by: OPTOMETRIST

## 2025-06-04 PROCEDURE — 99499 UNLISTED E&M SERVICE: CPT | Mod: S$GLB,,, | Performed by: OPTOMETRIST

## 2025-08-27 ENCOUNTER — PATIENT MESSAGE (OUTPATIENT)
Dept: FAMILY MEDICINE | Facility: CLINIC | Age: 30
End: 2025-08-27
Payer: COMMERCIAL

## 2025-08-28 ENCOUNTER — PATIENT MESSAGE (OUTPATIENT)
Dept: FAMILY MEDICINE | Facility: CLINIC | Age: 30
End: 2025-08-28
Payer: COMMERCIAL

## (undated) DEVICE — TROCAR ENDOPATH XCEL 11MM 10CM

## (undated) DEVICE — SPLINT PLASTER FS 5IN X 30IN

## (undated) DEVICE — SPLINT PLASTER FAST SET 5X30IN

## (undated) DEVICE — SEE MEDLINE ITEM 157117

## (undated) DEVICE — SUT MONOCRYL 2-0 S UND

## (undated) DEVICE — NDL INSUF ULTRA VERESS 120MM

## (undated) DEVICE — BLADE SURG CARBON STEEL SZ11

## (undated) DEVICE — SOL IRR STRL WATER 500ML

## (undated) DEVICE — BAG TISS RETRV MONARCH 10MM

## (undated) DEVICE — DRESSING XEROFORM 1X8IN

## (undated) DEVICE — SEE MEDLINE ITEM 157131

## (undated) DEVICE — SUT MONOCYRL 4-0 PS2 UND

## (undated) DEVICE — SUT 0 VICRYL / UR6 (J603)

## (undated) DEVICE — ELECTRODE REM PLYHSV RETURN 9

## (undated) DEVICE — BLADE GATOR 2.0

## (undated) DEVICE — Device

## (undated) DEVICE — UNDERGLOVE BIOGEL PI SZ 6.5 LF

## (undated) DEVICE — CHLORAPREP W TINT 26ML APPL

## (undated) DEVICE — TROCAR ENDOPATH XCEL 5MM 7.5CM

## (undated) DEVICE — SYR 10CC LUER LOCK

## (undated) DEVICE — SCISSOR 5MMX35CM DIRECT DRIVE

## (undated) DEVICE — SUT MONOCRYL 3-0 SH U/D

## (undated) DEVICE — PAD CAST SPECIALIST STRL 6

## (undated) DEVICE — DRAPE ABDOMINAL TIBURON 14X11

## (undated) DEVICE — TUBING PNEUMO

## (undated) DEVICE — NDL SPINAL 18GX3.5 SPINOCAN

## (undated) DEVICE — SEE MEDLINE ITEM 152622

## (undated) DEVICE — SET IRR URLGY 2LINE UNIV SPIKE

## (undated) DEVICE — SEE MEDLINE ITEM 157128

## (undated) DEVICE — NDL 22GA X1 1/2 REG BEVEL

## (undated) DEVICE — TROCAR ENDOPATH XCEL 5X75MM

## (undated) DEVICE — GLOVE SURG BIOGEL LATEX SZ 7.5

## (undated) DEVICE — PAD CAST SPECIALIST STRL 4

## (undated) DEVICE — SEE L#120831

## (undated) DEVICE — SOL IRR NACL .9% 3000ML

## (undated) DEVICE — APPLICATOR CHLORAPREP ORN 26ML

## (undated) DEVICE — GAUZE SPONGE 4X4 12PLY

## (undated) DEVICE — SUT ETHILON 3-0 PS2 18 BLK

## (undated) DEVICE — SUT ULTRATAPE BLUE 2MM

## (undated) DEVICE — SEE MEDLINE ITEM 146298

## (undated) DEVICE — SEE MEDLINE ITEM 146313

## (undated) DEVICE — DURAPREP SURG SCRUB 26ML

## (undated) DEVICE — GAUZE SPONGE 8X4 12 PLY

## (undated) DEVICE — ALCOHOL 70% ISOP RUBBING 4OZ

## (undated) DEVICE — DRESSING XEROFORM FOIL PK 1X8

## (undated) DEVICE — CLIP AUTO APPLIER ENDO

## (undated) DEVICE — DRAPE PLASTIC U 60X72

## (undated) DEVICE — KIT ANTIFOG

## (undated) DEVICE — SEE MEDLINE ITEM 146308

## (undated) DEVICE — BANDAGE ESMARK 6X12

## (undated) DEVICE — PAD ABD 8X10 STERILE

## (undated) DEVICE — STRAP ANKLE ARTHSCP DSRCTOR

## (undated) DEVICE — NEPTUNE 4 PORT MANIFOLD

## (undated) DEVICE — DRAPE STERI U-SHAPED 47X51IN